# Patient Record
Sex: FEMALE | Race: WHITE | Employment: OTHER | URBAN - METROPOLITAN AREA
[De-identification: names, ages, dates, MRNs, and addresses within clinical notes are randomized per-mention and may not be internally consistent; named-entity substitution may affect disease eponyms.]

---

## 2017-01-18 RX ORDER — LEVOTHYROXINE SODIUM 175 UG/1
TABLET ORAL
Qty: 90 TABLET | Refills: 0 | Status: SHIPPED | OUTPATIENT
Start: 2017-01-18 | End: 2017-03-17 | Stop reason: SDUPTHER

## 2017-01-26 ENCOUNTER — OFFICE VISIT (OUTPATIENT)
Dept: INTERNAL MEDICINE CLINIC | Age: 72
End: 2017-01-26

## 2017-01-26 VITALS
BODY MASS INDEX: 46.15 KG/M2 | WEIGHT: 277 LBS | HEART RATE: 68 BPM | DIASTOLIC BLOOD PRESSURE: 90 MMHG | HEIGHT: 65 IN | SYSTOLIC BLOOD PRESSURE: 140 MMHG

## 2017-01-26 DIAGNOSIS — Z11.59 NEED FOR HEPATITIS C SCREENING TEST: ICD-10-CM

## 2017-01-26 DIAGNOSIS — E11.9 TYPE 2 DIABETES MELLITUS WITHOUT COMPLICATION, WITHOUT LONG-TERM CURRENT USE OF INSULIN (HCC): Primary | ICD-10-CM

## 2017-01-26 DIAGNOSIS — E03.9 ACQUIRED HYPOTHYROIDISM: ICD-10-CM

## 2017-01-26 DIAGNOSIS — E66.01 MORBID OBESITY DUE TO EXCESS CALORIES (HCC): ICD-10-CM

## 2017-01-26 DIAGNOSIS — I10 ESSENTIAL HYPERTENSION: ICD-10-CM

## 2017-01-26 DIAGNOSIS — E55.9 VITAMIN D DEFICIENCY: ICD-10-CM

## 2017-01-26 DIAGNOSIS — Z23 NEED FOR TDAP VACCINATION: ICD-10-CM

## 2017-01-26 DIAGNOSIS — E78.2 MIXED HYPERLIPIDEMIA: ICD-10-CM

## 2017-01-26 DIAGNOSIS — Z76.89 ENCOUNTER TO ESTABLISH CARE: ICD-10-CM

## 2017-01-26 PROCEDURE — 99215 OFFICE O/P EST HI 40 MIN: CPT | Performed by: FAMILY MEDICINE

## 2017-01-26 RX ORDER — LISINOPRIL AND HYDROCHLOROTHIAZIDE 20; 12.5 MG/1; MG/1
TABLET ORAL
Qty: 180 TABLET | Refills: 3 | Status: SHIPPED | OUTPATIENT
Start: 2017-01-26 | End: 2018-02-26 | Stop reason: SDUPTHER

## 2017-01-26 RX ORDER — SIMVASTATIN 20 MG
20 TABLET ORAL EVERY EVENING
Qty: 90 TABLET | Refills: 3 | Status: SHIPPED | OUTPATIENT
Start: 2017-01-26 | End: 2018-02-26 | Stop reason: SDUPTHER

## 2017-01-26 RX ORDER — GLIPIZIDE 5 MG/1
10 TABLET, FILM COATED, EXTENDED RELEASE ORAL 2 TIMES DAILY
Qty: 360 TABLET | Refills: 3 | Status: SHIPPED | OUTPATIENT
Start: 2017-01-26 | End: 2018-02-26 | Stop reason: SDUPTHER

## 2017-01-26 ASSESSMENT — PATIENT HEALTH QUESTIONNAIRE - PHQ9
1. LITTLE INTEREST OR PLEASURE IN DOING THINGS: 0
SUM OF ALL RESPONSES TO PHQ QUESTIONS 1-9: 0
2. FEELING DOWN, DEPRESSED OR HOPELESS: 0
SUM OF ALL RESPONSES TO PHQ9 QUESTIONS 1 & 2: 0

## 2017-01-29 ASSESSMENT — ENCOUNTER SYMPTOMS
VOMITING: 0
SORE THROAT: 0
TROUBLE SWALLOWING: 0
ABDOMINAL PAIN: 0
NAUSEA: 0
DIARRHEA: 0
BACK PAIN: 0
WHEEZING: 0
RHINORRHEA: 0
CHOKING: 0
COUGH: 0
SHORTNESS OF BREATH: 0
CONSTIPATION: 0

## 2017-02-07 ENCOUNTER — PATIENT MESSAGE (OUTPATIENT)
Dept: INTERNAL MEDICINE CLINIC | Age: 72
End: 2017-02-07

## 2017-02-13 ENCOUNTER — HOSPITAL ENCOUNTER (OUTPATIENT)
Dept: GENERAL RADIOLOGY | Age: 72
Discharge: HOME | End: 2017-02-13
Attending: FAMILY MEDICINE | Admitting: FAMILY MEDICINE

## 2017-02-13 ENCOUNTER — TELEPHONE (OUTPATIENT)
Dept: INTERNAL MEDICINE CLINIC | Age: 72
End: 2017-02-13

## 2017-02-13 DIAGNOSIS — E78.2 MIXED HYPERLIPIDEMIA: ICD-10-CM

## 2017-02-13 DIAGNOSIS — Z11.59 NEED FOR HEPATITIS C SCREENING TEST: ICD-10-CM

## 2017-02-13 DIAGNOSIS — E03.9 ACQUIRED HYPOTHYROIDISM: ICD-10-CM

## 2017-02-13 DIAGNOSIS — E11.9 TYPE 2 DIABETES MELLITUS WITHOUT COMPLICATION, WITHOUT LONG-TERM CURRENT USE OF INSULIN (HCC): ICD-10-CM

## 2017-02-13 DIAGNOSIS — F32.A DEPRESSION, UNSPECIFIED DEPRESSION TYPE: ICD-10-CM

## 2017-02-13 DIAGNOSIS — E55.9 VITAMIN D DEFICIENCY: ICD-10-CM

## 2017-02-13 LAB
A/G RATIO: 1.2 (ref 1.1–2.2)
ALBUMIN SERPL-MCNC: 3.8 G/DL (ref 3.4–5)
ALP BLD-CCNC: 96 U/L (ref 40–129)
ALT SERPL-CCNC: 18 U/L (ref 10–40)
ANION GAP SERPL CALCULATED.3IONS-SCNC: 15 MMOL/L (ref 3–16)
AST SERPL-CCNC: 19 U/L (ref 15–37)
BILIRUB SERPL-MCNC: 0.5 MG/DL (ref 0–1)
BUN BLDV-MCNC: 15 MG/DL (ref 7–20)
CALCIUM SERPL-MCNC: 9.4 MG/DL (ref 8.3–10.6)
CHLORIDE BLD-SCNC: 96 MMOL/L (ref 99–110)
CHOLESTEROL, TOTAL: 167 MG/DL (ref 0–199)
CO2: 28 MMOL/L (ref 21–32)
CREAT SERPL-MCNC: <0.5 MG/DL (ref 0.6–1.2)
CREATININE URINE: 123.8 MG/DL (ref 28–259)
GFR AFRICAN AMERICAN: >60
GFR NON-AFRICAN AMERICAN: >60
GLOBULIN: 3.2 G/DL
GLUCOSE BLD-MCNC: 252 MG/DL (ref 70–99)
HDLC SERPL-MCNC: 61 MG/DL (ref 40–60)
HEPATITIS C ANTIBODY INTERPRETATION: NORMAL
LDL CHOLESTEROL CALCULATED: 75 MG/DL
MICROALBUMIN UR-MCNC: 1.5 MG/DL
MICROALBUMIN/CREAT UR-RTO: 12.1 MG/G (ref 0–30)
POTASSIUM SERPL-SCNC: 4.6 MMOL/L (ref 3.5–5.1)
SODIUM BLD-SCNC: 139 MMOL/L (ref 136–145)
TOTAL PROTEIN: 7 G/DL (ref 6.4–8.2)
TRIGL SERPL-MCNC: 157 MG/DL (ref 0–150)
TSH SERPL DL<=0.05 MIU/L-ACNC: 2.65 UIU/ML (ref 0.27–4.2)
VITAMIN D 25-HYDROXY: 22.8 NG/ML
VLDLC SERPL CALC-MCNC: 31 MG/DL

## 2017-02-13 RX ORDER — FLUOXETINE HYDROCHLORIDE 40 MG/1
CAPSULE ORAL
Qty: 90 CAPSULE | Refills: 1 | Status: SHIPPED | OUTPATIENT
Start: 2017-02-13 | End: 2018-02-26 | Stop reason: SDUPTHER

## 2017-02-14 LAB
ESTIMATED AVERAGE GLUCOSE: 231.7 MG/DL
HBA1C MFR BLD: 9.7 %

## 2017-02-17 ENCOUNTER — TELEPHONE (OUTPATIENT)
Dept: INTERNAL MEDICINE CLINIC | Age: 72
End: 2017-02-17

## 2017-02-27 ENCOUNTER — OFFICE VISIT (OUTPATIENT)
Dept: INTERNAL MEDICINE CLINIC | Age: 72
End: 2017-02-27

## 2017-02-27 ENCOUNTER — OFFICE VISIT (OUTPATIENT)
Dept: ORTHOPEDIC SURGERY | Age: 72
End: 2017-02-27

## 2017-02-27 VITALS
DIASTOLIC BLOOD PRESSURE: 79 MMHG | HEIGHT: 65 IN | HEART RATE: 64 BPM | BODY MASS INDEX: 45.32 KG/M2 | SYSTOLIC BLOOD PRESSURE: 158 MMHG | WEIGHT: 272 LBS

## 2017-02-27 VITALS
TEMPERATURE: 97.9 F | WEIGHT: 272 LBS | DIASTOLIC BLOOD PRESSURE: 64 MMHG | SYSTOLIC BLOOD PRESSURE: 128 MMHG | BODY MASS INDEX: 45.26 KG/M2

## 2017-02-27 DIAGNOSIS — E66.01 MORBID (SEVERE) OBESITY DUE TO EXCESS CALORIES (HCC): ICD-10-CM

## 2017-02-27 DIAGNOSIS — M17.11 PRIMARY OSTEOARTHRITIS OF RIGHT KNEE: Primary | ICD-10-CM

## 2017-02-27 DIAGNOSIS — E11.9 TYPE 2 DIABETES MELLITUS WITHOUT COMPLICATION, WITHOUT LONG-TERM CURRENT USE OF INSULIN (HCC): Primary | ICD-10-CM

## 2017-02-27 DIAGNOSIS — M25.561 RIGHT KNEE PAIN, UNSPECIFIED CHRONICITY: ICD-10-CM

## 2017-02-27 PROCEDURE — 99203 OFFICE O/P NEW LOW 30 MIN: CPT | Performed by: ORTHOPAEDIC SURGERY

## 2017-02-27 PROCEDURE — 73564 X-RAY EXAM KNEE 4 OR MORE: CPT | Performed by: ORTHOPAEDIC SURGERY

## 2017-02-27 PROCEDURE — 99214 OFFICE O/P EST MOD 30 MIN: CPT | Performed by: NURSE PRACTITIONER

## 2017-02-27 ASSESSMENT — ENCOUNTER SYMPTOMS
DIARRHEA: 0
VOMITING: 0
SORE THROAT: 0
SINUS PRESSURE: 0
COUGH: 0
NAUSEA: 0
FACIAL SWELLING: 0

## 2017-02-28 ENCOUNTER — TELEPHONE (OUTPATIENT)
Dept: INTERNAL MEDICINE CLINIC | Age: 72
End: 2017-02-28

## 2017-03-02 ENCOUNTER — TELEPHONE (OUTPATIENT)
Dept: ORTHOPEDIC SURGERY | Age: 72
End: 2017-03-02

## 2017-03-09 ENCOUNTER — OFFICE VISIT (OUTPATIENT)
Dept: ORTHOPEDIC SURGERY | Age: 72
End: 2017-03-09

## 2017-03-09 DIAGNOSIS — M25.561 RIGHT KNEE PAIN, UNSPECIFIED CHRONICITY: ICD-10-CM

## 2017-03-09 DIAGNOSIS — M17.11 PRIMARY OSTEOARTHRITIS OF RIGHT KNEE: Primary | ICD-10-CM

## 2017-03-09 PROCEDURE — 20611 DRAIN/INJ JOINT/BURSA W/US: CPT | Performed by: ORTHOPAEDIC SURGERY

## 2017-03-16 ENCOUNTER — NURSE ONLY (OUTPATIENT)
Dept: ORTHOPEDIC SURGERY | Age: 72
End: 2017-03-16

## 2017-03-16 DIAGNOSIS — M17.11 PRIMARY OSTEOARTHRITIS OF RIGHT KNEE: Primary | ICD-10-CM

## 2017-03-16 PROCEDURE — 20611 DRAIN/INJ JOINT/BURSA W/US: CPT | Performed by: PHYSICIAN ASSISTANT

## 2017-03-17 DIAGNOSIS — I10 ESSENTIAL HYPERTENSION: ICD-10-CM

## 2017-03-20 RX ORDER — AMLODIPINE BESYLATE 10 MG/1
TABLET ORAL
Qty: 90 TABLET | Refills: 3 | Status: SHIPPED | OUTPATIENT
Start: 2017-03-20 | End: 2018-05-07 | Stop reason: SDUPTHER

## 2017-03-20 RX ORDER — LEVOTHYROXINE SODIUM 175 UG/1
TABLET ORAL
Qty: 90 TABLET | Refills: 3 | Status: SHIPPED | OUTPATIENT
Start: 2017-03-20 | End: 2018-05-07 | Stop reason: SDUPTHER

## 2017-03-24 ENCOUNTER — NURSE ONLY (OUTPATIENT)
Dept: ORTHOPEDIC SURGERY | Age: 72
End: 2017-03-24

## 2017-03-24 DIAGNOSIS — M17.11 PRIMARY OSTEOARTHRITIS OF RIGHT KNEE: Primary | ICD-10-CM

## 2017-03-24 PROCEDURE — 20611 DRAIN/INJ JOINT/BURSA W/US: CPT | Performed by: PHYSICIAN ASSISTANT

## 2017-04-24 ENCOUNTER — OFFICE VISIT (OUTPATIENT)
Dept: INTERNAL MEDICINE CLINIC | Age: 72
End: 2017-04-24

## 2017-04-24 ENCOUNTER — HOSPITAL ENCOUNTER (OUTPATIENT)
Dept: GENERAL RADIOLOGY | Age: 72
Discharge: HOME | End: 2017-04-24
Attending: NURSE PRACTITIONER | Admitting: NURSE PRACTITIONER

## 2017-04-24 VITALS
SYSTOLIC BLOOD PRESSURE: 136 MMHG | TEMPERATURE: 98.4 F | WEIGHT: 263 LBS | DIASTOLIC BLOOD PRESSURE: 64 MMHG | BODY MASS INDEX: 43.77 KG/M2

## 2017-04-24 DIAGNOSIS — Z79.4 TYPE 2 DIABETES MELLITUS WITHOUT COMPLICATION, WITH LONG-TERM CURRENT USE OF INSULIN (HCC): Primary | ICD-10-CM

## 2017-04-24 DIAGNOSIS — E11.9 TYPE 2 DIABETES MELLITUS WITHOUT COMPLICATION, WITH LONG-TERM CURRENT USE OF INSULIN (HCC): Primary | ICD-10-CM

## 2017-04-24 DIAGNOSIS — E11.9 TYPE 2 DIABETES MELLITUS WITHOUT COMPLICATION, WITH LONG-TERM CURRENT USE OF INSULIN (HCC): ICD-10-CM

## 2017-04-24 DIAGNOSIS — E66.01 MORBID (SEVERE) OBESITY DUE TO EXCESS CALORIES (HCC): ICD-10-CM

## 2017-04-24 DIAGNOSIS — Z79.4 TYPE 2 DIABETES MELLITUS WITHOUT COMPLICATION, WITH LONG-TERM CURRENT USE OF INSULIN (HCC): ICD-10-CM

## 2017-04-24 LAB
ESTIMATED AVERAGE GLUCOSE: 159.9 MG/DL
HBA1C MFR BLD: 7.2 %

## 2017-04-24 PROCEDURE — 99213 OFFICE O/P EST LOW 20 MIN: CPT | Performed by: NURSE PRACTITIONER

## 2017-04-24 ASSESSMENT — ENCOUNTER SYMPTOMS
VOMITING: 0
DIARRHEA: 0
NAUSEA: 0
SORE THROAT: 0
COUGH: 0
FACIAL SWELLING: 0
SINUS PRESSURE: 0

## 2017-05-01 ENCOUNTER — HOSPITAL ENCOUNTER (OUTPATIENT)
Dept: MAMMOGRAPHY | Age: 72
Discharge: HOME | End: 2017-05-01
Attending: FAMILY MEDICINE | Admitting: FAMILY MEDICINE

## 2017-05-01 DIAGNOSIS — Z12.31 ENCOUNTER FOR SCREENING MAMMOGRAM FOR MALIGNANT NEOPLASM OF BREAST: ICD-10-CM

## 2017-05-03 DIAGNOSIS — E11.9 TYPE 2 DIABETES MELLITUS WITHOUT COMPLICATION (HCC): ICD-10-CM

## 2017-05-03 RX ORDER — BLOOD SUGAR DIAGNOSTIC
STRIP MISCELLANEOUS
Qty: 100 STRIP | Refills: 2 | Status: SHIPPED | OUTPATIENT
Start: 2017-05-03 | End: 2018-03-16 | Stop reason: SDUPTHER

## 2017-05-31 ENCOUNTER — PATIENT MESSAGE (OUTPATIENT)
Dept: INTERNAL MEDICINE CLINIC | Age: 72
End: 2017-05-31

## 2017-05-31 DIAGNOSIS — R50.9 FEVER, UNSPECIFIED FEVER CAUSE: ICD-10-CM

## 2017-05-31 RX ORDER — AMOXICILLIN AND CLAVULANATE POTASSIUM 875; 125 MG/1; MG/1
1 TABLET, FILM COATED ORAL 2 TIMES DAILY
Qty: 20 TABLET | Refills: 0 | Status: SHIPPED | OUTPATIENT
Start: 2017-05-31 | End: 2017-06-09

## 2017-06-09 ENCOUNTER — OFFICE VISIT (OUTPATIENT)
Dept: INTERNAL MEDICINE CLINIC | Age: 72
End: 2017-06-09

## 2017-06-09 VITALS
SYSTOLIC BLOOD PRESSURE: 136 MMHG | OXYGEN SATURATION: 98 % | HEIGHT: 66 IN | BODY MASS INDEX: 42.11 KG/M2 | WEIGHT: 262 LBS | HEART RATE: 72 BPM | DIASTOLIC BLOOD PRESSURE: 74 MMHG

## 2017-06-09 DIAGNOSIS — E03.9 ACQUIRED HYPOTHYROIDISM: ICD-10-CM

## 2017-06-09 DIAGNOSIS — E78.2 MIXED HYPERLIPIDEMIA: ICD-10-CM

## 2017-06-09 DIAGNOSIS — H26.9 CATARACT: Primary | ICD-10-CM

## 2017-06-09 DIAGNOSIS — E66.01 MORBID OBESITY DUE TO EXCESS CALORIES (HCC): ICD-10-CM

## 2017-06-09 DIAGNOSIS — E11.9 TYPE 2 DIABETES MELLITUS WITHOUT COMPLICATION, WITH LONG-TERM CURRENT USE OF INSULIN (HCC): ICD-10-CM

## 2017-06-09 DIAGNOSIS — E55.9 VITAMIN D DEFICIENCY: ICD-10-CM

## 2017-06-09 DIAGNOSIS — Z79.4 TYPE 2 DIABETES MELLITUS WITHOUT COMPLICATION, WITH LONG-TERM CURRENT USE OF INSULIN (HCC): ICD-10-CM

## 2017-06-09 DIAGNOSIS — Z01.818 PRE-OP EXAMINATION: ICD-10-CM

## 2017-06-09 DIAGNOSIS — I10 ESSENTIAL HYPERTENSION: ICD-10-CM

## 2017-06-09 PROCEDURE — 99214 OFFICE O/P EST MOD 30 MIN: CPT | Performed by: NURSE PRACTITIONER

## 2017-06-09 RX ORDER — MOXIFLOXACIN HCL 0.5 %
DROPS OPHTHALMIC (EYE)
COMMUNITY
Start: 2017-06-07 | End: 2017-11-22 | Stop reason: ALTCHOICE

## 2017-06-09 RX ORDER — PREDNISOLONE ACETATE 10 MG/ML
SUSPENSION/ DROPS OPHTHALMIC
COMMUNITY
Start: 2017-06-07 | End: 2017-11-22 | Stop reason: ALTCHOICE

## 2017-07-24 ENCOUNTER — HOSPITAL ENCOUNTER (OUTPATIENT)
Dept: GENERAL RADIOLOGY | Age: 72
Discharge: HOME | End: 2017-07-24
Attending: FAMILY MEDICINE | Admitting: FAMILY MEDICINE

## 2017-07-24 ENCOUNTER — OFFICE VISIT (OUTPATIENT)
Dept: INTERNAL MEDICINE CLINIC | Age: 72
End: 2017-07-24

## 2017-07-24 VITALS
BODY MASS INDEX: 42.94 KG/M2 | DIASTOLIC BLOOD PRESSURE: 62 MMHG | OXYGEN SATURATION: 96 % | HEART RATE: 66 BPM | WEIGHT: 262 LBS | SYSTOLIC BLOOD PRESSURE: 104 MMHG

## 2017-07-24 DIAGNOSIS — Z79.4 TYPE 2 DIABETES MELLITUS WITHOUT COMPLICATION, WITH LONG-TERM CURRENT USE OF INSULIN (HCC): ICD-10-CM

## 2017-07-24 DIAGNOSIS — N39.490 OVERFLOW INCONTINENCE: ICD-10-CM

## 2017-07-24 DIAGNOSIS — I10 ESSENTIAL HYPERTENSION: ICD-10-CM

## 2017-07-24 DIAGNOSIS — Z79.4 TYPE 2 DIABETES MELLITUS WITHOUT COMPLICATION, WITH LONG-TERM CURRENT USE OF INSULIN (HCC): Primary | ICD-10-CM

## 2017-07-24 DIAGNOSIS — E11.9 TYPE 2 DIABETES MELLITUS WITHOUT COMPLICATION, WITH LONG-TERM CURRENT USE OF INSULIN (HCC): ICD-10-CM

## 2017-07-24 DIAGNOSIS — E78.2 MIXED HYPERLIPIDEMIA: ICD-10-CM

## 2017-07-24 DIAGNOSIS — E03.9 ACQUIRED HYPOTHYROIDISM: ICD-10-CM

## 2017-07-24 DIAGNOSIS — E55.9 VITAMIN D DEFICIENCY: ICD-10-CM

## 2017-07-24 DIAGNOSIS — E11.9 TYPE 2 DIABETES MELLITUS WITHOUT COMPLICATION, WITH LONG-TERM CURRENT USE OF INSULIN (HCC): Primary | ICD-10-CM

## 2017-07-24 DIAGNOSIS — E66.01 MORBID OBESITY DUE TO EXCESS CALORIES (HCC): ICD-10-CM

## 2017-07-24 LAB
ANION GAP SERPL CALCULATED.3IONS-SCNC: 15 MMOL/L (ref 3–16)
BUN BLDV-MCNC: 12 MG/DL (ref 7–20)
CALCIUM SERPL-MCNC: 9.9 MG/DL (ref 8.3–10.6)
CHLORIDE BLD-SCNC: 96 MMOL/L (ref 99–110)
CO2: 28 MMOL/L (ref 21–32)
CREAT SERPL-MCNC: 0.5 MG/DL (ref 0.6–1.2)
GFR AFRICAN AMERICAN: >60
GFR NON-AFRICAN AMERICAN: >60
GLUCOSE BLD-MCNC: 111 MG/DL (ref 70–99)
POTASSIUM SERPL-SCNC: 4.7 MMOL/L (ref 3.5–5.1)
SODIUM BLD-SCNC: 139 MMOL/L (ref 136–145)
VITAMIN D 25-HYDROXY: 30.3 NG/ML

## 2017-07-24 PROCEDURE — 99214 OFFICE O/P EST MOD 30 MIN: CPT | Performed by: FAMILY MEDICINE

## 2017-07-24 ASSESSMENT — ENCOUNTER SYMPTOMS
BACK PAIN: 0
RHINORRHEA: 0
CHOKING: 0
VOMITING: 0
DIARRHEA: 0
SHORTNESS OF BREATH: 0
WHEEZING: 0
COUGH: 0
NAUSEA: 0
TROUBLE SWALLOWING: 0
CONSTIPATION: 0
ABDOMINAL PAIN: 0
SORE THROAT: 0

## 2017-07-25 LAB
ESTIMATED AVERAGE GLUCOSE: 154.2 MG/DL
HBA1C MFR BLD: 7 %

## 2017-10-26 LAB
CONTROL: NORMAL
HEMOCCULT STL QL: NEGATIVE

## 2017-11-02 RX ORDER — LIRAGLUTIDE 6 MG/ML
INJECTION SUBCUTANEOUS
Qty: 6 PEN | Refills: 2 | Status: SHIPPED | OUTPATIENT
Start: 2017-11-02 | End: 2017-11-22 | Stop reason: SDUPTHER

## 2017-11-02 NOTE — TELEPHONE ENCOUNTER
Refill request for  Victoza  medication.      Name of Mounika Shopnation     Last visit - 07/24/17     Pending visit - 11/22/17    Last refill -02/28/17

## 2017-11-10 ENCOUNTER — OFFICE VISIT (OUTPATIENT)
Dept: INTERNAL MEDICINE CLINIC | Age: 72
End: 2017-11-10

## 2017-11-10 VITALS
SYSTOLIC BLOOD PRESSURE: 136 MMHG | BODY MASS INDEX: 43.26 KG/M2 | DIASTOLIC BLOOD PRESSURE: 74 MMHG | TEMPERATURE: 98.4 F | WEIGHT: 264 LBS

## 2017-11-10 DIAGNOSIS — F33.0 MILD EPISODE OF RECURRENT MAJOR DEPRESSIVE DISORDER (HCC): ICD-10-CM

## 2017-11-10 DIAGNOSIS — Z12.11 SCREEN FOR COLON CANCER: ICD-10-CM

## 2017-11-10 DIAGNOSIS — H92.01 RIGHT EAR PAIN: Primary | ICD-10-CM

## 2017-11-10 DIAGNOSIS — F51.01 PRIMARY INSOMNIA: ICD-10-CM

## 2017-11-10 PROCEDURE — 99213 OFFICE O/P EST LOW 20 MIN: CPT | Performed by: NURSE PRACTITIONER

## 2017-11-10 PROCEDURE — 82274 ASSAY TEST FOR BLOOD FECAL: CPT | Performed by: NURSE PRACTITIONER

## 2017-11-10 RX ORDER — BUPROPION HYDROCHLORIDE 150 MG/1
150 TABLET ORAL EVERY MORNING
Qty: 30 TABLET | Refills: 1 | Status: SHIPPED | OUTPATIENT
Start: 2017-11-10 | End: 2018-11-29

## 2017-11-10 ASSESSMENT — ENCOUNTER SYMPTOMS
SINUS PRESSURE: 1
SORE THROAT: 0
FACIAL SWELLING: 0
VOMITING: 0
DIARRHEA: 0
NAUSEA: 0
COUGH: 0

## 2017-11-10 NOTE — PROGRESS NOTES
Subjective:      Patient ID: Sky Monroy is a 71 y.o. female. HPI   Chief Complaint   Patient presents with    Tinnitus     bilat , pressure R>L       Pt c/o ringing in ears chronically but 5 days ago she felt a large pressure \"roar\" in Rt ear. She then felt right headed pressure. C/o nausea, stomach upset. The \"roaring\" will occur intermittently. Sinus congestion. No throat pain, denies fever. Tearful. She feels she is in a 'rut' and is not getting out of it. She is not exercising but still meeting up with friends for cards 1-2 times a week. She is not exercising and eating choices are worsening. C/o fatigue, lack of motivation, sleeping but doesn't feel she is sleeping well. Prior to Visit Medications    Medication Sig Taking?  Authorizing Provider   buPROPion (WELLBUTRIN XL) 150 MG extended release tablet Take 1 tablet by mouth every morning Yes Cong Shepherd CNP   VICTOZA 18 MG/3ML SOPN SC injection DIAL AND INJECT SUBCUTANEOUSLY 0.6MG DAILY  Karissa Mcneill MD   VIGAMOX 0.5 % ophthalmic solution   Historical Provider, MD   prednisoLONE acetate (PRED FORTE) 1 % ophthalmic suspension   Historical Provider, MD Yolis Palacios MD   amLODIPine (NORVASC) 10 MG tablet TAKE ONE TABLET BY MOUTH DAILY  Karissa Mcneill MD   levothyroxine (SYNTHROID) 175 MCG tablet TAKE ONE TABLET BY MOUTH DAILY  Karissa Mcneill MD   Insulin Pen Needle 32G X 4 MM MISC 1 each by Does not apply route daily  Cong Shepherd CNP   FLUoxetine (PROZAC) 40 MG capsule TAKE ONE CAPSULE BY MOUTH DAILY  Karissa Mcneill MD   lisinopril-hydrochlorothiazide (PRINZIDE;ZESTORETIC) 20-12.5 MG per tablet TAKE TWO TABLETS BY MOUTH DAILY  Karissa Mcneill MD   glipiZIDE (GLIPIZIDE XL) 5 MG extended release tablet Take 2 tablets by mouth 2 times daily  Karissa Mcneill MD   metFORMIN (GLUCOPHAGE) 1000 MG tablet [place] TWICE A DAY WITH MEALS  Karissa Mcneill MD simvastatin (ZOCOR) 20 MG tablet Take 1 tablet by mouth every evening  Jefe Singh MD   Blood Glucose Monitoring Suppl (ACURA BLOOD GLUCOSE METER) W/DEVICE KIT Provide pt with glucometer under forumlary - ACCU-CHEK, Dx Type 2 DM, testing daily  Cong E Joao, CNP   Lancets MISC 1 po daily, please provide insurance formulary brand - ACCU-CHEK, Dx Type 2 DM, testing daily  Cong E Joao, CNP   Vitamin D (CHOLECALCIFEROL) 1000 UNITS CAPS capsule Take 1,000 Units by mouth daily. Historical Provider, MD   calcium-vitamin D (OSCAL-500) 500-200 MG-UNIT per tablet Take 2 tablets by mouth daily. Historical Provider, MD   aspirin 81 MG tablet Take 81 mg by mouth daily. Historical Provider, MD     Social History     Social History    Marital status:      Spouse name: N/A    Number of children: N/A    Years of education: N/A     Occupational History    Not on file. Social History Main Topics    Smoking status: Former Smoker     Packs/day: 1.00     Years: 15.00     Types: Cigarettes     Quit date: 1/1/1978    Smokeless tobacco: Never Used    Alcohol use No    Drug use: No    Sexual activity: Not Currently     Other Topics Concern    Not on file     Social History Narrative    No narrative on file     No family history on file. Review of Systems   Constitutional: Positive for fatigue. Negative for appetite change, chills, fever and unexpected weight change. HENT: Positive for congestion, ear pain (right) and sinus pressure. Negative for ear discharge, facial swelling, hearing loss, sneezing and sore throat. Respiratory: Negative for cough. Cardiovascular: Negative for chest pain. Gastrointestinal: Negative for diarrhea, nausea and vomiting. Genitourinary: Negative for difficulty urinating, dysuria, hematuria and urgency. Musculoskeletal: Negative for arthralgias and gait problem. Neurological: Positive for dizziness. Negative for weakness and headaches.    Hematological: Negative for adenopathy. Psychiatric/Behavioral: Positive for dysphoric mood. Negative for sleep disturbance and suicidal ideas. Objective:   Physical Exam   Constitutional: She is oriented to person, place, and time. She appears well-developed and well-nourished. No distress. Pleasant, morbidly obese   HENT:   Head: Normocephalic and atraumatic. Right Ear: External ear normal.   Left Ear: External ear normal.   Nose: Nose normal.   Mouth/Throat: Oropharynx is clear and moist.   Eyes: Conjunctivae and EOM are normal. Pupils are equal, round, and reactive to light. Neck: Normal range of motion. Neck supple. Cardiovascular: Normal rate, regular rhythm and normal heart sounds. Pulmonary/Chest: Effort normal and breath sounds normal. She has no wheezes. She has no rales. Lymphadenopathy:     She has no cervical adenopathy. Neurological: She is alert and oriented to person, place, and time. No cranial nerve deficit. Skin: She is not diaphoretic. Psychiatric:   Tearful       Assessment:      1. Right ear pain  Start Flonase nasal spray OTC  If persistent pain, call office    2. Primary insomnia  Enc good sleep hygiene. 3. Mild episode of recurrent major depressive disorder ([facility] [address]  Spent good time with patient reviewing mood and encouraged to continue good routine in AMs. Increase exercise 1-2 times a week to help with mood. Start Wellbutrin daily and f/u for DM and will review new medication    - buPROPion (WELLBUTRIN XL) 150 MG extended release tablet; Take 1 tablet by mouth every morning  Dispense: 30 tablet; Refill: 1          Plan:      See above plan    Return if symptoms worsen or fail to improve.

## 2017-11-22 ENCOUNTER — OFFICE VISIT (OUTPATIENT)
Dept: INTERNAL MEDICINE CLINIC | Age: 72
End: 2017-11-22

## 2017-11-22 VITALS
SYSTOLIC BLOOD PRESSURE: 138 MMHG | BODY MASS INDEX: 43.76 KG/M2 | WEIGHT: 267 LBS | HEART RATE: 92 BPM | DIASTOLIC BLOOD PRESSURE: 82 MMHG | OXYGEN SATURATION: 97 %

## 2017-11-22 DIAGNOSIS — Z79.4 TYPE 2 DIABETES MELLITUS WITHOUT COMPLICATION, WITH LONG-TERM CURRENT USE OF INSULIN (HCC): Primary | ICD-10-CM

## 2017-11-22 DIAGNOSIS — E11.9 TYPE 2 DIABETES MELLITUS WITHOUT COMPLICATION, WITH LONG-TERM CURRENT USE OF INSULIN (HCC): Primary | ICD-10-CM

## 2017-11-22 DIAGNOSIS — E66.01 MORBID OBESITY DUE TO EXCESS CALORIES (HCC): ICD-10-CM

## 2017-11-22 LAB — HBA1C MFR BLD: 7.2 %

## 2017-11-22 PROCEDURE — 83036 HEMOGLOBIN GLYCOSYLATED A1C: CPT | Performed by: NURSE PRACTITIONER

## 2017-11-22 PROCEDURE — 99213 OFFICE O/P EST LOW 20 MIN: CPT | Performed by: NURSE PRACTITIONER

## 2017-11-22 ASSESSMENT — ENCOUNTER SYMPTOMS
COUGH: 0
SINUS PRESSURE: 0
VOMITING: 0
SORE THROAT: 0
FACIAL SWELLING: 0
NAUSEA: 0
DIARRHEA: 0

## 2017-11-22 NOTE — PROGRESS NOTES
Subjective:      Patient ID: Natalya Bermudez is a 71 y.o. female. HPI   Chief Complaint   Patient presents with    Diabetes       Has really tried hard to get DM under better control but has been eating a few more Lean Cuisine instead of lower carb options. Tolerating addition of low dose Victoza well. Eating better in general as well. Has not been exercising like she was or keeping food journal like she was in the past year. Weight is down 15 lbs in the last 6 months but has gained a couple pounds since last OV with Dr. Duncan Ramirez. Checking BS fasting daily, 100-130s. A1c 7.2 (from 7.0). Prior to Visit Medications    Medication Sig Taking?  Authorizing Provider   Liraglutide (VICTOZA) 18 MG/3ML SOPN SC injection DIAL AND INJECT SUBCUTANEOUSLY 1.2 MG DAILY Yes Cong Shepherd CNP   buPROPion (WELLBUTRIN XL) 150 MG extended release tablet Take 1 tablet by mouth every morning Yes Sara Garcia CNP   ACCU-CHEK LIBBY PLUS strip USE DAILY WITH METER Yes Connie Escobar MD   amLODIPine (NORVASC) 10 MG tablet TAKE ONE TABLET BY MOUTH DAILY Yes Connie Escobar MD   levothyroxine (SYNTHROID) 175 MCG tablet TAKE ONE TABLET BY MOUTH DAILY Yes Connie Escobar MD   Insulin Pen Needle 32G X 4 MM MISC 1 each by Does not apply route daily Yes Cong Shepherd CNP   FLUoxetine (PROZAC) 40 MG capsule TAKE ONE CAPSULE BY MOUTH DAILY Yes Connie Escobar MD   lisinopril-hydrochlorothiazide (PRINZIDE;ZESTORETIC) 20-12.5 MG per tablet TAKE TWO TABLETS BY MOUTH DAILY Yes Connie Escobar MD   glipiZIDE (GLIPIZIDE XL) 5 MG extended release tablet Take 2 tablets by mouth 2 times daily Yes Connie Escobar MD   metFORMIN (GLUCOPHAGE) 1000 MG tablet TAKE ONE TABLET BY MOUTH TWICE A DAY WITH MEALS Yes Connie Escobar MD   simvastatin (ZOCOR) 20 MG tablet Take 1 tablet by mouth every evening Yes Connie Escobar MD   Blood Glucose Monitoring Suppl (ACURA BLOOD GLUCOSE METER) W/DEVICE KIT Provide pt with glucometer under forumla - Morbidly obese   HENT:   Head: Normocephalic and atraumatic. Right Ear: External ear normal.   Left Ear: External ear normal.   Mouth/Throat: Oropharynx is clear and moist.   Cardiovascular: Normal rate, regular rhythm, normal heart sounds and intact distal pulses. Pulmonary/Chest: Breath sounds normal. She has no wheezes. She has no rales. Musculoskeletal: She exhibits no edema. Neurological: She is alert and oriented to person, place, and time. No cranial nerve deficit. Skin: She is not diaphoretic. Psychiatric: She has a normal mood and affect. Her behavior is normal. Thought content normal.       Assessment:      1. Type 2 diabetes mellitus without complication, with long-term current use of insulin ([facility])  a1c stable 7.2  Will Increase Victoza from 0.6mg daily to 1.2mg daily. She will monitor BS closely and consider decrease Glipizide by 1 pill daily if BS < 100. Call if any difficulty tolerating Victoza  Restart food journal and lower carb dinner options. - POCT glycosylated hemoglobin (Hb A1C)    2. Morbid obesity due to excess calories [facility])  See above note          Plan:      See above plan    Return in about 3 months (around 2/22/2018) for Pilar 3 months, Cong 6 months 30 min appt, DM.

## 2017-11-30 DIAGNOSIS — F32.A DEPRESSION, UNSPECIFIED DEPRESSION TYPE: ICD-10-CM

## 2017-11-30 RX ORDER — FLUOXETINE HYDROCHLORIDE 40 MG/1
CAPSULE ORAL
Qty: 90 CAPSULE | Refills: 1 | Status: SHIPPED | OUTPATIENT
Start: 2017-11-30 | End: 2018-02-13 | Stop reason: SDUPTHER

## 2017-11-30 NOTE — TELEPHONE ENCOUNTER
Refill request for prozac medication.      Name of Pharmacy- [facility]    Last visit - 7-24-17     Pending visit - 1-29-18    Last refill -2-13-17    Medication Contract signed -   Herve kathleen-     Additional Comments

## 2018-02-13 ENCOUNTER — OFFICE VISIT (OUTPATIENT)
Dept: INTERNAL MEDICINE CLINIC | Age: 73
End: 2018-02-13

## 2018-02-13 VITALS
TEMPERATURE: 96.8 F | HEIGHT: 66 IN | WEIGHT: 270 LBS | OXYGEN SATURATION: 96 % | DIASTOLIC BLOOD PRESSURE: 68 MMHG | BODY MASS INDEX: 43.39 KG/M2 | SYSTOLIC BLOOD PRESSURE: 126 MMHG | HEART RATE: 68 BPM

## 2018-02-13 DIAGNOSIS — J06.9 VIRAL URI WITH COUGH: Primary | ICD-10-CM

## 2018-02-13 PROCEDURE — 99213 OFFICE O/P EST LOW 20 MIN: CPT | Performed by: FAMILY MEDICINE

## 2018-02-13 RX ORDER — BENZONATATE 100 MG/1
100-200 CAPSULE ORAL 3 TIMES DAILY PRN
Qty: 30 CAPSULE | Refills: 0 | Status: SHIPPED | OUTPATIENT
Start: 2018-02-13 | End: 2019-07-10

## 2018-02-13 ASSESSMENT — ENCOUNTER SYMPTOMS
NAUSEA: 0
RHINORRHEA: 1
COUGH: 1
CHEST TIGHTNESS: 0
SHORTNESS OF BREATH: 0
SORE THROAT: 0
VOMITING: 0
SINUS PRESSURE: 0
DIARRHEA: 0
EYE DISCHARGE: 0

## 2018-02-13 ASSESSMENT — PATIENT HEALTH QUESTIONNAIRE - PHQ9
2. FEELING DOWN, DEPRESSED OR HOPELESS: 0
SUM OF ALL RESPONSES TO PHQ QUESTIONS 1-9: 0
SUM OF ALL RESPONSES TO PHQ9 QUESTIONS 1 & 2: 0
1. LITTLE INTEREST OR PLEASURE IN DOING THINGS: 0

## 2018-02-13 NOTE — PROGRESS NOTES
Deanne Cavazos          Chief Complaint   Patient presents with    Shortness of Breath    Cough       HPI:     Got sick with URI 2 weeks ago, then improved. Took mucinex and OTC cough suppressants during that time. Started coughing 5 days ago again, now feels congestion in her right bronchi. COugh is minimally productive. Started back on Mucinex for a few days, and taking Tylenol Cold and flu  Cough is tiring her out, chest is sore, but no fevers  No N/V/D    BS have been ok, in spite of recent illnesses. FBS range . Quit drinking wine 12 days ago which has been helping.       Current Outpatient Prescriptions on File Prior to Visit   Medication Sig Dispense Refill    Liraglutide (VICTOZA) 18 MG/3ML SOPN SC injection DIAL AND INJECT SUBCUTANEOUSLY 1.2 MG DAILY 6 Pen 2    buPROPion (WELLBUTRIN XL) 150 MG extended release tablet Take 1 tablet by mouth every morning 30 tablet 1    ACCU-CHEK LIBBY PLUS strip USE DAILY WITH METER 100 strip 2    amLODIPine (NORVASC) 10 MG tablet TAKE ONE TABLET BY MOUTH DAILY 90 tablet 3    levothyroxine (SYNTHROID) 175 MCG tablet TAKE ONE TABLET BY MOUTH DAILY 90 tablet 3    Insulin Pen Needle 32G X 4 MM MISC 1 each by Does not apply route daily 100 each 3    FLUoxetine (PROZAC) 40 MG capsule TAKE ONE CAPSULE BY MOUTH DAILY 90 capsule 1    lisinopril-hydrochlorothiazide (PRINZIDE;ZESTORETIC) 20-12.5 MG per tablet TAKE TWO TABLETS BY MOUTH DAILY 180 tablet 3    glipiZIDE (GLIPIZIDE XL) 5 MG extended release tablet Take 2 tablets by mouth 2 times daily 360 tablet 3    metFORMIN (GLUCOPHAGE) 1000 MG tablet TAKE ONE TABLET BY MOUTH TWICE A DAY WITH MEALS 180 tablet 3    simvastatin (ZOCOR) 20 MG tablet Take 1 tablet by mouth every evening 90 tablet 3    Blood Glucose Monitoring Suppl (ACURA BLOOD GLUCOSE METER) W/DEVICE KIT Provide pt with glucometer under forumlary - ACCU-CHEK, Dx Type 2 DM, testing daily 1 kit 0    Lancets MISC 1 po daily, please provide

## 2018-02-26 ENCOUNTER — HOSPITAL ENCOUNTER (OUTPATIENT)
Dept: GENERAL RADIOLOGY | Age: 73
Discharge: HOME | End: 2018-02-26
Attending: FAMILY MEDICINE | Admitting: FAMILY MEDICINE

## 2018-02-26 ENCOUNTER — OFFICE VISIT (OUTPATIENT)
Dept: INTERNAL MEDICINE CLINIC | Age: 73
End: 2018-02-26

## 2018-02-26 VITALS
HEART RATE: 77 BPM | OXYGEN SATURATION: 97 % | SYSTOLIC BLOOD PRESSURE: 124 MMHG | BODY MASS INDEX: 43.43 KG/M2 | DIASTOLIC BLOOD PRESSURE: 60 MMHG | WEIGHT: 265 LBS

## 2018-02-26 DIAGNOSIS — Z23 NEED FOR SHINGLES VACCINE: ICD-10-CM

## 2018-02-26 DIAGNOSIS — E03.9 ACQUIRED HYPOTHYROIDISM: ICD-10-CM

## 2018-02-26 DIAGNOSIS — I10 ESSENTIAL HYPERTENSION: ICD-10-CM

## 2018-02-26 DIAGNOSIS — E78.2 MIXED HYPERLIPIDEMIA: ICD-10-CM

## 2018-02-26 DIAGNOSIS — F32.A DEPRESSION, UNSPECIFIED DEPRESSION TYPE: ICD-10-CM

## 2018-02-26 DIAGNOSIS — E66.01 MORBID OBESITY DUE TO EXCESS CALORIES (HCC): ICD-10-CM

## 2018-02-26 DIAGNOSIS — E55.9 VITAMIN D DEFICIENCY: ICD-10-CM

## 2018-02-26 DIAGNOSIS — Z79.4 TYPE 2 DIABETES MELLITUS WITHOUT COMPLICATION, WITH LONG-TERM CURRENT USE OF INSULIN (HCC): ICD-10-CM

## 2018-02-26 DIAGNOSIS — E11.9 TYPE 2 DIABETES MELLITUS WITHOUT COMPLICATION, WITHOUT LONG-TERM CURRENT USE OF INSULIN (HCC): Primary | ICD-10-CM

## 2018-02-26 DIAGNOSIS — E11.9 TYPE 2 DIABETES MELLITUS WITHOUT COMPLICATION, WITH LONG-TERM CURRENT USE OF INSULIN (HCC): ICD-10-CM

## 2018-02-26 LAB
A/G RATIO: 1.2 (ref 1.1–2.2)
ALBUMIN SERPL-MCNC: 3.8 G/DL (ref 3.4–5)
ALP BLD-CCNC: 82 U/L (ref 40–129)
ALT SERPL-CCNC: 14 U/L (ref 10–40)
ANION GAP SERPL CALCULATED.3IONS-SCNC: 14 MMOL/L (ref 3–16)
AST SERPL-CCNC: 19 U/L (ref 15–37)
BILIRUB SERPL-MCNC: 0.5 MG/DL (ref 0–1)
BUN BLDV-MCNC: 12 MG/DL (ref 7–20)
CALCIUM SERPL-MCNC: 9 MG/DL (ref 8.3–10.6)
CHLORIDE BLD-SCNC: 100 MMOL/L (ref 99–110)
CHOLESTEROL, TOTAL: 195 MG/DL (ref 0–199)
CO2: 27 MMOL/L (ref 21–32)
CREAT SERPL-MCNC: <0.5 MG/DL (ref 0.6–1.2)
CREATININE URINE: 246.1 MG/DL (ref 28–259)
GFR AFRICAN AMERICAN: >60
GFR NON-AFRICAN AMERICAN: >60
GLOBULIN: 3.3 G/DL
GLUCOSE BLD-MCNC: 156 MG/DL (ref 70–99)
HBA1C MFR BLD: 7.2 %
HDLC SERPL-MCNC: 72 MG/DL (ref 40–60)
LDL CHOLESTEROL CALCULATED: 90 MG/DL
MICROALBUMIN UR-MCNC: 4 MG/DL
MICROALBUMIN/CREAT UR-RTO: 16.3 MG/G (ref 0–30)
POTASSIUM SERPL-SCNC: 4 MMOL/L (ref 3.5–5.1)
SODIUM BLD-SCNC: 141 MMOL/L (ref 136–145)
TOTAL PROTEIN: 7.1 G/DL (ref 6.4–8.2)
TRIGL SERPL-MCNC: 167 MG/DL (ref 0–150)
TSH SERPL DL<=0.05 MIU/L-ACNC: 0.17 UIU/ML (ref 0.27–4.2)
VITAMIN D 25-HYDROXY: 30.7 NG/ML
VLDLC SERPL CALC-MCNC: 33 MG/DL

## 2018-02-26 PROCEDURE — 83036 HEMOGLOBIN GLYCOSYLATED A1C: CPT | Performed by: FAMILY MEDICINE

## 2018-02-26 PROCEDURE — 99214 OFFICE O/P EST MOD 30 MIN: CPT | Performed by: FAMILY MEDICINE

## 2018-02-26 RX ORDER — LISINOPRIL AND HYDROCHLOROTHIAZIDE 20; 12.5 MG/1; MG/1
TABLET ORAL
Qty: 180 TABLET | Refills: 3 | Status: SHIPPED | OUTPATIENT
Start: 2018-02-26 | End: 2019-05-10 | Stop reason: SDUPTHER

## 2018-02-26 RX ORDER — FLUOXETINE HYDROCHLORIDE 40 MG/1
CAPSULE ORAL
Qty: 90 CAPSULE | Refills: 3 | Status: SHIPPED | OUTPATIENT
Start: 2018-02-26 | End: 2019-03-22 | Stop reason: SDUPTHER

## 2018-02-26 RX ORDER — SIMVASTATIN 20 MG
20 TABLET ORAL EVERY EVENING
Qty: 90 TABLET | Refills: 3 | Status: SHIPPED | OUTPATIENT
Start: 2018-02-26 | End: 2019-04-26 | Stop reason: SDUPTHER

## 2018-02-26 RX ORDER — GLIPIZIDE 5 MG/1
10 TABLET, FILM COATED, EXTENDED RELEASE ORAL 2 TIMES DAILY
Qty: 360 TABLET | Refills: 3 | Status: SHIPPED | OUTPATIENT
Start: 2018-02-26 | End: 2019-03-01 | Stop reason: SDUPTHER

## 2018-02-26 ASSESSMENT — ENCOUNTER SYMPTOMS
WHEEZING: 0
CHOKING: 0
DIARRHEA: 0
SORE THROAT: 0
VOMITING: 0
CONSTIPATION: 0
COUGH: 0
BACK PAIN: 0
SHORTNESS OF BREATH: 0
RHINORRHEA: 0
TROUBLE SWALLOWING: 0
ABDOMINAL PAIN: 0
NAUSEA: 0

## 2018-02-26 NOTE — PROGRESS NOTES
Antoinette Mina          Chief Complaint   Patient presents with    Diabetes    Obesity    Hypertension    Hyperlipidemia       HPI:     Doing well. Has really tried hard to get DM under better control. Tolerating addition of low dose Victoza well. Eating better in general as well. Weight is down 7 lbs   FBS runs in the low 100's  Taking metformin and glipizide, as well as Victoza 0.6 mg now. Increasing the dose to 1.2 felt like too much, so she backed down. (FBS were in the 70's when she tried the higher dose). No complaints    Current Outpatient Prescriptions on File Prior to Visit   Medication Sig Dispense Refill    Liraglutide (VICTOZA) 18 MG/3ML SOPN SC injection DIAL AND INJECT SUBCUTANEOUSLY 1.2 MG DAILY 6 Pen 2    buPROPion (WELLBUTRIN XL) 150 MG extended release tablet Take 1 tablet by mouth every morning 30 tablet 1    ACCU-CHEK LIBBY PLUS strip USE DAILY WITH METER 100 strip 2    amLODIPine (NORVASC) 10 MG tablet TAKE ONE TABLET BY MOUTH DAILY 90 tablet 3    levothyroxine (SYNTHROID) 175 MCG tablet TAKE ONE TABLET BY MOUTH DAILY 90 tablet 3    Insulin Pen Needle 32G X 4 MM MISC 1 each by Does not apply route daily 100 each 3    Blood Glucose Monitoring Suppl (ACURA BLOOD GLUCOSE METER) W/DEVICE KIT Provide pt with glucometer under forumlary - ACCU-CHEK, Dx Type 2 DM, testing daily 1 kit 0    Lancets MISC 1 po daily, please provide insurance formulary brand - ACCU-CHEK, Dx Type 2 DM, testing daily 100 each 3    Vitamin D (CHOLECALCIFEROL) 1000 UNITS CAPS capsule Take 1,000 Units by mouth daily.  calcium-vitamin D (OSCAL-500) 500-200 MG-UNIT per tablet Take 2 tablets by mouth daily.  aspirin 81 MG tablet Take 81 mg by mouth daily.  benzonatate (TESSALON PERLES) 100 MG capsule Take 1-2 capsules by mouth 3 times daily as needed for Cough 30 capsule 0     No current facility-administered medications on file prior to visit.           Review of Systems   Constitutional:

## 2018-03-07 NOTE — PROGRESS NOTES
Please call Pt with recent bloodwork results:    CMP with glucose 156, otherwise normal  Lipids are well-controlled  TSH slightly suppressed at 0.17  Vit D level is fine at 30  No significant protein in urine    Suppressed TSH suggests that levothyroxine dose of 175 µg might be too high, but I know that she has been on this for several years without problems.   I suggest we recheck this at next appointment and consider adjusting the dose at that time if TSH is still suppressed  Rec continue on vitamin D supplement 2000 units daily

## 2018-05-10 ENCOUNTER — HOSPITAL ENCOUNTER (OUTPATIENT)
Dept: MAMMOGRAPHY | Age: 73
Discharge: HOME | End: 2018-05-10
Attending: FAMILY MEDICINE | Admitting: FAMILY MEDICINE

## 2018-05-10 DIAGNOSIS — Z12.31 ENCOUNTER FOR SCREENING MAMMOGRAM FOR BREAST CANCER: ICD-10-CM

## 2018-08-01 ENCOUNTER — OFFICE VISIT (OUTPATIENT)
Dept: INTERNAL MEDICINE CLINIC | Age: 73
End: 2018-08-01

## 2018-08-01 ENCOUNTER — TELEPHONE (OUTPATIENT)
Dept: INTERNAL MEDICINE CLINIC | Age: 73
End: 2018-08-01

## 2018-08-01 VITALS
DIASTOLIC BLOOD PRESSURE: 68 MMHG | BODY MASS INDEX: 42.94 KG/M2 | HEART RATE: 71 BPM | SYSTOLIC BLOOD PRESSURE: 142 MMHG | WEIGHT: 262 LBS | OXYGEN SATURATION: 96 %

## 2018-08-01 DIAGNOSIS — M65.311 TRIGGER FINGER OF RIGHT THUMB: ICD-10-CM

## 2018-08-01 DIAGNOSIS — E66.01 MORBID OBESITY DUE TO EXCESS CALORIES (HCC): ICD-10-CM

## 2018-08-01 DIAGNOSIS — E11.9 TYPE 2 DIABETES MELLITUS WITHOUT COMPLICATION, WITHOUT LONG-TERM CURRENT USE OF INSULIN (HCC): Primary | ICD-10-CM

## 2018-08-01 LAB — HBA1C MFR BLD: 6.7 %

## 2018-08-01 PROCEDURE — 99213 OFFICE O/P EST LOW 20 MIN: CPT | Performed by: NURSE PRACTITIONER

## 2018-08-01 PROCEDURE — 83036 HEMOGLOBIN GLYCOSYLATED A1C: CPT | Performed by: NURSE PRACTITIONER

## 2018-08-01 ASSESSMENT — ENCOUNTER SYMPTOMS
COUGH: 0
DIARRHEA: 0
VOMITING: 0
SINUS PRESSURE: 0
NAUSEA: 0
FACIAL SWELLING: 0
SORE THROAT: 0

## 2018-08-01 NOTE — PROGRESS NOTES
Negative for adenopathy. Psychiatric/Behavioral: Negative for sleep disturbance and suicidal ideas. Objective:   Physical Exam   Constitutional: She is oriented to person, place, and time. She appears well-developed and well-nourished. No distress. HENT:   Head: Normocephalic and atraumatic. Cardiovascular: Normal rate, regular rhythm and normal heart sounds. Pulmonary/Chest: Effort normal and breath sounds normal. She has no wheezes. She has no rales. Musculoskeletal:   Rt thumb trigger, no swelling, no bruising, no warmth. Neurological: She is alert and oriented to person, place, and time. Normal monofilament exam of B feet, no open sores or lesions  Flaky skin over Rt heel. No drainage. Skin: She is not diaphoretic. Psychiatric: She has a normal mood and affect. Her behavior is normal. Thought content normal.       Assessment:      1. Type 2 diabetes mellitus without complication, without long-term current use of insulin ([facility])  A1c improving! Victoza increase from 0.6 to 1.2 and decrease Glipizide to 1 pill twice a day. Educated pt on monitoring FBS at home and if any increase, advised to call office. Will consider increasing Victoza to 1.8mg in future    -  DIABETES FOOT EXAM  - POCT glycosylated hemoglobin (Hb A1C)  - Liraglutide (VICTOZA) 18 MG/3ML SOPN SC injection; DIAL AND INJECT SUBCUTANEOUSLY 1.2MG DAILY  Dispense: 6 pen; Refill: 1    2. Trigger finger of right thumb  Monitor, consider referral to ortho if symptoms worsen    3. Morbid obesity due to excess calories [facility])  Improving          Plan:      See above plan    Return in about 3 months (around 11/1/2018) for [place].

## 2018-08-06 RX ORDER — LEVOTHYROXINE SODIUM 175 UG/1
TABLET ORAL
Qty: 90 TABLET | Refills: 0 | Status: SHIPPED | OUTPATIENT
Start: 2018-08-06 | End: 2018-11-29 | Stop reason: SDUPTHER

## 2018-08-06 NOTE — TELEPHONE ENCOUNTER
Refill request for LEVOTHYROXINE medication.      Name of Moira C3 Jian      Last visit - 2/26/18     Pending visit - 11/17/18    Last refill -5/8/18      Medication Contract signed -   Last Oarrs ran-         Additional Comments

## 2018-08-07 ENCOUNTER — TELEPHONE (OUTPATIENT)
Dept: INTERNAL MEDICINE CLINIC | Age: 73
End: 2018-08-07

## 2018-10-29 DIAGNOSIS — Z12.11 SCREENING FOR COLON CANCER: Primary | ICD-10-CM

## 2018-10-29 LAB
CONTROL: NORMAL
HEMOCCULT STL QL: NEGATIVE

## 2018-10-29 PROCEDURE — 82274 ASSAY TEST FOR BLOOD FECAL: CPT | Performed by: FAMILY MEDICINE

## 2018-11-29 ENCOUNTER — OFFICE VISIT (OUTPATIENT)
Dept: INTERNAL MEDICINE CLINIC | Age: 73
End: 2018-11-29
Payer: MEDICARE

## 2018-11-29 VITALS
OXYGEN SATURATION: 97 % | BODY MASS INDEX: 43.59 KG/M2 | SYSTOLIC BLOOD PRESSURE: 124 MMHG | DIASTOLIC BLOOD PRESSURE: 66 MMHG | WEIGHT: 266 LBS | HEART RATE: 80 BPM

## 2018-11-29 DIAGNOSIS — E66.01 MORBID OBESITY DUE TO EXCESS CALORIES (HCC): ICD-10-CM

## 2018-11-29 DIAGNOSIS — E03.9 ACQUIRED HYPOTHYROIDISM: ICD-10-CM

## 2018-11-29 DIAGNOSIS — F32.5 MAJOR DEPRESSION IN REMISSION (HCC): ICD-10-CM

## 2018-11-29 DIAGNOSIS — I10 ESSENTIAL HYPERTENSION: ICD-10-CM

## 2018-11-29 DIAGNOSIS — E55.9 VITAMIN D DEFICIENCY: ICD-10-CM

## 2018-11-29 DIAGNOSIS — E11.9 TYPE 2 DIABETES MELLITUS WITHOUT COMPLICATION, WITHOUT LONG-TERM CURRENT USE OF INSULIN (HCC): Primary | ICD-10-CM

## 2018-11-29 DIAGNOSIS — E78.2 MIXED HYPERLIPIDEMIA: ICD-10-CM

## 2018-11-29 LAB — HBA1C MFR BLD: 6.6 %

## 2018-11-29 PROCEDURE — 99214 OFFICE O/P EST MOD 30 MIN: CPT | Performed by: FAMILY MEDICINE

## 2018-11-29 PROCEDURE — 83036 HEMOGLOBIN GLYCOSYLATED A1C: CPT | Performed by: FAMILY MEDICINE

## 2018-11-29 RX ORDER — LEVOTHYROXINE SODIUM 175 UG/1
TABLET ORAL
Qty: 90 TABLET | Refills: 0 | Status: SHIPPED | OUTPATIENT
Start: 2018-11-29 | End: 2019-03-01 | Stop reason: SDUPTHER

## 2018-11-29 RX ORDER — AMLODIPINE BESYLATE 10 MG/1
TABLET ORAL
Qty: 90 TABLET | Refills: 3 | Status: SHIPPED | OUTPATIENT
Start: 2018-11-29 | End: 2020-02-26 | Stop reason: SDUPTHER

## 2018-11-29 ASSESSMENT — ENCOUNTER SYMPTOMS
BACK PAIN: 0
NAUSEA: 0
TROUBLE SWALLOWING: 0
CHOKING: 0
RHINORRHEA: 0
WHEEZING: 0
SORE THROAT: 0
COUGH: 0
SHORTNESS OF BREATH: 0
VOMITING: 0
ABDOMINAL PAIN: 0
CONSTIPATION: 0
DIARRHEA: 0

## 2018-11-29 NOTE — PROGRESS NOTES
Miriamoneil Bocanegra          Chief Complaint   Patient presents with    Diabetes    Hypertension    Hyperlipidemia    Depression       HPI:     Doing well. No complaints    Has really tried hard to get DM under better control. Tolerating addition of Victoza well. Taking metformin and glipizide (5 mg bid, down from 10 mg bid), as well as Victoza 0.9 mg daily. (Tried taking Victoza 1.2 mg daily, but BS were going too low again, into the 70's. So she started a mid-range dose at 0.9 mg)  Eating better in general as well. FBS runs in the low 100's    Hasn't been exercising regularly, but wants to get back to it. I personally reviewed and updated patient's past medical history, past surgical history, family history, allergies, and social history as captured in the relevant section of patient's chart. Current Outpatient Prescriptions   Medication Sig Dispense Refill    amLODIPine (NORVASC) 10 MG tablet TAKE ONE TABLET BY MOUTH DAILY 90 tablet 3    levothyroxine (SYNTHROID) 175 MCG tablet TAKE ONE TABLET BY MOUTH DAILY 90 tablet 0    Liraglutide (VICTOZA) 18 MG/3ML SOPN SC injection Inject 1.2 mg into the skin daily Victoza - 6 pens; lot:  [id number], Exp date:  10/1/19;  No NDC 6 pen 0    blood glucose test strips (ACCU-CHEK LIBBY PLUS) strip USE ONE STRIP TO TEST DAILY 100 strip 3    glipiZIDE (GLUCOTROL XL) 5 MG extended release tablet TAKE TWO TABLETS BY MOUTH TWICE A  tablet 2    Insulin Pen Needle (PEN NEEDLES) 32G X 4 MM MISC USE ONCE DAILY FOR VICTOZA 100 each 2    FLUoxetine (PROZAC) 40 MG capsule TAKE ONE CAPSULE BY MOUTH DAILY 90 capsule 3    lisinopril-hydrochlorothiazide (PRINZIDE;ZESTORETIC) 20-12.5 MG per tablet TAKE TWO TABLETS BY MOUTH DAILY 180 tablet 3    glipiZIDE (GLIPIZIDE XL) 5 MG extended release tablet Take 2 tablets by mouth 2 times daily 360 tablet 3    metFORMIN (GLUCOPHAGE) 1000 MG tablet TAKE ONE TABLET BY MOUTH TWICE A DAY WITH MEALS 180 tablet 3    simvastatin (ZOCOR) 20 MG tablet Take 1 tablet by mouth every evening 90 tablet 3    benzonatate (TESSALON PERLES) 100 MG capsule Take 1-2 capsules by mouth 3 times daily as needed for Cough 30 capsule 0    Blood Glucose Monitoring Suppl (ACURA BLOOD GLUCOSE METER) W/DEVICE KIT Provide pt with glucometer under forumlary - ACCU-CHEK, Dx Type 2 DM, testing daily 1 kit 0    Lancets MISC 1 po daily, please provide insurance formulary brand - ACCU-CHEK, Dx Type 2 DM, testing daily 100 each 3    Vitamin D (CHOLECALCIFEROL) 1000 UNITS CAPS capsule Take 1,000 Units by mouth daily.  calcium-vitamin D (OSCAL-500) 500-200 MG-UNIT per tablet Take 2 tablets by mouth daily.  aspirin 81 MG tablet Take 81 mg by mouth daily. No current facility-administered medications for this visit. Review of Systems   Constitutional: Negative for activity change, appetite change, chills, fatigue, fever and unexpected weight change. HENT: Negative for congestion, nosebleeds, postnasal drip, rhinorrhea, sneezing, sore throat and trouble swallowing. Eyes: Negative for visual disturbance. Respiratory: Negative for cough, choking, shortness of breath and wheezing. Cardiovascular: Negative for chest pain and leg swelling. Gastrointestinal: Negative for abdominal pain, constipation, diarrhea, nausea and vomiting. Genitourinary: Negative for difficulty urinating, dysuria, vaginal discharge and vaginal pain. Musculoskeletal: Positive for arthralgias (intermittent knee pains). Negative for back pain, neck pain and neck stiffness. Skin: Negative for rash. Neurological: Negative for dizziness, weakness, numbness and headaches. Psychiatric/Behavioral: Negative for dysphoric mood and sleep disturbance. The patient is not nervous/anxious. Physical Exam   Constitutional: She is oriented to person, place, and time. She appears well-developed and well-nourished. No distress.    Pleasant, obese   HENT:   Head: Normocephalic and atraumatic. Nose: Nose normal.   Mouth/Throat: Oropharynx is clear and moist.   Eyes: Pupils are equal, round, and reactive to light. Conjunctivae and EOM are normal.   Neck: Normal range of motion. Neck supple. No thyromegaly present. Cardiovascular: Normal rate, regular rhythm and normal heart sounds. No carotid bruits   Pulmonary/Chest: Effort normal and breath sounds normal.   Abdominal: Soft. Bowel sounds are normal. There is no tenderness. Large pannus    Musculoskeletal: Normal range of motion. She exhibits no edema. Lymphadenopathy:     She has no cervical adenopathy. Neurological: She is alert and oriented to person, place, and time. Skin: Skin is warm and dry. She is not diaphoretic. Psychiatric: She has a normal mood and affect. Her behavior is normal. Judgment and thought content normal.         Vitals:    11/29/18 1320   BP: 124/66   Site: Right Upper Arm   Position: Sitting   Cuff Size: Medium Adult   Pulse: 80   SpO2: 97%   Weight: 266 lb (120.7 kg)     Body mass index is 43.59 kg/m². Assessment/Plan:    1. Type 2 diabetes mellitus without complication, without long-term current use of insulin ([facility])  POCT A1c is 6.6 (<--6.7 in 8/18 <-- 7.2)  Working closely with Thomas Escobar for diabetic support  I applauded her efforts to improve her diet and incorporate a third effective medication  Continue on metformin, glipizide (down to once daily), and Victoza 0.9 mg  Eye and foot exam is up-to-date    - POCT glycosylated hemoglobin (Hb A1C)    2. Morbid obesity due to excess calories ([address]  - weight is stable in the past few months  -s/p gastric bypass in 2002, lost 160 lbs afterwards and has resumed trying to slowly lose more  -continue with healthy diet with portion control  -encouraged her to resume regular exercise, with walking    3.  Essential hypertension  Well-controlled  Continue on lisinopril/HCTZ 20/12.5 mg, and amlodipine 10 mg  Recommend low salt diet  Recommend

## 2019-01-09 ENCOUNTER — TELEPHONE (OUTPATIENT)
Dept: INTERNAL MEDICINE CLINIC | Age: 74
End: 2019-01-09

## 2019-02-28 ENCOUNTER — HOSPITAL ENCOUNTER (OUTPATIENT)
Age: 74
Discharge: HOME | End: 2019-02-28
Payer: MEDICARE

## 2019-02-28 DIAGNOSIS — E78.2 MIXED HYPERLIPIDEMIA: ICD-10-CM

## 2019-02-28 DIAGNOSIS — E03.9 ACQUIRED HYPOTHYROIDISM: ICD-10-CM

## 2019-02-28 DIAGNOSIS — I10 ESSENTIAL HYPERTENSION: ICD-10-CM

## 2019-02-28 DIAGNOSIS — E55.9 VITAMIN D DEFICIENCY: ICD-10-CM

## 2019-02-28 LAB
A/G RATIO: 1.3 (ref 1.1–2.2)
ALBUMIN SERPL-MCNC: 3.9 G/DL (ref 3.4–5)
ALP BLD-CCNC: 84 U/L (ref 40–129)
ALT SERPL-CCNC: 14 U/L (ref 10–40)
ANION GAP SERPL CALCULATED.3IONS-SCNC: 11 MMOL/L (ref 3–16)
AST SERPL-CCNC: 16 U/L (ref 15–37)
BILIRUB SERPL-MCNC: 0.6 MG/DL (ref 0–1)
BUN BLDV-MCNC: 14 MG/DL (ref 7–20)
CALCIUM SERPL-MCNC: 9.7 MG/DL (ref 8.3–10.6)
CHLORIDE BLD-SCNC: 100 MMOL/L (ref 99–110)
CHOLESTEROL, TOTAL: 158 MG/DL (ref 0–199)
CO2: 29 MMOL/L (ref 21–32)
CREAT SERPL-MCNC: <0.5 MG/DL (ref 0.6–1.2)
GFR AFRICAN AMERICAN: >60
GFR NON-AFRICAN AMERICAN: >60
GLOBULIN: 3.1 G/DL
GLUCOSE BLD-MCNC: 131 MG/DL (ref 70–99)
HDLC SERPL-MCNC: 70 MG/DL (ref 40–60)
LDL CHOLESTEROL CALCULATED: 68 MG/DL
POTASSIUM SERPL-SCNC: 4.6 MMOL/L (ref 3.5–5.1)
SODIUM BLD-SCNC: 140 MMOL/L (ref 136–145)
TOTAL PROTEIN: 7 G/DL (ref 6.4–8.2)
TRIGL SERPL-MCNC: 99 MG/DL (ref 0–150)
TSH SERPL DL<=0.05 MIU/L-ACNC: 0.11 UIU/ML (ref 0.27–4.2)
VITAMIN D 25-HYDROXY: 28.6 NG/ML
VLDLC SERPL CALC-MCNC: 20 MG/DL

## 2019-02-28 PROCEDURE — 84443 ASSAY THYROID STIM HORMONE: CPT

## 2019-02-28 PROCEDURE — 36415 COLL VENOUS BLD VENIPUNCTURE: CPT

## 2019-02-28 PROCEDURE — 80053 COMPREHEN METABOLIC PANEL: CPT

## 2019-02-28 PROCEDURE — 80061 LIPID PANEL: CPT

## 2019-02-28 PROCEDURE — 82306 VITAMIN D 25 HYDROXY: CPT

## 2019-03-01 ENCOUNTER — OFFICE VISIT (OUTPATIENT)
Dept: INTERNAL MEDICINE CLINIC | Age: 74
End: 2019-03-01
Payer: MEDICARE

## 2019-03-01 VITALS
SYSTOLIC BLOOD PRESSURE: 132 MMHG | OXYGEN SATURATION: 98 % | DIASTOLIC BLOOD PRESSURE: 74 MMHG | BODY MASS INDEX: 43.59 KG/M2 | HEART RATE: 67 BPM | WEIGHT: 266 LBS

## 2019-03-01 DIAGNOSIS — B35.3 TINEA PEDIS OF BOTH FEET: ICD-10-CM

## 2019-03-01 DIAGNOSIS — E03.9 ACQUIRED HYPOTHYROIDISM: ICD-10-CM

## 2019-03-01 DIAGNOSIS — E55.9 VITAMIN D DEFICIENCY: ICD-10-CM

## 2019-03-01 DIAGNOSIS — E11.9 TYPE 2 DIABETES MELLITUS WITHOUT COMPLICATION, WITHOUT LONG-TERM CURRENT USE OF INSULIN (HCC): Primary | ICD-10-CM

## 2019-03-01 LAB
CREATININE URINE POCT: NORMAL
HBA1C MFR BLD: 6.7 %
MICROALBUMIN/CREAT 24H UR: NORMAL MG/G{CREAT}
MICROALBUMIN/CREAT UR-RTO: NORMAL

## 2019-03-01 PROCEDURE — 82044 UR ALBUMIN SEMIQUANTITATIVE: CPT | Performed by: NURSE PRACTITIONER

## 2019-03-01 PROCEDURE — 99213 OFFICE O/P EST LOW 20 MIN: CPT | Performed by: NURSE PRACTITIONER

## 2019-03-01 PROCEDURE — 83036 HEMOGLOBIN GLYCOSYLATED A1C: CPT | Performed by: NURSE PRACTITIONER

## 2019-03-01 RX ORDER — GLIPIZIDE 5 MG/1
10 TABLET, FILM COATED, EXTENDED RELEASE ORAL 2 TIMES DAILY
Qty: 360 TABLET | Refills: 3 | Status: SHIPPED | OUTPATIENT
Start: 2019-03-01 | End: 2020-05-03

## 2019-03-01 RX ORDER — LEVOTHYROXINE SODIUM 175 UG/1
TABLET ORAL
Qty: 90 TABLET | Refills: 1 | Status: SHIPPED | OUTPATIENT
Start: 2019-03-01 | End: 2019-06-06

## 2019-03-01 ASSESSMENT — PATIENT HEALTH QUESTIONNAIRE - PHQ9
1. LITTLE INTEREST OR PLEASURE IN DOING THINGS: 0
SUM OF ALL RESPONSES TO PHQ QUESTIONS 1-9: 0
SUM OF ALL RESPONSES TO PHQ9 QUESTIONS 1 & 2: 0
SUM OF ALL RESPONSES TO PHQ QUESTIONS 1-9: 0
2. FEELING DOWN, DEPRESSED OR HOPELESS: 0

## 2019-03-01 ASSESSMENT — ENCOUNTER SYMPTOMS
SINUS PRESSURE: 0
NAUSEA: 0
FACIAL SWELLING: 0
DIARRHEA: 0
COUGH: 0
VOMITING: 0
SORE THROAT: 0

## 2019-04-22 ENCOUNTER — TELEPHONE (OUTPATIENT)
Dept: FAMILY MEDICINE CLINIC | Age: 74
End: 2019-04-22

## 2019-04-22 NOTE — TELEPHONE ENCOUNTER
Louann Ormond is a patient of Dr Rissa Michel the patient is asking if you would take her on as a patient after Dr Chinyere Escobar.

## 2019-04-26 DIAGNOSIS — E78.2 MIXED HYPERLIPIDEMIA: ICD-10-CM

## 2019-04-26 RX ORDER — SIMVASTATIN 20 MG
TABLET ORAL
Qty: 90 TABLET | Refills: 2 | Status: SHIPPED | OUTPATIENT
Start: 2019-04-26 | End: 2020-04-04

## 2019-04-26 NOTE — TELEPHONE ENCOUNTER
Refill request for simvastatin  medication.      Name of Pharmacy- [facility]    Last visit - 3-1-19     Pending visit - 5-30-19    Last refill -2-26-18    Medication Contract signed -   Last Dobbs Breath ran-     Additional Comments

## 2019-05-05 DIAGNOSIS — E11.9 TYPE 2 DIABETES MELLITUS WITHOUT COMPLICATION, WITHOUT LONG-TERM CURRENT USE OF INSULIN (HCC): ICD-10-CM

## 2019-05-06 NOTE — TELEPHONE ENCOUNTER
Refill request for metformin medication.      Name of Mounika EasilyDo    Last visit - 3/1/19     Pending visit - 5/30/19    Last refill -2/26/18  3 refills

## 2019-05-30 ENCOUNTER — HOSPITAL ENCOUNTER (OUTPATIENT)
Age: 74
Discharge: HOME | End: 2019-05-30
Payer: MEDICARE

## 2019-05-30 ENCOUNTER — OFFICE VISIT (OUTPATIENT)
Dept: INTERNAL MEDICINE CLINIC | Age: 74
End: 2019-05-30
Payer: MEDICARE

## 2019-05-30 VITALS
WEIGHT: 265 LBS | HEART RATE: 69 BPM | HEIGHT: 65 IN | DIASTOLIC BLOOD PRESSURE: 72 MMHG | SYSTOLIC BLOOD PRESSURE: 122 MMHG | BODY MASS INDEX: 44.15 KG/M2 | OXYGEN SATURATION: 97 %

## 2019-05-30 DIAGNOSIS — E66.01 MORBID OBESITY DUE TO EXCESS CALORIES (HCC): ICD-10-CM

## 2019-05-30 DIAGNOSIS — E03.9 ACQUIRED HYPOTHYROIDISM: ICD-10-CM

## 2019-05-30 DIAGNOSIS — I10 ESSENTIAL HYPERTENSION: ICD-10-CM

## 2019-05-30 DIAGNOSIS — E55.9 VITAMIN D DEFICIENCY: ICD-10-CM

## 2019-05-30 DIAGNOSIS — L98.9 SKIN LESIONS: ICD-10-CM

## 2019-05-30 DIAGNOSIS — E11.9 TYPE 2 DIABETES MELLITUS WITHOUT COMPLICATION, WITHOUT LONG-TERM CURRENT USE OF INSULIN (HCC): Primary | ICD-10-CM

## 2019-05-30 DIAGNOSIS — E78.2 MIXED HYPERLIPIDEMIA: ICD-10-CM

## 2019-05-30 DIAGNOSIS — E11.9 TYPE 2 DIABETES MELLITUS WITHOUT COMPLICATION, WITHOUT LONG-TERM CURRENT USE OF INSULIN (HCC): ICD-10-CM

## 2019-05-30 DIAGNOSIS — F32.5 MAJOR DEPRESSION IN REMISSION (HCC): ICD-10-CM

## 2019-05-30 LAB
ANION GAP SERPL CALCULATED.3IONS-SCNC: 12 MMOL/L (ref 3–16)
BUN BLDV-MCNC: 12 MG/DL (ref 7–20)
CALCIUM SERPL-MCNC: 9.6 MG/DL (ref 8.3–10.6)
CHLORIDE BLD-SCNC: 98 MMOL/L (ref 99–110)
CO2: 27 MMOL/L (ref 21–32)
CREAT SERPL-MCNC: 0.5 MG/DL (ref 0.6–1.2)
GFR AFRICAN AMERICAN: >60
GFR NON-AFRICAN AMERICAN: >60
GLUCOSE BLD-MCNC: 127 MG/DL (ref 70–99)
HBA1C MFR BLD: 6.5 %
POTASSIUM SERPL-SCNC: 4.3 MMOL/L (ref 3.5–5.1)
SODIUM BLD-SCNC: 137 MMOL/L (ref 136–145)
TSH SERPL DL<=0.05 MIU/L-ACNC: 0.06 UIU/ML (ref 0.27–4.2)
VITAMIN D 25-HYDROXY: 34.7 NG/ML

## 2019-05-30 PROCEDURE — 83036 HEMOGLOBIN GLYCOSYLATED A1C: CPT | Performed by: FAMILY MEDICINE

## 2019-05-30 PROCEDURE — 80048 BASIC METABOLIC PNL TOTAL CA: CPT

## 2019-05-30 PROCEDURE — 99214 OFFICE O/P EST MOD 30 MIN: CPT | Performed by: FAMILY MEDICINE

## 2019-05-30 PROCEDURE — 82306 VITAMIN D 25 HYDROXY: CPT

## 2019-05-30 PROCEDURE — 84443 ASSAY THYROID STIM HORMONE: CPT

## 2019-05-30 PROCEDURE — 36415 COLL VENOUS BLD VENIPUNCTURE: CPT

## 2019-05-30 ASSESSMENT — ENCOUNTER SYMPTOMS
RHINORRHEA: 0
WHEEZING: 0
SORE THROAT: 0
NAUSEA: 0
DIARRHEA: 0
CONSTIPATION: 0
BACK PAIN: 0
CHOKING: 0
TROUBLE SWALLOWING: 0
ABDOMINAL PAIN: 0
SHORTNESS OF BREATH: 0
VOMITING: 0
COUGH: 0

## 2019-05-30 NOTE — PROGRESS NOTES
Juan Castro          Chief Complaint   Patient presents with    Diabetes    Hypertension    Hyperlipidemia    Obesity       HPI:     Doing well. No complaints  Weight is stable    Some skin lesions on her arms from years of sun exposure. Has really tried hard to get DM under better control. Tolerating addition of Victoza well. Taking metformin and glipizide (5 mg bid, down from 10 mg bid), as well as Victoza 0.9 mg daily. (Tried taking Victoza 1.2 mg daily, but BS were going too low again, into the 70's. So she started a mid-range dose at 0.9 mg)  Eating better in general as well. FBS runs in the low 100's    Hasn't been exercising regularly, but wants to get back to it. I personally reviewed and updated patient's past medical history, past surgical history, family history, allergies, and social history as captured in the relevant section of patient's chart. Current Outpatient Prescriptions   Medication Sig Dispense Refill    amLODIPine (NORVASC) 10 MG tablet TAKE ONE TABLET BY MOUTH DAILY 90 tablet 3    levothyroxine (SYNTHROID) 175 MCG tablet TAKE ONE TABLET BY MOUTH DAILY 90 tablet 0    Liraglutide (VICTOZA) 18 MG/3ML SOPN SC injection Inject 1.2 mg into the skin daily Victoza - 6 pens; lot:  [id number], Exp date:  10/1/19;  No NDC 6 pen 0    blood glucose test strips (ACCU-CHEK LIBBY PLUS) strip USE ONE STRIP TO TEST DAILY 100 strip 3    glipiZIDE (GLUCOTROL XL) 5 MG extended release tablet TAKE TWO TABLETS BY MOUTH TWICE A  tablet 2    Insulin Pen Needle (PEN NEEDLES) 32G X 4 MM MISC USE ONCE DAILY FOR VICTOZA 100 each 2    FLUoxetine (PROZAC) 40 MG capsule TAKE ONE CAPSULE BY MOUTH DAILY 90 capsule 3    lisinopril-hydrochlorothiazide (PRINZIDE;ZESTORETIC) 20-12.5 MG per tablet TAKE TWO TABLETS BY MOUTH DAILY 180 tablet 3    glipiZIDE (GLIPIZIDE XL) 5 MG extended release tablet Take 2 tablets by mouth 2 times daily 360 tablet 3    metFORMIN (GLUCOPHAGE) 1000 MG tablet TAKE ONE TABLET BY MOUTH TWICE A DAY WITH MEALS 180 tablet 3    simvastatin (ZOCOR) 20 MG tablet Take 1 tablet by mouth every evening 90 tablet 3    benzonatate (TESSALON PERLES) 100 MG capsule Take 1-2 capsules by mouth 3 times daily as needed for Cough 30 capsule 0    Blood Glucose Monitoring Suppl (ACURA BLOOD GLUCOSE METER) W/DEVICE KIT Provide pt with glucometer under forumlary - ACCU-CHEK, Dx Type 2 DM, testing daily 1 kit 0    Lancets MISC 1 po daily, please provide insurance formulary brand - ACCU-CHEK, Dx Type 2 DM, testing daily 100 each 3    Vitamin D (CHOLECALCIFEROL) 1000 UNITS CAPS capsule Take 1,000 Units by mouth daily.  calcium-vitamin D (OSCAL-500) 500-200 MG-UNIT per tablet Take 2 tablets by mouth daily.  aspirin 81 MG tablet Take 81 mg by mouth daily. No current facility-administered medications for this visit. Review of Systems   Constitutional: Negative for activity change, appetite change, chills, fatigue, fever and unexpected weight change. HENT: Negative for congestion, nosebleeds, postnasal drip, rhinorrhea, sneezing, sore throat and trouble swallowing. Eyes: Negative for visual disturbance. Respiratory: Negative for cough, choking, shortness of breath and wheezing. Cardiovascular: Negative for chest pain and leg swelling. Gastrointestinal: Negative for abdominal pain, constipation, diarrhea, nausea and vomiting. Genitourinary: Negative for difficulty urinating, dysuria, vaginal discharge and vaginal pain. Musculoskeletal: Positive for arthralgias (intermittent knee pains). Negative for back pain, neck pain and neck stiffness. Skin: Negative for rash. Spots of forearms   Neurological: Negative for dizziness, weakness, numbness and headaches. Psychiatric/Behavioral: Negative for dysphoric mood and sleep disturbance. The patient is not nervous/anxious. Physical Exam   Constitutional: She is oriented to person, place, and time.  She appears well-developed and well-nourished. No distress. Pleasant, obese   HENT:   Head: Normocephalic and atraumatic. Nose: Nose normal.   Mouth/Throat: Oropharynx is clear and moist.   Eyes: Pupils are equal, round, and reactive to light. Conjunctivae and EOM are normal.   Neck: Normal range of motion. Neck supple. No thyromegaly present. Cardiovascular: Normal rate, regular rhythm and normal heart sounds. No carotid bruits   Pulmonary/Chest: Effort normal and breath sounds normal.   Abdominal: Soft. Bowel sounds are normal. There is no tenderness. Large pannus    Musculoskeletal: Normal range of motion. She exhibits no edema. Lymphadenopathy:     She has no cervical adenopathy. Neurological: She is alert and oriented to person, place, and time. Skin: Skin is warm and dry. She is not diaphoretic. Scattered small, erythematous, scaly patches on her forearms   Psychiatric: She has a normal mood and affect. Her behavior is normal. Judgment and thought content normal.         Vitals:    05/30/19 0754   BP: 122/72   Site: Right Upper Arm   Position: Sitting   Cuff Size: Large Adult   Pulse: 69   SpO2: 97%   Weight: 265 lb (120.2 kg)   Height: 5' 5\" (1.651 m)     Body mass index is 44.1 kg/m². Labs from 2/19:  CMP ok  TSH slightly suppressed, we should recheck this with next set of bloodwork and consider lowering levothyroxine dose if remains suppressed  Lipids are well-controlled  Vit D just a little low at 29, continue on supplement    Assessment/Plan:    1.  Type 2 diabetes mellitus without complication, without long-term current use of insulin ([facility])  POCT A1c is 6.5 (<-- 6.6 <--6.7 in 8/18 <-- 7.2)  She has been working closely with Manav Webb for diabetic support  I applauded her efforts to improve her diet and incorporate a third effective medication  Continue on metformin, glipizide (cut down to 5 mg once daily), and Victoza 0.9 mg  Foot exam is up-to-date  Encouraged eye exam    - POCT glycosylated hemoglobin (Hb A1C)  - BASIC METABOLIC PANEL; Future    2. Morbid obesity due to excess calories ([address]  - weight is stable   -s/p gastric bypass in 2002, lost 160 lbs afterwards and has resumed trying to slowly lose more  -continue with healthy diet with portion control  -encouraged her to resume regular exercise, with walking    3. Essential hypertension  Well-controlled  Continue on lisinopril/HCTZ 20/12.5 mg, and amlodipine 10 mg  Recommend low salt diet  Recommend regular exercise and weight loss    4. Mixed hyperlipidemia  Stable, on zocor 20 mg  Tolerating statin well without side effect  Recommend healthy Mediterranean diet    5. Acquired hypothyroidism  Continues on levothyroxine 175 µg  TSH was slightly suppressed in 2/19   Recheck TSH and lower dose if persists    - TSH without Reflex; Future    6. Major Depression in remission  Stable on fluoxetine 40 mg for some time  Pt chooses to continue on this medication     7. Vitamin D deficiency  Continues on Vit D3 2000 Units daily    - Vitamin D 25 Hydroxy; Future    8. Skin lesions  Placed referral to derm for eval and tx of suspected actinic keratoses on forearms    - Jud Diaz DO, Dermatology, [facility]    I notified Pt  of my upcoming departure, and helped guide her on plans for f/u      Return in about 3 months (around 8/30/2019) for DM, weight, hypothyroid with Dr Paulo Monroy.

## 2019-06-06 DIAGNOSIS — E03.9 ACQUIRED HYPOTHYROIDISM: Primary | ICD-10-CM

## 2019-06-06 RX ORDER — LEVOTHYROXINE SODIUM 0.15 MG/1
150 TABLET ORAL DAILY
Qty: 90 TABLET | Refills: 0 | Status: SHIPPED | OUTPATIENT
Start: 2019-06-06 | End: 2019-09-20 | Stop reason: SDUPTHER

## 2019-06-06 NOTE — RESULT ENCOUNTER NOTE
Please call Pt with recent bloodwork results:    BMP ok  TSH is suppressed further; recommend backing down on levothyroxine dose a bit  Vit D level is better at 35    I sent a new Rx for levothyroxine 150 mcg to [facility] (a 90 day supply but I would also like her to recheck her TSH in another 6 weeks to recheck)

## 2019-06-20 ENCOUNTER — HOSPITAL ENCOUNTER (OUTPATIENT)
Dept: WOMENS IMAGING | Age: 74
Discharge: HOME | End: 2019-06-20
Payer: MEDICARE

## 2019-06-20 DIAGNOSIS — Z12.31 ENCOUNTER FOR SCREENING MAMMOGRAM FOR BREAST CANCER: ICD-10-CM

## 2019-06-20 PROCEDURE — 77063 BREAST TOMOSYNTHESIS BI: CPT

## 2019-07-10 ENCOUNTER — OFFICE VISIT (OUTPATIENT)
Dept: INTERNAL MEDICINE CLINIC | Age: 74
End: 2019-07-10
Payer: MEDICARE

## 2019-07-10 VITALS
HEIGHT: 65 IN | HEART RATE: 67 BPM | DIASTOLIC BLOOD PRESSURE: 62 MMHG | SYSTOLIC BLOOD PRESSURE: 138 MMHG | OXYGEN SATURATION: 96 % | WEIGHT: 258 LBS | RESPIRATION RATE: 14 BRPM | BODY MASS INDEX: 42.99 KG/M2

## 2019-07-10 DIAGNOSIS — E78.2 MIXED HYPERLIPIDEMIA: ICD-10-CM

## 2019-07-10 DIAGNOSIS — E03.9 ACQUIRED HYPOTHYROIDISM: Primary | ICD-10-CM

## 2019-07-10 DIAGNOSIS — E11.9 TYPE 2 DIABETES MELLITUS WITHOUT COMPLICATION, WITHOUT LONG-TERM CURRENT USE OF INSULIN (HCC): ICD-10-CM

## 2019-07-10 DIAGNOSIS — I10 ESSENTIAL HYPERTENSION: ICD-10-CM

## 2019-07-10 PROCEDURE — 99214 OFFICE O/P EST MOD 30 MIN: CPT | Performed by: INTERNAL MEDICINE

## 2019-07-10 ASSESSMENT — PATIENT HEALTH QUESTIONNAIRE - PHQ9
SUM OF ALL RESPONSES TO PHQ QUESTIONS 1-9: 0
SUM OF ALL RESPONSES TO PHQ QUESTIONS 1-9: 0
SUM OF ALL RESPONSES TO PHQ9 QUESTIONS 1 & 2: 0
2. FEELING DOWN, DEPRESSED OR HOPELESS: 0
1. LITTLE INTEREST OR PLEASURE IN DOING THINGS: 0

## 2019-07-10 ASSESSMENT — ENCOUNTER SYMPTOMS
ABDOMINAL DISTENTION: 0
CHEST TIGHTNESS: 0
SHORTNESS OF BREATH: 0
VOMITING: 0
CONSTIPATION: 0
DIARRHEA: 0
WHEEZING: 0
COUGH: 0
BACK PAIN: 0
NAUSEA: 0

## 2019-07-22 ENCOUNTER — OFFICE VISIT (OUTPATIENT)
Dept: INTERNAL MEDICINE CLINIC | Age: 74
End: 2019-07-22
Payer: MEDICARE

## 2019-07-22 VITALS
OXYGEN SATURATION: 97 % | HEIGHT: 65 IN | WEIGHT: 255 LBS | RESPIRATION RATE: 16 BRPM | BODY MASS INDEX: 42.49 KG/M2 | HEART RATE: 96 BPM | SYSTOLIC BLOOD PRESSURE: 132 MMHG | DIASTOLIC BLOOD PRESSURE: 62 MMHG

## 2019-07-22 DIAGNOSIS — L24.3 IRRITANT CONTACT DERMATITIS DUE TO COSMETICS: Primary | ICD-10-CM

## 2019-07-22 PROCEDURE — 99213 OFFICE O/P EST LOW 20 MIN: CPT | Performed by: NURSE PRACTITIONER

## 2019-07-22 RX ORDER — METHYLPREDNISOLONE 4 MG/1
TABLET ORAL
Qty: 1 KIT | Refills: 0 | Status: SHIPPED | OUTPATIENT
Start: 2019-07-22 | End: 2019-07-28

## 2019-07-22 RX ORDER — FLUTICASONE PROPIONATE 0.05 MG/G
OINTMENT TOPICAL
Qty: 1 TUBE | Refills: 0 | Status: SHIPPED | OUTPATIENT
Start: 2019-07-22 | End: 2020-07-01

## 2019-07-22 ASSESSMENT — ENCOUNTER SYMPTOMS
COUGH: 0
SHORTNESS OF BREATH: 0
ALLERGIC/IMMUNOLOGIC NEGATIVE: 1
DIARRHEA: 0
ABDOMINAL PAIN: 0
NAUSEA: 0
CONSTIPATION: 0
CHEST TIGHTNESS: 0
VOMITING: 0

## 2019-07-22 NOTE — PROGRESS NOTES
SUBJECTIVE:  Brandon Rutledge a 68 y. o.female    Chief Complaint   Patient presents with    Facial Swelling     Both ears swelling and itchy       Itching of both ears started this past weekend and has tried benadryl which has helped a little. Out side of ears itching and behind ears. No cuts or lesion. No bug bites that she knows of. No new lotions, soaps. Did use bug spray but not near face. Denies fever/chills. No shortness of breath. No chest pain. No dizziness. No syncope. No numbness/tingling. No issues with bowel or bladder. No hematuria. No melena. Past Medical History:   Diagnosis Date    DJD (degenerative joint disease)     Hyperlipidemia     Hypertension     Hypothyroidism     Morbid obesity due to excess calories ([facility] 2016    Type II or unspecified type diabetes mellitus without mention of complication, not stated as uncontrolled        Past Surgical History:   Procedure Laterality Date    CHOLECYSTECTOMY      COLONOSCOPY      DILATION AND CURETTAGE OF UTERUS      GASTRIC BYPASS SURGERY      HERNIA REPAIR      HYSTERECTOMY, TOTAL ABDOMINAL      KNEE SURGERY      TUBAL LIGATION         History reviewed. No pertinent family history. Social History     Socioeconomic History    Marital status:      Spouse name: Not on file    Number of children: Not on file    Years of education: Not on file    Highest education level: Not on file   Occupational History    Not on file   Social Needs    Financial resource strain: Not on file    Food insecurity:     Worry: Not on file     Inability: Not on file    Transportation needs:     Medical: Not on file     Non-medical: Not on file   Tobacco Use    Smoking status: Former Smoker     Packs/day: 1.00     Years: 15.00     Pack years: 15.00     Types: Cigarettes     Last attempt to quit: 1978     Years since quittin.5    Smokeless tobacco: Never Used   Substance and Sexual Activity    Alcohol use: No    Drug use:  No scheduled with Dr. Chele Scott.

## 2019-07-29 ENCOUNTER — OFFICE VISIT (OUTPATIENT)
Dept: FAMILY MEDICINE CLINIC | Age: 74
End: 2019-07-29
Payer: MEDICARE

## 2019-07-29 VITALS
HEIGHT: 65 IN | BODY MASS INDEX: 43.25 KG/M2 | OXYGEN SATURATION: 98 % | HEART RATE: 111 BPM | WEIGHT: 259.6 LBS | SYSTOLIC BLOOD PRESSURE: 132 MMHG | DIASTOLIC BLOOD PRESSURE: 72 MMHG

## 2019-07-29 DIAGNOSIS — L50.9 URTICARIA: Primary | ICD-10-CM

## 2019-07-29 LAB
HCT VFR BLD CALC: 50 % (ref 36–48)
HEMOGLOBIN: 16.4 G/DL (ref 12–16)
MCH RBC QN AUTO: 30.7 PG (ref 26–34)
MCHC RBC AUTO-ENTMCNC: 32.9 G/DL (ref 31–36)
MCV RBC AUTO: 93.2 FL (ref 80–100)
PDW BLD-RTO: 14 % (ref 12.4–15.4)
PLATELET # BLD: 248 K/UL (ref 135–450)
PLATELET SLIDE REVIEW: ADEQUATE
PMV BLD AUTO: 12.2 FL (ref 5–10.5)
RBC # BLD: 5.36 M/UL (ref 4–5.2)
WBC # BLD: 11.7 K/UL (ref 4–11)

## 2019-07-29 PROCEDURE — 99213 OFFICE O/P EST LOW 20 MIN: CPT | Performed by: PHYSICIAN ASSISTANT

## 2019-07-29 PROCEDURE — 36415 COLL VENOUS BLD VENIPUNCTURE: CPT | Performed by: PHYSICIAN ASSISTANT

## 2019-07-29 RX ORDER — CLINDAMYCIN HYDROCHLORIDE 150 MG/1
CAPSULE ORAL
COMMUNITY
Start: 2019-07-26 | End: 2020-04-20

## 2019-07-29 RX ORDER — HYDROXYZINE PAMOATE 25 MG/1
25 CAPSULE ORAL 3 TIMES DAILY PRN
Qty: 30 CAPSULE | Refills: 0 | Status: SHIPPED | OUTPATIENT
Start: 2019-07-29 | End: 2019-09-07 | Stop reason: SDUPTHER

## 2019-07-29 RX ORDER — CLOTRIMAZOLE AND BETAMETHASONE DIPROPIONATE 10; .64 MG/G; MG/G
CREAM TOPICAL
COMMUNITY
Start: 2019-07-26

## 2019-07-29 ASSESSMENT — ENCOUNTER SYMPTOMS
RESPIRATORY NEGATIVE: 1
ROS SKIN COMMENTS: URTICARIA

## 2019-07-30 ENCOUNTER — TELEPHONE (OUTPATIENT)
Dept: FAMILY MEDICINE CLINIC | Age: 74
End: 2019-07-30

## 2019-07-31 LAB
ALLERGEN CLAMS IGE: <0.1 KU/L
ALLERGEN CODFISH IGE: <0.1 KU/L
ALLERGEN CORN IGE: <0.1 KU/L
ALLERGEN COW MILK IGE: <0.1 KU/L
ALLERGEN EGG WHITE IGE: 0.27 KU/L
ALLERGEN PEANUT (F13) IGE: <0.1 KU/L
ALLERGEN SCALLOP IGE: <0.1 KU/L
ALLERGEN SEE NOTE: ABNORMAL
ALLERGEN SHRIMP IGE: 0.16 KU/L
ALLERGEN SOYBEAN IGE: <0.1 KU/L
ALLERGEN WALNUT IGE: <0.1 KU/L
ALLERGEN WHEAT IGE: <0.1 KU/L
IGE: 274 KU/L

## 2019-08-01 ENCOUNTER — HOSPITAL ENCOUNTER (EMERGENCY)
Age: 74
Discharge: HOME | End: 2019-08-01
Payer: MEDICARE

## 2019-08-01 ENCOUNTER — TELEPHONE (OUTPATIENT)
Dept: INTERNAL MEDICINE CLINIC | Age: 74
End: 2019-08-01

## 2019-08-01 VITALS
BODY MASS INDEX: 42.49 KG/M2 | OXYGEN SATURATION: 98 % | RESPIRATION RATE: 12 BRPM | TEMPERATURE: 97.7 F | DIASTOLIC BLOOD PRESSURE: 73 MMHG | HEIGHT: 65 IN | HEART RATE: 92 BPM | SYSTOLIC BLOOD PRESSURE: 111 MMHG | WEIGHT: 255 LBS

## 2019-08-01 DIAGNOSIS — L50.9 URTICARIA: Primary | ICD-10-CM

## 2019-08-01 DIAGNOSIS — R21 RASH AND OTHER NONSPECIFIC SKIN ERUPTION: ICD-10-CM

## 2019-08-01 DIAGNOSIS — L29.9 ITCHING: ICD-10-CM

## 2019-08-01 PROCEDURE — 6360000002 HC RX W HCPCS: Performed by: NURSE PRACTITIONER

## 2019-08-01 PROCEDURE — 6370000000 HC RX 637 (ALT 250 FOR IP): Performed by: NURSE PRACTITIONER

## 2019-08-01 PROCEDURE — 99282 EMERGENCY DEPT VISIT SF MDM: CPT

## 2019-08-01 PROCEDURE — 96372 THER/PROPH/DIAG INJ SC/IM: CPT

## 2019-08-01 RX ORDER — FAMOTIDINE 20 MG/1
40 TABLET, FILM COATED ORAL ONCE
Status: DISCONTINUED | OUTPATIENT
Start: 2019-08-01 | End: 2019-08-01

## 2019-08-01 RX ORDER — FAMOTIDINE 20 MG/1
40 TABLET, FILM COATED ORAL ONCE
Status: COMPLETED | OUTPATIENT
Start: 2019-08-01 | End: 2019-08-01

## 2019-08-01 RX ORDER — PREDNISONE 20 MG/1
60 TABLET ORAL ONCE
Status: DISCONTINUED | OUTPATIENT
Start: 2019-08-01 | End: 2019-08-01

## 2019-08-01 RX ORDER — DEXAMETHASONE SODIUM PHOSPHATE 4 MG/ML
10 INJECTION, SOLUTION INTRA-ARTICULAR; INTRALESIONAL; INTRAMUSCULAR; INTRAVENOUS; SOFT TISSUE ONCE
Status: COMPLETED | OUTPATIENT
Start: 2019-08-01 | End: 2019-08-01

## 2019-08-01 RX ORDER — PREDNISONE 10 MG/1
40 TABLET ORAL DAILY
Qty: 16 TABLET | Refills: 0 | Status: SHIPPED | OUTPATIENT
Start: 2019-08-01 | End: 2019-08-05

## 2019-08-01 RX ORDER — DEXAMETHASONE SODIUM PHOSPHATE 4 MG/ML
10 INJECTION, SOLUTION INTRA-ARTICULAR; INTRALESIONAL; INTRAMUSCULAR; INTRAVENOUS; SOFT TISSUE ONCE
Status: DISCONTINUED | OUTPATIENT
Start: 2019-08-01 | End: 2019-08-01

## 2019-08-01 RX ADMIN — FAMOTIDINE 40 MG: 20 TABLET ORAL at 12:50

## 2019-08-01 RX ADMIN — DEXAMETHASONE SODIUM PHOSPHATE 10 MG: 4 INJECTION, SOLUTION INTRAMUSCULAR; INTRAVENOUS at 12:57

## 2019-08-01 ASSESSMENT — PAIN DESCRIPTION - PROGRESSION: CLINICAL_PROGRESSION: NOT CHANGED

## 2019-08-01 ASSESSMENT — PAIN DESCRIPTION - LOCATION: LOCATION: GENERALIZED

## 2019-08-01 ASSESSMENT — PAIN DESCRIPTION - PAIN TYPE: TYPE: ACUTE PAIN

## 2019-08-01 ASSESSMENT — PAIN SCALES - GENERAL: PAINLEVEL_OUTOF10: 7

## 2019-08-01 ASSESSMENT — PAIN DESCRIPTION - ONSET: ONSET: ON-GOING

## 2019-08-01 ASSESSMENT — PAIN DESCRIPTION - DESCRIPTORS: DESCRIPTORS: ACHING

## 2019-08-01 ASSESSMENT — PAIN DESCRIPTION - FREQUENCY: FREQUENCY: CONTINUOUS

## 2019-08-01 NOTE — TELEPHONE ENCOUNTER
Patient has a rash. She seen Ruma Kishor on 7/22 for ear pain, and was prescribed prednisone. That following Friday she started with the rash. Patient went to urgent care and was given antibiotic. Patient has gotten no relief. Feels big welps in her scalp. On 7/29 she seen Dr. Anika Valladares from [facility] and was given hydroxine for itching and was told to get smith cream. She completed labs but has not gotten results. She is miserable. Please advise. Rash is all over her torso and lower extremeties.

## 2019-08-01 NOTE — TELEPHONE ENCOUNTER
Dr. Liu [place]. ER and  I think allergist. ? Your thoughts. She has Derm appt 8/12. She is itching and hives are spreading. Thanks.  Dinah Peter

## 2019-08-03 NOTE — ED PROVIDER NOTES
The patient arrived to the ED via private car. Nursing notes reviewed. Past Medical History:   Diagnosis Date    DJD (degenerative joint disease)     Hyperlipidemia     Hypertension     Hypothyroidism     Morbid obesity due to excess calories ([facility] 2016    Type II or unspecified type diabetes mellitus without mention of complication, not stated as uncontrolled      Past Surgical History:   Procedure Laterality Date    CHOLECYSTECTOMY      COLONOSCOPY      DILATION AND CURETTAGE OF UTERUS      GASTRIC BYPASS SURGERY      HERNIA REPAIR      HYSTERECTOMY, TOTAL ABDOMINAL      KNEE SURGERY      TUBAL LIGATION       History reviewed. No pertinent family history. Social History     Socioeconomic History    Marital status:       Spouse name: Not on file    Number of children: Not on file    Years of education: Not on file    Highest education level: Not on file   Occupational History    Not on file   Social Needs    Financial resource strain: Not on file    Food insecurity:     Worry: Not on file     Inability: Not on file    Transportation needs:     Medical: Not on file     Non-medical: Not on file   Tobacco Use    Smoking status: Former Smoker     Packs/day: 1.00     Years: 15.00     Pack years: 15.00     Types: Cigarettes     Last attempt to quit: 1978     Years since quittin.6    Smokeless tobacco: Never Used   Substance and Sexual Activity    Alcohol use: No    Drug use: No    Sexual activity: Not Currently   Lifestyle    Physical activity:     Days per week: Not on file     Minutes per session: Not on file    Stress: Not on file   Relationships    Social connections:     Talks on phone: Not on file     Gets together: Not on file     Attends Yarsanism service: Not on file     Active member of club or organization: Not on file     Attends meetings of clubs or organizations: Not on file     Relationship status: Not on file    Intimate partner violence: START taking these medications    Details   predniSONE (DELTASONE) 10 MG tablet Take 4 tablets by mouth daily for 4 days, Disp-16 tablet, R-0Print             FOLLOW UP  Debi Arnold MD  [address] [address]  [facility] [address]  [phone]    Schedule an appointment as soon as possible for a visit in 3 days  For further evaluation    [facility]  ED  [address] [facility] [address]  Go to   If symptoms worsen      DISPOSITION  Patient was discharged to home in good condition. Comment: Please note this report has been produced using speech recognition software and may contain errors related to that system including errors in grammar, punctuation, and spelling, as well as words and phrases that may be inappropriate. If there are any questions or concerns please feel free to contact the dictating provider for clarification.         BASSAM Davis - CNP  08/03/19 2799

## 2019-08-10 ENCOUNTER — HOSPITAL ENCOUNTER (EMERGENCY)
Age: 74
Discharge: HOME | End: 2019-08-10
Payer: MEDICARE

## 2019-08-10 VITALS
OXYGEN SATURATION: 94 % | RESPIRATION RATE: 20 BRPM | WEIGHT: 255 LBS | TEMPERATURE: 97.5 F | SYSTOLIC BLOOD PRESSURE: 132 MMHG | BODY MASS INDEX: 40.98 KG/M2 | DIASTOLIC BLOOD PRESSURE: 79 MMHG | HEIGHT: 66 IN | HEART RATE: 78 BPM

## 2019-08-10 DIAGNOSIS — L50.9 URTICARIA: Primary | ICD-10-CM

## 2019-08-10 PROCEDURE — 99282 EMERGENCY DEPT VISIT SF MDM: CPT

## 2019-08-10 PROCEDURE — 96372 THER/PROPH/DIAG INJ SC/IM: CPT

## 2019-08-10 PROCEDURE — 6360000002 HC RX W HCPCS: Performed by: PHYSICIAN ASSISTANT

## 2019-08-10 PROCEDURE — 6370000000 HC RX 637 (ALT 250 FOR IP): Performed by: PHYSICIAN ASSISTANT

## 2019-08-10 RX ORDER — METHYLPREDNISOLONE ACETATE 80 MG/ML
80 INJECTION, SUSPENSION INTRA-ARTICULAR; INTRALESIONAL; INTRAMUSCULAR; SOFT TISSUE ONCE
Status: COMPLETED | OUTPATIENT
Start: 2019-08-10 | End: 2019-08-10

## 2019-08-10 RX ORDER — FAMOTIDINE 20 MG/1
20 TABLET, FILM COATED ORAL ONCE
Status: COMPLETED | OUTPATIENT
Start: 2019-08-10 | End: 2019-08-10

## 2019-08-10 RX ADMIN — FAMOTIDINE 20 MG: 20 TABLET ORAL at 22:08

## 2019-08-10 RX ADMIN — METHYLPREDNISOLONE ACETATE 80 MG: 80 INJECTION, SUSPENSION INTRA-ARTICULAR; INTRALESIONAL; INTRAMUSCULAR; SOFT TISSUE at 22:29

## 2019-08-11 NOTE — ED PROVIDER NOTES
(115.7 kg)   SpO2 94%   BMI 41.16 kg/m²   CONSTITUTIONAL: Awake and alert. Well-developed. Well-nourished. Non-toxic. Cooperative. No acute distress. HENT: Normocephalic. Atraumatic. External ears normal, without discharge. Nose normal. Mucous membranes moist.  EYES: Conjunctiva non-injected. No scleral icterus. PERRL. EOM's grossly intact. NECK: Supple. Normal ROM. CARDIOVASCULAR: Normal heart rate. Intact distal pulses. PULMONARY/CHEST WALL: Breathing is unlabored. Equal, symmetric chest rise. Speaking comfortably in full sentences. ABDOMEN: Nondistended  MUSKULOSKELETAL: Normal ROM. No acute deformities. SKIN: Warm and dry. Urticaria type rash to the distal forearms, hands, face. NEUROLOGICAL: Alert and oriented x 3. Strength is 5/5 in all extremities and sensation is intact. PSYCHIATRIC: Normal affect    Labs:    NONE    RADIOLOGY:    NONE            ED COURSE/MDM:  Patient was given the following medications:  Medications   methylPREDNISolone acetate (DEPO-MEDROL) injection 80 mg (has no administration in time range)   famotidine (PEPCID) tablet 20 mg (20 mg Oral Given 8/10/19 2208)       I have evaluated this patient here in the ED. I have evaluated the patient in the ED. She is here with recurrent urticaria. She is not experiencing any systemic allergy symptoms or signs of impending airway compromise or anaphylaxis. She will be given a shot of Depo-Medrol in the ED and oral Pepcid. She is taking Vistaril at home and again Tagamet in the mornings. The patient is encouraged to keep her dermatology appointment on Monday. All questions answered prior to discharge. I estimate there is LOW risk for AIRWAY COMPROMISE, ANAPHYLAXIS, EPIGLOTTITIS, CELLULITIS or NECROTIZING FASCIITIS, thus I consider the discharge disposition reasonable. Also, there is no evidence or peritonitis, sepsis, or toxicity.  Isai López and I have discussed the diagnosis and risks, and we agree with discharging home

## 2019-08-27 DIAGNOSIS — E11.9 TYPE 2 DIABETES MELLITUS WITHOUT COMPLICATION (HCC): ICD-10-CM

## 2019-09-04 ENCOUNTER — TELEPHONE (OUTPATIENT)
Dept: INTERNAL MEDICINE CLINIC | Age: 74
End: 2019-09-04

## 2019-09-04 ENCOUNTER — OFFICE VISIT (OUTPATIENT)
Dept: INTERNAL MEDICINE CLINIC | Age: 74
End: 2019-09-04
Payer: MEDICARE

## 2019-09-04 VITALS
HEIGHT: 65 IN | OXYGEN SATURATION: 96 % | WEIGHT: 271.4 LBS | DIASTOLIC BLOOD PRESSURE: 76 MMHG | HEART RATE: 75 BPM | RESPIRATION RATE: 16 BRPM | SYSTOLIC BLOOD PRESSURE: 136 MMHG | BODY MASS INDEX: 45.22 KG/M2

## 2019-09-04 DIAGNOSIS — F33.0 MILD EPISODE OF RECURRENT MAJOR DEPRESSIVE DISORDER (HCC): ICD-10-CM

## 2019-09-04 DIAGNOSIS — L50.8 CHRONIC URTICARIA: Primary | ICD-10-CM

## 2019-09-04 PROCEDURE — 99214 OFFICE O/P EST MOD 30 MIN: CPT | Performed by: NURSE PRACTITIONER

## 2019-09-04 RX ORDER — BUPROPION HYDROCHLORIDE 150 MG/1
150 TABLET ORAL EVERY MORNING
Qty: 30 TABLET | Refills: 3 | Status: SHIPPED | OUTPATIENT
Start: 2019-09-04 | End: 2020-07-01

## 2019-09-04 ASSESSMENT — ENCOUNTER SYMPTOMS
SINUS PRESSURE: 0
SORE THROAT: 0
VOMITING: 0
NAUSEA: 0
DIARRHEA: 0
FACIAL SWELLING: 0
COUGH: 0

## 2019-09-04 NOTE — TELEPHONE ENCOUNTER
Spoke to scheduling with dr Temo Rivera office they are going to send him a message and see if he can squeeze her into his schedule. They will call and let us know.

## 2019-09-05 NOTE — PROGRESS NOTES
Subjective:      Patient ID: Cipriano Callejas is a 69 y.o. female. HPI  Chief Complaint   Patient presents with    Urticaria     lft hand, in her head and neck; has an appt with allergist     Pt is tearful today in office as she has been dealing with chronic urticaria for 2 months. She has seen multiple internal medicine providers, allergists and ED providers. She has been on Prednisone, Hydroxyzine and topical agents. She is less severe but continues to have new urticaria arise and tingling/swelling of lips/tongue intermittently. Less itching. She is overwhelmed by finances, stress with family and chronic urticaria. Allergist performed Rheumatoid factor +. Recommend f/u Rheum (appt Dr Shaniqua Mercado 11/22/19). Prior to Visit Medications    Medication Sig Taking? Authorizing Provider   Cetirizine HCl (ZYRTEC PO) Take by mouth Yes Historical Provider, MD   buPROPion (WELLBUTRIN XL) 150 MG extended release tablet Take 1 tablet by mouth every morning Yes Cong Shepherd APRN - CNP   blood glucose test strips (ACCU-CHEK LIBBY PLUS) strip USE ONE STRIP TO TEST DAILY Yes Dessie Curling, MD   clotrimazole-betamethasone (LOTRISONE) 1-0.05 % cream  Yes Historical Provider, MD   fluticasone (CUTIVATE) 0.005 % ointment Apply topically 2 times daily.  Yes BASSAM Kauffman - CNP   levothyroxine (SYNTHROID) 150 MCG tablet Take 1 tablet by mouth daily Yes Dinh Nino MD   lisinopril-hydrochlorothiazide (PRINZIDE;ZESTORETIC) 20-12.5 MG per tablet TAKE TWO TABLETS BY MOUTH DAILY Yes Dessie Curling, MD   metFORMIN (GLUCOPHAGE) 1000 MG tablet TAKE ONE TABLET BY MOUTH TWICE A DAY WITH MEALS Yes Dessie Curling, MD   simvastatin (ZOCOR) 20 MG tablet TAKE ONE TABLET BY MOUTH EVERY EVENING Yes BASSAM Moran - CNP   FLUoxetine (PROZAC) 40 MG capsule TAKE ONE CAPSULE BY MOUTH DAILY Yes Dinh Nino MD   glipiZIDE (GLIPIZIDE XL) 5 MG extended release tablet Take 2 tablets by mouth 2 times daily Yes BASSAM Abrams - reviewing recent health concerns. Vitals reviewed. Assessment:      1. Chronic urticaria  Reviewed records from previous providers, ED and allergist.   Continue Zyrtec 10mg TID and Pepcid BID. If any symptoms worsen, RTO or call (consider prednisone). Will try to move Rheumatology visit earlier [facility])    2. Mild episode of recurrent major depressive disorder (HCC)  Add Wellbutrin. Continue Prozac. Consider Buspar in future. Consider therapy in future with Jayne    - buPROPion (WELLBUTRIN XL) 150 MG extended release tablet; Take 1 tablet by mouth every morning  Dispense: 30 tablet; Refill: 3          Plan:      See above plan    Total visit time was 30 minutes, of which more than 50% of the time was spent discussing and counseling patient on depression, urticaria. Return in about 4 weeks (around 10/2/2019) for F/u new medicine, 30 min appt.               BASSAM Katz - CNP

## 2019-09-09 RX ORDER — HYDROXYZINE PAMOATE 25 MG/1
CAPSULE ORAL
Qty: 30 CAPSULE | Refills: 0 | Status: SHIPPED | OUTPATIENT
Start: 2019-09-09 | End: 2021-11-15

## 2019-09-20 RX ORDER — LEVOTHYROXINE SODIUM 0.15 MG/1
150 TABLET ORAL DAILY
Qty: 90 TABLET | Refills: 0 | Status: SHIPPED | OUTPATIENT
Start: 2019-09-20 | End: 2020-01-05

## 2019-10-11 ENCOUNTER — OFFICE VISIT (OUTPATIENT)
Dept: INTERNAL MEDICINE CLINIC | Age: 74
End: 2019-10-11
Payer: MEDICARE

## 2019-10-11 VITALS
WEIGHT: 260 LBS | BODY MASS INDEX: 43.27 KG/M2 | HEART RATE: 76 BPM | SYSTOLIC BLOOD PRESSURE: 112 MMHG | OXYGEN SATURATION: 99 % | DIASTOLIC BLOOD PRESSURE: 62 MMHG

## 2019-10-11 DIAGNOSIS — E11.9 TYPE 2 DIABETES MELLITUS WITHOUT COMPLICATION, WITHOUT LONG-TERM CURRENT USE OF INSULIN (HCC): ICD-10-CM

## 2019-10-11 DIAGNOSIS — E03.9 ACQUIRED HYPOTHYROIDISM: ICD-10-CM

## 2019-10-11 DIAGNOSIS — L50.8 CHRONIC URTICARIA: Primary | ICD-10-CM

## 2019-10-11 DIAGNOSIS — E78.2 MIXED HYPERLIPIDEMIA: ICD-10-CM

## 2019-10-11 DIAGNOSIS — Z23 NEED FOR INFLUENZA VACCINATION: ICD-10-CM

## 2019-10-11 DIAGNOSIS — F33.0 MILD EPISODE OF RECURRENT MAJOR DEPRESSIVE DISORDER (HCC): ICD-10-CM

## 2019-10-11 PROCEDURE — 90653 IIV ADJUVANT VACCINE IM: CPT | Performed by: NURSE PRACTITIONER

## 2019-10-11 PROCEDURE — G0008 ADMIN INFLUENZA VIRUS VAC: HCPCS | Performed by: NURSE PRACTITIONER

## 2019-10-11 PROCEDURE — 99214 OFFICE O/P EST MOD 30 MIN: CPT | Performed by: NURSE PRACTITIONER

## 2019-10-11 ASSESSMENT — ENCOUNTER SYMPTOMS
DIARRHEA: 0
NAUSEA: 0
SINUS PRESSURE: 0
SORE THROAT: 0
COUGH: 0
VOMITING: 0
FACIAL SWELLING: 0

## 2019-12-20 ENCOUNTER — OFFICE VISIT (OUTPATIENT)
Dept: INTERNAL MEDICINE CLINIC | Age: 74
End: 2019-12-20
Payer: MEDICARE

## 2019-12-20 ENCOUNTER — TELEPHONE (OUTPATIENT)
Dept: INTERNAL MEDICINE CLINIC | Age: 74
End: 2019-12-20

## 2019-12-20 ENCOUNTER — HOSPITAL ENCOUNTER (OUTPATIENT)
Age: 74
Discharge: HOME | End: 2019-12-20
Payer: MEDICARE

## 2019-12-20 VITALS
DIASTOLIC BLOOD PRESSURE: 66 MMHG | WEIGHT: 264 LBS | OXYGEN SATURATION: 98 % | BODY MASS INDEX: 43.93 KG/M2 | SYSTOLIC BLOOD PRESSURE: 134 MMHG | HEART RATE: 78 BPM

## 2019-12-20 DIAGNOSIS — E78.2 MIXED HYPERLIPIDEMIA: ICD-10-CM

## 2019-12-20 DIAGNOSIS — L50.8 CHRONIC URTICARIA: ICD-10-CM

## 2019-12-20 DIAGNOSIS — R53.1 WEAKNESS: ICD-10-CM

## 2019-12-20 DIAGNOSIS — F32.5 MAJOR DEPRESSION IN REMISSION (HCC): ICD-10-CM

## 2019-12-20 DIAGNOSIS — R73.9 HYPERGLYCEMIA: ICD-10-CM

## 2019-12-20 DIAGNOSIS — R29.6 FREQUENT FALLS: Primary | ICD-10-CM

## 2019-12-20 DIAGNOSIS — E11.9 TYPE 2 DIABETES MELLITUS WITHOUT COMPLICATION, WITHOUT LONG-TERM CURRENT USE OF INSULIN (HCC): ICD-10-CM

## 2019-12-20 DIAGNOSIS — E03.9 ACQUIRED HYPOTHYROIDISM: ICD-10-CM

## 2019-12-20 LAB
A/G RATIO: 1.2 (ref 1.1–2.2)
ALBUMIN SERPL-MCNC: 3.7 G/DL (ref 3.4–5)
ALP BLD-CCNC: 67 U/L (ref 40–129)
ALT SERPL-CCNC: 13 U/L (ref 10–40)
ANION GAP SERPL CALCULATED.3IONS-SCNC: 12 MMOL/L (ref 3–16)
AST SERPL-CCNC: 16 U/L (ref 15–37)
BILIRUB SERPL-MCNC: 0.4 MG/DL (ref 0–1)
BUN BLDV-MCNC: 14 MG/DL (ref 7–20)
CALCIUM SERPL-MCNC: 9.2 MG/DL (ref 8.3–10.6)
CHLORIDE BLD-SCNC: 104 MMOL/L (ref 99–110)
CHOLESTEROL, TOTAL: 154 MG/DL (ref 0–199)
CO2: 27 MMOL/L (ref 21–32)
CREAT SERPL-MCNC: <0.5 MG/DL (ref 0.6–1.2)
GFR AFRICAN AMERICAN: >60
GFR NON-AFRICAN AMERICAN: >60
GLOBULIN: 3.1 G/DL
GLUCOSE BLD-MCNC: 134 MG/DL (ref 70–99)
HDLC SERPL-MCNC: 81 MG/DL (ref 40–60)
LDL CHOLESTEROL CALCULATED: 55 MG/DL
POTASSIUM SERPL-SCNC: 4.1 MMOL/L (ref 3.5–5.1)
SODIUM BLD-SCNC: 143 MMOL/L (ref 136–145)
TOTAL PROTEIN: 6.8 G/DL (ref 6.4–8.2)
TRIGL SERPL-MCNC: 88 MG/DL (ref 0–150)
TSH REFLEX: 1.07 UIU/ML (ref 0.27–4.2)
VLDLC SERPL CALC-MCNC: 18 MG/DL

## 2019-12-20 PROCEDURE — 80061 LIPID PANEL: CPT

## 2019-12-20 PROCEDURE — 99213 OFFICE O/P EST LOW 20 MIN: CPT | Performed by: NURSE PRACTITIONER

## 2019-12-20 PROCEDURE — 84443 ASSAY THYROID STIM HORMONE: CPT

## 2019-12-20 PROCEDURE — 36415 COLL VENOUS BLD VENIPUNCTURE: CPT

## 2019-12-20 PROCEDURE — 83036 HEMOGLOBIN GLYCOSYLATED A1C: CPT

## 2019-12-20 PROCEDURE — 80053 COMPREHEN METABOLIC PANEL: CPT

## 2019-12-20 ASSESSMENT — ENCOUNTER SYMPTOMS
VOMITING: 0
FACIAL SWELLING: 0
NAUSEA: 0
SORE THROAT: 0
COUGH: 0
SINUS PRESSURE: 0
DIARRHEA: 0

## 2019-12-21 LAB
ESTIMATED AVERAGE GLUCOSE: 159.9 MG/DL
HBA1C MFR BLD: 7.2 %

## 2020-01-05 RX ORDER — LISINOPRIL AND HYDROCHLOROTHIAZIDE 20; 12.5 MG/1; MG/1
TABLET ORAL
Qty: 180 TABLET | Refills: 0 | Status: SHIPPED | OUTPATIENT
Start: 2020-01-05 | End: 2020-04-04

## 2020-01-05 RX ORDER — LEVOTHYROXINE SODIUM 0.15 MG/1
TABLET ORAL
Qty: 90 TABLET | Refills: 0 | Status: SHIPPED | OUTPATIENT
Start: 2020-01-05 | End: 2020-04-04

## 2020-01-24 ENCOUNTER — TELEPHONE (OUTPATIENT)
Dept: INTERNAL MEDICINE CLINIC | Age: 75
End: 2020-01-24

## 2020-01-24 ENCOUNTER — PATIENT MESSAGE (OUTPATIENT)
Dept: INTERNAL MEDICINE CLINIC | Age: 75
End: 2020-01-24

## 2020-02-05 NOTE — TELEPHONE ENCOUNTER
Ozempic samples received. How do you want her taking this so I can put in the order.    1.5mL pen can deliver 0.25 or 0.5

## 2020-02-26 RX ORDER — AMLODIPINE BESYLATE 10 MG/1
TABLET ORAL
Qty: 90 TABLET | Refills: 3 | Status: SHIPPED | OUTPATIENT
Start: 2020-02-26 | End: 2021-02-01

## 2020-02-26 RX ORDER — FLUOXETINE HYDROCHLORIDE 40 MG/1
40 CAPSULE ORAL DAILY
Qty: 90 CAPSULE | Refills: 3 | Status: SHIPPED | OUTPATIENT
Start: 2020-02-26 | End: 2021-04-01

## 2020-02-26 NOTE — TELEPHONE ENCOUNTER
Refill request for amlodipine prozac medication.      Name of Pharmacy- [facility]    Last visit - 12-20-19     Pending visit - 4-20-20    Last refill -3-22-19    Medication Contract signed -   Herve kathleen-     Additional Comments

## 2020-04-04 RX ORDER — LISINOPRIL AND HYDROCHLOROTHIAZIDE 20; 12.5 MG/1; MG/1
TABLET ORAL
Qty: 180 TABLET | Refills: 0 | Status: SHIPPED | OUTPATIENT
Start: 2020-04-04 | End: 2020-07-22

## 2020-04-04 RX ORDER — LEVOTHYROXINE SODIUM 0.15 MG/1
TABLET ORAL
Qty: 90 TABLET | Refills: 0 | Status: SHIPPED | OUTPATIENT
Start: 2020-04-04 | End: 2020-07-22

## 2020-04-04 RX ORDER — SIMVASTATIN 20 MG
TABLET ORAL
Qty: 90 TABLET | Refills: 1 | Status: SHIPPED | OUTPATIENT
Start: 2020-04-04 | End: 2020-10-28

## 2020-04-20 ENCOUNTER — TELEMEDICINE (OUTPATIENT)
Dept: INTERNAL MEDICINE CLINIC | Age: 75
End: 2020-04-20
Payer: MEDICARE

## 2020-04-20 PROCEDURE — G0438 PPPS, INITIAL VISIT: HCPCS | Performed by: NURSE PRACTITIONER

## 2020-04-20 ASSESSMENT — LIFESTYLE VARIABLES
HOW OFTEN DO YOU HAVE SIX OR MORE DRINKS ON ONE OCCASION: 0
HOW OFTEN DURING THE LAST YEAR HAVE YOU FOUND THAT YOU WERE NOT ABLE TO STOP DRINKING ONCE YOU HAD STARTED: 0
AUDIT-C TOTAL SCORE: 4
HOW OFTEN DO YOU HAVE A DRINK CONTAINING ALCOHOL: 4
HOW MANY STANDARD DRINKS CONTAINING ALCOHOL DO YOU HAVE ON A TYPICAL DAY: 0
HOW OFTEN DURING THE LAST YEAR HAVE YOU HAD A FEELING OF GUILT OR REMORSE AFTER DRINKING: 0
HOW OFTEN DURING THE LAST YEAR HAVE YOU BEEN UNABLE TO REMEMBER WHAT HAPPENED THE NIGHT BEFORE BECAUSE YOU HAD BEEN DRINKING: 0
HOW OFTEN DURING THE LAST YEAR HAVE YOU FAILED TO DO WHAT WAS NORMALLY EXPECTED FROM YOU BECAUSE OF DRINKING: 0
HAVE YOU OR SOMEONE ELSE BEEN INJURED AS A RESULT OF YOUR DRINKING: 0
HAS A RELATIVE, FRIEND, DOCTOR, OR ANOTHER HEALTH PROFESSIONAL EXPRESSED CONCERN ABOUT YOUR DRINKING OR SUGGESTED YOU CUT DOWN: 0
HOW OFTEN DURING THE LAST YEAR HAVE YOU NEEDED AN ALCOHOLIC DRINK FIRST THING IN THE MORNING TO GET YOURSELF GOING AFTER A NIGHT OF HEAVY DRINKING: 0
AUDIT TOTAL SCORE: 4

## 2020-04-20 ASSESSMENT — PATIENT HEALTH QUESTIONNAIRE - PHQ9
SUM OF ALL RESPONSES TO PHQ QUESTIONS 1-9: 0
1. LITTLE INTEREST OR PLEASURE IN DOING THINGS: 0
SUM OF ALL RESPONSES TO PHQ QUESTIONS 1-9: 0
2. FEELING DOWN, DEPRESSED OR HOPELESS: 0
SUM OF ALL RESPONSES TO PHQ9 QUESTIONS 1 & 2: 0

## 2020-04-20 NOTE — PROGRESS NOTES
Medicare Annual Wellness Visit  Name: Narinder Joseph Date: 2020   MRN: <[id number]> Sex: Female   Age: 69 y.o. Ethnicity: Non-/Non    : 1945 Race: Ashley Landeros is here for Medicare AWV    Screenings for behavioral, psychosocial and functional/safety risks, and cognitive dysfunction are all negative except as indicated below. These results, as well as other patient data from the [address] form, are documented in Flowsheets linked to this Encounter. No Known Allergies    Prior to Visit Medications    Medication Sig Taking? Authorizing Provider   lisinopril-hydroCHLOROthiazide (PRINZIDE;ZESTORETIC) 20-12.5 MG per tablet TAKE TWO TABLETS BY MOUTH DAILY Yes Usama Weems MD   simvastatin (ZOCOR) 20 MG tablet TAKE ONE TABLET BY MOUTH EVERY EVENING Yes Usama Weems MD   levothyroxine (SYNTHROID) 150 MCG tablet TAKE ONE TABLET BY MOUTH DAILY Yes Usama Weems MD   amLODIPine (NORVASC) 10 MG tablet TAKE ONE TABLET BY MOUTH DAILY Yes Usama Weems MD   FLUoxetine (PROZAC) 40 MG capsule Take 1 capsule by mouth daily Yes Usama Weems MD   Semaglutide,0.25 or 0.5MG/DOS, (OZEMPIC, 0.25 OR 0.5 MG/DOSE,) 2 MG/1.5ML SOPN Sample   LOT: [id number]  Exp: 2022  NDC: 3533-7165-19 Yes BASSAM Tilley CNP   hydrOXYzine (VISTARIL) 25 MG capsule TAKE ONE CAPSULE BY MOUTH THREE TIMES A DAY AS NEEDED FOR ITCHING Yes BASSAM Barnhart CNP   Cetirizine HCl (ZYRTEC PO) Take by mouth Yes Historical Provider, MD   buPROPion (WELLBUTRIN XL) 150 MG extended release tablet Take 1 tablet by mouth every morning Yes BASSAM Orr CNP   blood glucose test strips (ACCU-CHEK LIBBY PLUS) strip USE ONE STRIP TO TEST DAILY Yes Usama Weems MD   clotrimazole-betamethasone (LOTRISONE) 1-0.05 % cream  Yes Historical Provider, MD   fluticasone (CUTIVATE) 0.005 % ointment Apply topically 2 times daily.  Yes [place] Serve, APRN - CNP   metFORMIN (GLUCOPHAGE) 1000 MG

## 2020-04-20 NOTE — PATIENT INSTRUCTIONS
Try Sit and Fit videos daily and walking outside every day. Once you complete Ozempic and Victoza stop these medications and continue on Glipizide. Contact the office with any concerns related to your blood glucose levels. Continue checking your blood glucose daily. Try to check your blood pressure 1-3x a week. Try to schedule a dentist appointment for this year and complete your Living Will. Personalized Preventive Plan for Erik Garrison - 4/20/2020  Medicare offers a range of preventive health benefits. Some of the tests and screenings are paid in full while other may be subject to a deductible, co-insurance, and/or copay. Some of these benefits include a comprehensive review of your medical history including lifestyle, illnesses that may run in your family, and various assessments and screenings as appropriate. After reviewing your medical record and screening and assessments performed today your provider may have ordered immunizations, labs, imaging, and/or referrals for you. A list of these orders (if applicable) as well as your Preventive Care list are included within your After Visit Summary for your review. Other Preventive Recommendations:    · A preventive eye exam performed by an eye specialist is recommended every 1-2 years to screen for glaucoma; cataracts, macular degeneration, and other eye disorders. · A preventive dental visit is recommended every 6 months. · Try to get at least 150 minutes of exercise per week or 10,000 steps per day on a pedometer . · Order or download the FREE \"Exercise & Physical Activity: Your Everyday Guide\" from [employer]. Call 2-615.722.5522 or search [employer] online. · You need 9337-4384 mg of calcium and 8488-0898 IU of vitamin D per day.  It is possible to meet your calcium requirement with diet alone, but a vitamin D supplement is usually necessary to meet this goal.  · When exposed to the sun, use a

## 2020-05-03 RX ORDER — GLIPIZIDE 5 MG/1
TABLET, FILM COATED, EXTENDED RELEASE ORAL
Qty: 360 TABLET | Refills: 2 | Status: SHIPPED | OUTPATIENT
Start: 2020-05-03 | End: 2021-08-31 | Stop reason: SDUPTHER

## 2020-06-25 ENCOUNTER — HOSPITAL ENCOUNTER (OUTPATIENT)
Dept: WOMENS IMAGING | Age: 75
Discharge: HOME | End: 2020-06-25
Payer: MEDICARE

## 2020-06-25 PROCEDURE — 77063 BREAST TOMOSYNTHESIS BI: CPT

## 2020-06-26 RX ORDER — LIRAGLUTIDE 6 MG/ML
INJECTION SUBCUTANEOUS
Qty: 8 PEN | Refills: 3 | Status: CANCELLED | OUTPATIENT
Start: 2020-06-26

## 2020-06-26 NOTE — TELEPHONE ENCOUNTER
Refill request for Victoza medication.      Name of Mounika Guzmán    Last visit - 12/20/2019       Pending visit -   Future Appointments   Date Time Provider Majo Christopher   7/13/2020  8:15 AM MD AMAN Gerard [facility]   4/21/2021  9:15 AM BASSAM Ybarra - CNP [facility]         Last refill -1/9/19    Medication Contract signed -   Last Oarrs ran-     Additional Comments

## 2020-06-27 RX ORDER — LIRAGLUTIDE 6 MG/ML
INJECTION SUBCUTANEOUS
Qty: 8 PEN | Refills: 3 | Status: SHIPPED | OUTPATIENT
Start: 2020-06-27

## 2020-07-01 ENCOUNTER — TELEMEDICINE (OUTPATIENT)
Dept: INTERNAL MEDICINE CLINIC | Age: 75
End: 2020-07-01
Payer: MEDICARE

## 2020-07-01 PROCEDURE — 99213 OFFICE O/P EST LOW 20 MIN: CPT | Performed by: INTERNAL MEDICINE

## 2020-07-01 RX ORDER — METHYLPREDNISOLONE 4 MG/1
TABLET ORAL
Qty: 1 KIT | Refills: 0 | Status: SHIPPED | OUTPATIENT
Start: 2020-07-01 | End: 2020-07-07

## 2020-07-01 ASSESSMENT — PATIENT HEALTH QUESTIONNAIRE - PHQ9
2. FEELING DOWN, DEPRESSED OR HOPELESS: 1
1. LITTLE INTEREST OR PLEASURE IN DOING THINGS: 0
SUM OF ALL RESPONSES TO PHQ QUESTIONS 1-9: 1
SUM OF ALL RESPONSES TO PHQ QUESTIONS 1-9: 1
SUM OF ALL RESPONSES TO PHQ9 QUESTIONS 1 & 2: 1

## 2020-07-01 ASSESSMENT — ENCOUNTER SYMPTOMS
DIARRHEA: 0
NAUSEA: 0
CHEST TIGHTNESS: 0
BACK PAIN: 0
CONSTIPATION: 0
ROS SKIN COMMENTS: +ITCHING
SHORTNESS OF BREATH: 0
COUGH: 0
WHEEZING: 0
VOMITING: 0
ABDOMINAL DISTENTION: 0

## 2020-07-01 NOTE — PROGRESS NOTES
2020    TELEHEALTH EVALUATION -- Audio/Visual (During WXHFS-77 public health emergency)    HPI:    Marcos Jacques (:  1945) has requested an audio/video evaluation for the following concern(s):    Pt with a h/o idiopathic urticaria. Most recent episode started 2-3 weeks ago. Urticaria is diffuse. Describes itching as severs. Taking zyrtec 4 tablets daily, pepcid, and hydroxyzine with minimal relief in sxs. Worried something is wrong with her kidneys which she researched on the internet. Review of Systems   Constitutional: Negative for unexpected weight change. Respiratory: Negative for cough, chest tightness, shortness of breath and wheezing. Cardiovascular: Negative for chest pain, palpitations and leg swelling. Gastrointestinal: Negative for abdominal distention, constipation, diarrhea, nausea and vomiting. Musculoskeletal: Negative for arthralgias, back pain and myalgias. Skin: Positive for rash. +itching   Neurological: Negative for tremors and numbness. All other systems reviewed and are negative. Prior to Visit Medications    Medication Sig Taking? Authorizing Provider   methylPREDNISolone (MEDROL DOSEPACK) 4 MG tablet Take by mouth.  Yes Sherice Bridges MD   Liraglutide (VICTOZA) 18 MG/3ML SOPN SC injection DIAL AND INJECT SUBCUTANEOUSLY 1.2 MG DAILY Yes Sherice Bridges MD   metFORMIN (GLUCOPHAGE) 1000 MG tablet TAKE ONE TABLET BY MOUTH TWICE A DAY WITH MEALS Yes BASSAM Shah - CNP   glipiZIDE (GLUCOTROL XL) 5 MG extended release tablet TAKE TWO TABLETS BY MOUTH TWICE A DAY Yes Sherice Bridges MD   lisinopril-hydroCHLOROthiazide (PRINZIDE;ZESTORETIC) 20-12.5 MG per tablet Jason Mcbride Yes Sherice Bridges MD   simvastatin (ZOCOR) 20 MG tablet TAKE ONE TABLET BY MOUTH EVERY EVENING Yes Sherice Bridges MD   levothyroxine (SYNTHROID) 150 MCG tablet TAKE ONE TABLET BY MOUTH DAILY Yes Sherice Bridges MD   amLODIPine (NORVASC) 10 MG tablet TAKE ONE TABLET BY MOUTH DAILY Yes Zi Butler MD   FLUoxetine (PROZAC) 40 MG capsule Take 1 capsule by mouth daily Yes Zi Butler MD   hydrOXYzine (VISTARIL) 25 MG capsule TAKE ONE CAPSULE BY MOUTH THREE TIMES A DAY AS NEEDED FOR ITCHING Yes BASSAM Ortiz CNP   Cetirizine HCl (ZYRTEC PO) Take by mouth Yes Historical Provider, MD   blood glucose test strips (ACCU-CHEK LIBBY PLUS) strip USE ONE STRIP TO TEST DAILY Yes Zi Butler MD   clotrimazole-betamethasone (Agapito Millers) 1-0.05 % cream  Yes Historical Provider, MD   Insulin Pen Needle (PEN NEEDLES) 32G X 4 MM MISC USE ONCE DAILY FOR Terrilee Dilling Yes Lynsey Garay MD   Blood Glucose Monitoring Suppl (ACURA BLOOD GLUCOSE METER) W/DEVICE KIT Provide pt with glucometer under forumlary - ACCU-CHEK, Dx Type 2 DM, testing daily Yes BASSAM Vidal CNP   Lancets MISC 1 po daily, please provide insurance formulary brand - ACCU-CHEK, Dx Type 2 DM, testing daily Yes BASSAM Vidal CNP   Vitamin D (CHOLECALCIFEROL) 1000 UNITS CAPS capsule Take 1,000 Units by mouth daily. Yes Historical Provider, MD   calcium-vitamin D (OSCAL-500) 500-200 MG-UNIT per tablet Take 2 tablets by mouth daily. Yes Historical Provider, MD   aspirin 81 MG tablet Take 81 mg by mouth daily.  Yes Historical Provider, MD       Social History     Tobacco Use    Smoking status: Former Smoker     Packs/day: 1.00     Years: 15.00     Pack years: 15.00     Types: Cigarettes     Last attempt to quit: 1978     Years since quittin.5    Smokeless tobacco: Never Used   Substance Use Topics    Alcohol use: No    Drug use: No      PHYSICAL EXAMINATION:  [ INSTRUCTIONS:  \"[x]\" Indicates a positive item  \"[]\" Indicates a negative item  -- DELETE ALL ITEMS NOT EXAMINED]  Vital Signs: (As obtained by patient/caregiver or practitioner observation)    Constitutional: [x] Appears well-developed and well-nourished [x] No apparent distress      [] Abnormal-   Mental status  [x] Alert and awake  [x] Oriented to person/place/time [x]Able to follow commands      Eyes:  EOM    [x]  Normal  [] Abnormal-  Sclera  [x]  Normal  [] Abnormal -         Discharge [x]  None visible  [] Abnormal -    HENT:   [x] Normocephalic, atraumatic. [] Abnormal   [x] Mouth/Throat: Mucous membranes are moist.     External Ears [x] Normal  [] Abnormal-     Neck: [x] No visualized mass     Pulmonary/Chest: [x] Respiratory effort normal.  [x] No visualized signs of difficulty breathing or respiratory distress        [] Abnormal-      Musculoskeletal:   [] Normal gait with no signs of ataxia         [x] Normal range of motion of neck        [] Abnormal-       Neurological:        [x] No Facial Asymmetry (Cranial nerve 7 motor function) (limited exam to video visit)          [x] No gaze palsy        [] Abnormal-         Skin:        [x] No significant exanthematous lesions or discoloration noted on facial skin         [] Abnormal-            Psychiatric:       [x] Normal Affect [x] No Hallucinations        [] Abnormal-     Other pertinent observable physical exam findings-     ASSESSMENT/PLAN:  1. Urticaria  Recurrent. Followed by allergist. Recommend medrol dose pack. Discussed use, benefit, and side effects of prescribed medications. Barriers to compliance discussed. All patient questions answered. Pt voiced understanding. Call if no improvement or worsening symptoms  - Basic Metabolic Panel; Future  - methylPREDNISolone (MEDROL DOSEPACK) 4 MG tablet; Take by mouth. Dispense: 1 kit; Refill: 0      No follow-ups on file. Fifi Estrella is a 69 y.o. female being evaluated by a Virtual Visit (video visit) encounter to address concerns as mentioned above. A caregiver was present when appropriate. Due to this being a TeleHealth encounter (During YFFUM-15 public health emergency), evaluation of the following organ systems was limited: Vitals/Constitutional/EENT/Resp/CV/GI//MS/Neuro/Skin/Heme-Lymph-Imm.   Pursuant to the emergency declaration under the [facility], [address] [place] [address] authority and the [employer] Act, this Virtual Visit was conducted with patient's (and/or legal guardian's) consent, to reduce the patient's risk of exposure to COVID-19 and provide necessary medical care. The patient (and/or legal guardian) has also been advised to contact this office for worsening conditions or problems, and seek emergency medical treatment and/or call 911 if deemed necessary. Patient identification was verified at the start of the visit: Yes    Total time spent on this encounter: 15 minutes    Services were provided through a video synchronous discussion virtually to substitute for in-person clinic visit. Patient and provider were located at their individual homes. --Denise Aleman MD on 7/1/2020 at 1:40 PM    An electronic signature was used to authenticate this note.

## 2020-07-02 ENCOUNTER — HOSPITAL ENCOUNTER (OUTPATIENT)
Age: 75
Discharge: HOME | End: 2020-07-02
Payer: MEDICARE

## 2020-07-02 LAB
ANION GAP SERPL CALCULATED.3IONS-SCNC: 10 MMOL/L (ref 3–16)
BUN BLDV-MCNC: 12 MG/DL (ref 7–20)
CALCIUM SERPL-MCNC: 9.2 MG/DL (ref 8.3–10.6)
CHLORIDE BLD-SCNC: 99 MMOL/L (ref 99–110)
CO2: 25 MMOL/L (ref 21–32)
CREAT SERPL-MCNC: 0.6 MG/DL (ref 0.6–1.2)
GFR AFRICAN AMERICAN: >60
GFR NON-AFRICAN AMERICAN: >60
GLUCOSE BLD-MCNC: 194 MG/DL (ref 70–99)
POTASSIUM SERPL-SCNC: 3.6 MMOL/L (ref 3.5–5.1)
SODIUM BLD-SCNC: 134 MMOL/L (ref 136–145)

## 2020-07-02 PROCEDURE — 36415 COLL VENOUS BLD VENIPUNCTURE: CPT

## 2020-07-02 PROCEDURE — 80048 BASIC METABOLIC PNL TOTAL CA: CPT

## 2020-07-13 ENCOUNTER — OFFICE VISIT (OUTPATIENT)
Dept: INTERNAL MEDICINE CLINIC | Age: 75
End: 2020-07-13
Payer: MEDICARE

## 2020-07-13 VITALS
OXYGEN SATURATION: 96 % | BODY MASS INDEX: 44.76 KG/M2 | TEMPERATURE: 97.2 F | SYSTOLIC BLOOD PRESSURE: 120 MMHG | WEIGHT: 269 LBS | DIASTOLIC BLOOD PRESSURE: 86 MMHG | HEART RATE: 89 BPM

## 2020-07-13 LAB
CREATININE URINE POCT: ABNORMAL
MICROALBUMIN/CREAT 24H UR: 50 MG/G{CREAT}
MICROALBUMIN/CREAT UR-RTO: ABNORMAL

## 2020-07-13 PROCEDURE — 82044 UR ALBUMIN SEMIQUANTITATIVE: CPT | Performed by: INTERNAL MEDICINE

## 2020-07-13 PROCEDURE — 99214 OFFICE O/P EST MOD 30 MIN: CPT | Performed by: INTERNAL MEDICINE

## 2020-07-13 RX ORDER — BUSPIRONE HYDROCHLORIDE 5 MG/1
5 TABLET ORAL 2 TIMES DAILY PRN
Qty: 60 TABLET | Refills: 0 | Status: SHIPPED | OUTPATIENT
Start: 2020-07-13 | End: 2020-08-11

## 2020-07-13 ASSESSMENT — ENCOUNTER SYMPTOMS
CHEST TIGHTNESS: 0
SHORTNESS OF BREATH: 0
CONSTIPATION: 0
BACK PAIN: 0
DIARRHEA: 0
COUGH: 0
ABDOMINAL DISTENTION: 0
VOMITING: 0
WHEEZING: 0
NAUSEA: 0

## 2020-07-13 NOTE — PROGRESS NOTES
Patient presents with         7/13/20    Rohit Wells  1945      Chief Complaint   Patient presents with    Hypertension    Diabetes    Hyperlipidemia       HPI    Here for follow-up hypertension, diabetes, hyperlipidemia and hypothyroid. Complains of worsening anxiety since she started breaking out in hives. States she feels jumpy inside constantly. Denies chest pain, shortness of breath, PND orthopnea. Fasting blood sugars have been in the 130s. Hemoglobin A1c 7.2 (12/20/2019). Patient currently  On Victoza, metformin 1000 mg, glipizide 5 mg. Patient is also on an ACE inhibitor and simvastatin    Lipids have improved with simvastatin 20 mg. LDL 55, HDL 81, triglycerides 88 (12/20/2019). Takes levothyroxine appropriately in the morning. TSH 1.07    Review of Systems   Constitutional: Negative for unexpected weight change. Respiratory: Negative for cough, chest tightness, shortness of breath and wheezing. Cardiovascular: Negative for chest pain, palpitations and leg swelling. Gastrointestinal: Negative for abdominal distention, constipation, diarrhea, nausea and vomiting. Musculoskeletal: Negative for arthralgias, back pain and myalgias. Neurological: Negative for tremors and numbness. Psychiatric/Behavioral: The patient is nervous/anxious. The patient is not hyperactive. All other systems reviewed and are negative.       Current Outpatient Medications   Medication Sig Dispense Refill    busPIRone (BUSPAR) 5 MG tablet Take 1 tablet by mouth 2 times daily as needed (anxiety) 60 tablet 0    Liraglutide (VICTOZA) 18 MG/3ML SOPN SC injection DIAL AND INJECT SUBCUTANEOUSLY 1.2 MG DAILY 8 pen 3    metFORMIN (GLUCOPHAGE) 1000 MG tablet TAKE ONE TABLET BY MOUTH TWICE A DAY WITH MEALS 180 tablet 2    glipiZIDE (GLUCOTROL XL) 5 MG extended release tablet TAKE TWO TABLETS BY MOUTH TWICE A  tablet 2    lisinopril-hydroCHLOROthiazide (PRINZIDE;ZESTORETIC) 20-12.5 MG per tablet TAKE TWO TABLETS BY MOUTH DAILY 180 tablet 0    simvastatin (ZOCOR) 20 MG tablet TAKE ONE TABLET BY MOUTH EVERY EVENING 90 tablet 1    levothyroxine (SYNTHROID) 150 MCG tablet TAKE ONE TABLET BY MOUTH DAILY 90 tablet 0    amLODIPine (NORVASC) 10 MG tablet TAKE ONE TABLET BY MOUTH DAILY 90 tablet 3    FLUoxetine (PROZAC) 40 MG capsule Take 1 capsule by mouth daily 90 capsule 3    hydrOXYzine (VISTARIL) 25 MG capsule TAKE ONE CAPSULE BY MOUTH THREE TIMES A DAY AS NEEDED FOR ITCHING 30 capsule 0    Cetirizine HCl (ZYRTEC PO) Take by mouth      blood glucose test strips (ACCU-CHEK LIBBY PLUS) strip USE ONE STRIP TO TEST DAILY 100 strip 3    clotrimazole-betamethasone (LOTRISONE) 1-0.05 % cream       Insulin Pen Needle (PEN NEEDLES) 32G X 4 MM MISC USE ONCE DAILY FOR VICTOZA 100 each 2    Blood Glucose Monitoring Suppl (ACURA BLOOD GLUCOSE METER) W/DEVICE KIT Provide pt with glucometer under forumlary - ACCU-CHEK, Dx Type 2 DM, testing daily 1 kit 0    Lancets MISC 1 po daily, please provide insurance formulary brand - ACCU-CHEK, Dx Type 2 DM, testing daily 100 each 3    Vitamin D (CHOLECALCIFEROL) 1000 UNITS CAPS capsule Take 1,000 Units by mouth daily.  calcium-vitamin D (OSCAL-500) 500-200 MG-UNIT per tablet Take 2 tablets by mouth daily.  aspirin 81 MG tablet Take 81 mg by mouth daily. No current facility-administered medications for this visit. Physical Exam  Constitutional:       General: She is not in acute distress. Appearance: She is well-developed. She is not diaphoretic. HENT:      Right Ear: External ear normal.      Left Ear: External ear normal.      Mouth/Throat:      Pharynx: No oropharyngeal exudate. Neck:      Musculoskeletal: Neck supple. Thyroid: No thyromegaly. Cardiovascular:      Rate and Rhythm: Normal rate and regular rhythm. Heart sounds: Normal heart sounds. No murmur. No friction rub. No gallop.     Pulmonary:      Effort: Pulmonary effort is normal. No respiratory distress. Breath sounds: Normal breath sounds. No wheezing or rales. Abdominal:      General: There is no distension. Palpations: Abdomen is soft. Tenderness: There is no abdominal tenderness. There is no guarding or rebound. Skin:     Findings: Lesion: .sta. Neurological:      Mental Status: She is alert and oriented to person, place, and time. Psychiatric:         Mood and Affect: Mood normal.         Behavior: Behavior normal.         Thought Content: Thought content normal.         Judgment: Judgment normal.      microalbumin:     Vitals:    07/13/20 0817   BP: 120/86   Pulse: 89   Temp: 97.2 °F (36.2 °C)   SpO2: 96%         ASSESSMENT/PLAN:  1. Essential hypertension  Stable. Continue current regimen  - Hepatic Function Panel; Future  - TSH without Reflex; Future    2. Acquired hypothyroidism  Stable. Continue current regimen  - TSH without Reflex; Future    3. Type 2 diabetes mellitus without complication, without long-term current use of insulin (HCC)  Stable. Continue current regimen    - Hemoglobin A1C; Future  - POCT microalbumin    4. Mixed hyperlipidemia  At goal. Continue to monitor    - Lipid Panel; Future  - Hepatic Function Panel;  Future

## 2020-07-15 ENCOUNTER — HOSPITAL ENCOUNTER (OUTPATIENT)
Age: 75
Discharge: HOME | End: 2020-07-15
Payer: MEDICARE

## 2020-07-15 LAB
ALBUMIN SERPL-MCNC: 3.6 G/DL (ref 3.4–5)
ALP BLD-CCNC: 68 U/L (ref 40–129)
ALT SERPL-CCNC: 12 U/L (ref 10–40)
AST SERPL-CCNC: 15 U/L (ref 15–37)
BILIRUB SERPL-MCNC: 0.7 MG/DL (ref 0–1)
BILIRUBIN DIRECT: <0.2 MG/DL (ref 0–0.3)
BILIRUBIN, INDIRECT: NORMAL MG/DL (ref 0–1)
CHOLESTEROL, TOTAL: 145 MG/DL (ref 0–199)
HDLC SERPL-MCNC: 54 MG/DL (ref 40–60)
LDL CHOLESTEROL CALCULATED: 63 MG/DL
TOTAL PROTEIN: 6.7 G/DL (ref 6.4–8.2)
TRIGL SERPL-MCNC: 142 MG/DL (ref 0–150)
TSH SERPL DL<=0.05 MIU/L-ACNC: 0.9 UIU/ML (ref 0.27–4.2)
VLDLC SERPL CALC-MCNC: 28 MG/DL

## 2020-07-15 PROCEDURE — 80061 LIPID PANEL: CPT

## 2020-07-15 PROCEDURE — 36415 COLL VENOUS BLD VENIPUNCTURE: CPT

## 2020-07-15 PROCEDURE — 83036 HEMOGLOBIN GLYCOSYLATED A1C: CPT

## 2020-07-15 PROCEDURE — 80076 HEPATIC FUNCTION PANEL: CPT

## 2020-07-15 PROCEDURE — 84443 ASSAY THYROID STIM HORMONE: CPT

## 2020-07-16 LAB
ESTIMATED AVERAGE GLUCOSE: 177.2 MG/DL
HBA1C MFR BLD: 7.8 %

## 2020-08-13 ENCOUNTER — OFFICE VISIT (OUTPATIENT)
Dept: ORTHOPEDIC SURGERY | Age: 75
End: 2020-08-13
Payer: MEDICARE

## 2020-08-13 VITALS — BODY MASS INDEX: 44.98 KG/M2 | HEIGHT: 65 IN | WEIGHT: 270 LBS

## 2020-08-13 PROCEDURE — 99203 OFFICE O/P NEW LOW 30 MIN: CPT | Performed by: ORTHOPAEDIC SURGERY

## 2020-08-13 RX ORDER — FAMOTIDINE 10 MG
10 TABLET ORAL 2 TIMES DAILY
COMMUNITY

## 2020-08-13 NOTE — PROGRESS NOTES
Chief Complaint    Knee Pain (RIGHT knee pain increasing over past several months; history of visco in 2017 which helped some)      History of Present Illness:  Nadiya Reilly is a 69 y.o. female presents to the office today for new problem. She has been seen in our office in the past but not for many years. Patient was sent in orthopedic consultation for Dr. Alexandro Landaverde. Patient is here with a chief complaint of bilateral knee pain. Patient has had pain for many years. She did receive bilateral knee Visco supplementation injections approximately 3-4 years ago. She did have good relief at that time. Her symptoms have returned without any new injury or trauma. Her pain is mostly concentrated over the lateral and patellofemoral joints of bilateral knees. Increased pain with activities and improvement with rest.  Patient is a diabetic. Her last hemoglobin A1c was 7.6. She also does suffer from obesity with a BMI of 44.93.     Pain Assessment  Location of Pain: Knee  Location Modifiers: Right  Severity of Pain: 8  Quality of Pain: Throbbing, Sharp, Dull, Aching  Duration of Pain: Persistent  Frequency of Pain: Constant  Aggravating Factors: Walking, Standing, Squatting, Stairs, Bending, Straightening  Limiting Behavior: Some  Relieving Factors: Rest]       Medical History:  Past Medical History:   Diagnosis Date    DJD (degenerative joint disease)     Hyperlipidemia     Hypertension     Hypothyroidism     Morbid obesity due to excess calories ([address] 2/11/2016    Type II or unspecified type diabetes mellitus without mention of complication, not stated as uncontrolled      Patient Active Problem List    Diagnosis Date Noted    Mixed hyperlipidemia 07/24/2017    Vitamin D deficiency 07/24/2017    Primary osteoarthritis of right knee 02/27/2017    Type 2 diabetes mellitus without complication, without long-term current use of insulin ([address] 02/17/2016    Morbid obesity due to excess calories ([address] tablet TAKE ONE TABLET BY MOUTH TWICE A DAY AS NEEDED FOR ANXIETY 60 tablet 5    lisinopril-hydroCHLOROthiazide (PRINZIDE;ZESTORETIC) 20-12.5 MG per tablet TAKE TWO TABLETS BY MOUTH DAILY 180 tablet 3    levothyroxine (SYNTHROID) 150 MCG tablet TAKE ONE TABLET BY MOUTH DAILY 90 tablet 3    Liraglutide (VICTOZA) 18 MG/3ML SOPN SC injection DIAL AND INJECT SUBCUTANEOUSLY 1.2 MG DAILY 8 pen 3    metFORMIN (GLUCOPHAGE) 1000 MG tablet TAKE ONE TABLET BY MOUTH TWICE A DAY WITH MEALS 180 tablet 2    glipiZIDE (GLUCOTROL XL) 5 MG extended release tablet TAKE TWO TABLETS BY MOUTH TWICE A  tablet 2    simvastatin (ZOCOR) 20 MG tablet TAKE ONE TABLET BY MOUTH EVERY EVENING 90 tablet 1    amLODIPine (NORVASC) 10 MG tablet TAKE ONE TABLET BY MOUTH DAILY 90 tablet 3    FLUoxetine (PROZAC) 40 MG capsule Take 1 capsule by mouth daily 90 capsule 3    hydrOXYzine (VISTARIL) 25 MG capsule TAKE ONE CAPSULE BY MOUTH THREE TIMES A DAY AS NEEDED FOR ITCHING 30 capsule 0    Cetirizine HCl (ZYRTEC PO) Take by mouth 2 times daily 2 BID      blood glucose test strips (ACCU-CHEK LIBBY PLUS) strip USE ONE STRIP TO TEST DAILY 100 strip 3    clotrimazole-betamethasone (LOTRISONE) 1-0.05 % cream       Insulin Pen Needle (PEN NEEDLES) 32G X 4 MM MISC USE ONCE DAILY FOR VICTOZA 100 each 2    Blood Glucose Monitoring Suppl (ACURA BLOOD GLUCOSE METER) W/DEVICE KIT Provide pt with glucometer under forumlary - ACCU-CHEK, Dx Type 2 DM, testing daily 1 kit 0    Lancets MISC 1 po daily, please provide insurance formulary brand - ACCU-CHEK, Dx Type 2 DM, testing daily 100 each 3    Vitamin D (CHOLECALCIFEROL) 1000 UNITS CAPS capsule Take 1,000 Units by mouth daily.  calcium-vitamin D (OSCAL-500) 500-200 MG-UNIT per tablet Take 2 tablets by mouth daily.  aspirin 81 MG tablet Take 81 mg by mouth daily. No current facility-administered medications for this visit.         Review of Systems:  Relevant review of systems and osteophyte formation present. On patient standing AP and PA flexed we are able to visualize her left knee. It does also demonstrate advanced lateral joint space narrowing. No fractures are identified. Impression:  Encounter Diagnoses   Name Primary?  Primary osteoarthritis of right knee Yes    Primary osteoarthritis of left knee     Morbid obesity with BMI of 40.0-44.9, adult ([facility]        Office Procedures:  Orders Placed This Encounter   Procedures    XR KNEE RIGHT (MIN 4 VIEWS)     Standing Status:   Future     Number of Occurrences:   1     Standing Expiration Date:   8/13/2021    [facility] [facility] [employer]     Referral Priority:   Routine     Referral Type:   Consult for Advice and Opinion     Referral Reason:   Specialty Services Required     Requested Specialty:   Nutrition     Number of Visits Requested:   1    PT aquatic therapy     Standing Status:   Future     Standing Expiration Date:   8/13/2021     Scheduling Instructions:      EVAL AND TREAT            1-2  X WEEK FOR 4 WEEKS    EUFLEXXA INJECTION (For Auth/Precert)     Standing Status:   Future     Standing Expiration Date:   8/13/2021       Treatment Plan:  I discussed with the patient the nature of osteoarthritis of the knee. We talked about treatment of arthritis and the various options that are involved with this. The patient understands that the treatments can vary from essentially doing nothing to a total joint replacement arthroplasty for arthritis. I then went on to describe the utilization of glucosamine and chondroitin sulfate as a joint nutrition product. We talked about the fact that this is essentially a joint vitamin with typically minimal side effects. We also talked about utilization of prescription over-the-counter anti-inflammatory medications as the next option. We also discussed the possibility of brace wear or orthotic wear if the patient has significant varus alignment.  We then went on to discuss

## 2020-08-17 ENCOUNTER — TELEPHONE (OUTPATIENT)
Dept: ORTHOPEDIC SURGERY | Age: 75
End: 2020-08-17

## 2020-09-03 ENCOUNTER — TELEPHONE (OUTPATIENT)
Dept: INTERNAL MEDICINE CLINIC | Age: 75
End: 2020-09-03

## 2020-09-03 RX ORDER — PREDNISONE 20 MG/1
40 TABLET ORAL DAILY
Qty: 8 TABLET | Refills: 0 | Status: SHIPPED | OUTPATIENT
Start: 2020-09-03 | End: 2020-09-07

## 2020-09-03 NOTE — TELEPHONE ENCOUNTER
Pt has hives, on and off since July. She usually has to take prednisone to clear all the way up. She takes pepcid and zyrtec and it had been helping. Chica Reaves is who she uses.

## 2020-09-03 NOTE — TELEPHONE ENCOUNTER
----- Message from Candida Layton sent at 9/2/2020  4:36 PM EDT -----  Subject: Message to Provider    QUESTIONS  Information for Provider? Having bad hives and wants to know if they can   get a prescription for prednisone  ---------------------------------------------------------------------------  --------------  CALL BACK INFO  What is the best way for the office to contact you? OK to respond with   secure message via [employer] portal (only for patients who have registered   [employer] account)  Preferred Call Back Phone Number? [phone]  ---------------------------------------------------------------------------  --------------  SCRIPT ANSWERS  Relationship to Patient?  Self

## 2020-09-09 ENCOUNTER — NURSE ONLY (OUTPATIENT)
Dept: ORTHOPEDIC SURGERY | Age: 75
End: 2020-09-09
Payer: MEDICARE

## 2020-09-09 PROCEDURE — 20611 DRAIN/INJ JOINT/BURSA W/US: CPT | Performed by: PHYSICIAN ASSISTANT

## 2020-09-09 RX ORDER — HYALURONATE SODIUM 10 MG/ML
40 SYRINGE (ML) INTRAARTICULAR ONCE
Status: COMPLETED | OUTPATIENT
Start: 2020-09-09 | End: 2020-09-09

## 2020-09-09 RX ADMIN — Medication 40 MG: at 09:17

## 2020-09-09 NOTE — PROGRESS NOTES
Diagnosis: Left and right knee osteoarthritis    Procedure: Left and right Visco supplementation injection #1    The patient is symptomatic from osteoarthritis of the left and right knee joint with documented radiological signs of arthritis. The patient has also failed 3 months of conservative treatment including home exercise, education, Tylenol and/or NSAIDs use. The patient was offered a Visco supplementation today. Risks, benefits, and alternatives to the injections were discussed in detail with the patient. The risks discussed included but are not limited to infection, skin reactions, hot swollen joints, and anaphylaxis. The patient gave verbal informed consent for the injection. The patient's skin was prepped with  3 sterile gauze  pads soaked with alcohol solution and the knee joint was injected with 2 mL of Euflexxa intra-articularly under sterile conditions. Technique: Under sterile conditions a [employer] ultrasound unit with a variable frequency (6.0-15.0 MHz) linear transducer was used to localize the placement of a 22-gauge needle into the knee joint. Findings: Successful needle placement for intra-articular Visco supplementation injection. Final images were taken and saved for permanent record. The patient tolerated the injection reasonably well. The patient was given instructions to ice the kne and avoid strenuous activities for 24-48 hours. The patient was instructed to call the office immediately if there is increased pain, redness, warmth, fever, or chills. We will see the patient back in one week for their second injection.
Left arm;

## 2020-09-16 ENCOUNTER — NURSE ONLY (OUTPATIENT)
Dept: ORTHOPEDIC SURGERY | Age: 75
End: 2020-09-16
Payer: MEDICARE

## 2020-09-16 RX ORDER — HYALURONATE SODIUM 10 MG/ML
40 SYRINGE (ML) INTRAARTICULAR ONCE
Status: COMPLETED | OUTPATIENT
Start: 2020-09-16 | End: 2020-09-16

## 2020-09-16 RX ADMIN — Medication 40 MG: at 09:09

## 2020-09-16 NOTE — PROGRESS NOTES
Diagnosis: Left and right knee osteoarthritis     Procedure: Left and right knee Visco supplementation injection #2     The patient returns today for their second Euflexxa injection in the left and right knee. The risks, benefits, and complications of the injections were again discussed in detail with the patient. The risks discussed included but are not limited to infection, skin reactions, hot swollen joints, and anaphylaxis. The patient gave verbal informed consent for the injection. The patient skin was prepped with 3 sterile gauze pads soaked with alcohol solution and the knee joint was injected with 2 mL of Euflexxa intra-articularly under sterile conditions . Technique: Under sterile conditions a [email or web address] ultrasound unit with a variable frequency (6.0-15.0 MHz) linear transducer was used to localize the placement of a 22-gauge needle into the shraddha joint. Findings: Successful needle placement for intra-articular Visco supplementation injection. Final images were taken and saved for permanent record. The patient tolerated the injection reasonably well. The patient was given instructions to ice the the knee and avoid strenuous activities for 24-48 hours. The patient was instructed to call the office immediately if there is increased pain, redness, warmth, fever, or chills. We will see the patient back in one week for the third injection.

## 2020-09-23 ENCOUNTER — NURSE ONLY (OUTPATIENT)
Dept: ORTHOPEDIC SURGERY | Age: 75
End: 2020-09-23
Payer: MEDICARE

## 2020-09-23 RX ORDER — HYALURONATE SODIUM 10 MG/ML
40 SYRINGE (ML) INTRAARTICULAR ONCE
Status: COMPLETED | OUTPATIENT
Start: 2020-09-23 | End: 2020-09-23

## 2020-09-23 RX ADMIN — Medication 40 MG: at 09:09

## 2020-09-24 RX ORDER — BLOOD SUGAR DIAGNOSTIC
STRIP MISCELLANEOUS
Qty: 100 STRIP | Refills: 2 | Status: SHIPPED | OUTPATIENT
Start: 2020-09-24 | End: 2021-07-30

## 2020-09-24 NOTE — TELEPHONE ENCOUNTER
Refill request for ACCU CHEK LIBBY PLUS STRIPS medication.      Name of Moira Summers [place]      Last visit - 7/13/20     Pending visit - 1/13/21    Last refill -5/7/20      Medication Contract signed -   Last Oarrs ran-         Additional Comments

## 2020-11-17 ENCOUNTER — OFFICE VISIT (OUTPATIENT)
Dept: PRIMARY CARE CLINIC | Age: 75
End: 2020-11-17
Payer: MEDICARE

## 2020-11-17 PROCEDURE — 99211 OFF/OP EST MAY X REQ PHY/QHP: CPT | Performed by: NURSE PRACTITIONER

## 2020-11-17 NOTE — PROGRESS NOTES
Martha Wolff received a viral test for COVID-19. They were educated on isolation and quarantine as appropriate. For any symptoms, they were directed to seek care from their PCP, given contact information to establish with a doctor, directed to an urgent care or the emergency room.

## 2020-11-17 NOTE — PATIENT INSTRUCTIONS

## 2020-11-20 LAB — SARS-COV-2, NAA: DETECTED

## 2021-01-13 ENCOUNTER — HOSPITAL ENCOUNTER (OUTPATIENT)
Age: 76
Discharge: HOME | End: 2021-01-13
Payer: MEDICARE

## 2021-01-13 ENCOUNTER — OFFICE VISIT (OUTPATIENT)
Dept: INTERNAL MEDICINE CLINIC | Age: 76
End: 2021-01-13
Payer: MEDICARE

## 2021-01-13 VITALS
WEIGHT: 265 LBS | DIASTOLIC BLOOD PRESSURE: 65 MMHG | OXYGEN SATURATION: 96 % | SYSTOLIC BLOOD PRESSURE: 120 MMHG | BODY MASS INDEX: 44.1 KG/M2 | HEART RATE: 93 BPM | TEMPERATURE: 98.1 F

## 2021-01-13 DIAGNOSIS — E11.9 TYPE 2 DIABETES MELLITUS WITHOUT COMPLICATION, WITHOUT LONG-TERM CURRENT USE OF INSULIN (HCC): ICD-10-CM

## 2021-01-13 DIAGNOSIS — E78.2 MIXED HYPERLIPIDEMIA: Primary | ICD-10-CM

## 2021-01-13 DIAGNOSIS — E78.2 MIXED HYPERLIPIDEMIA: ICD-10-CM

## 2021-01-13 DIAGNOSIS — E03.9 ACQUIRED HYPOTHYROIDISM: ICD-10-CM

## 2021-01-13 DIAGNOSIS — I10 ESSENTIAL HYPERTENSION: ICD-10-CM

## 2021-01-13 LAB
A/G RATIO: 1.3 (ref 1.1–2.2)
ALBUMIN SERPL-MCNC: 3.9 G/DL (ref 3.4–5)
ALP BLD-CCNC: 72 U/L (ref 40–129)
ALT SERPL-CCNC: 11 U/L (ref 10–40)
ANION GAP SERPL CALCULATED.3IONS-SCNC: 11 MMOL/L (ref 3–16)
AST SERPL-CCNC: 17 U/L (ref 15–37)
BILIRUB SERPL-MCNC: 0.7 MG/DL (ref 0–1)
BUN BLDV-MCNC: 19 MG/DL (ref 7–20)
CALCIUM SERPL-MCNC: 9.6 MG/DL (ref 8.3–10.6)
CHLORIDE BLD-SCNC: 100 MMOL/L (ref 99–110)
CHOLESTEROL, TOTAL: 142 MG/DL (ref 0–199)
CO2: 27 MMOL/L (ref 21–32)
CREAT SERPL-MCNC: 0.7 MG/DL (ref 0.6–1.2)
GFR AFRICAN AMERICAN: >60
GFR NON-AFRICAN AMERICAN: >60
GLOBULIN: 2.9 G/DL
GLUCOSE BLD-MCNC: 152 MG/DL (ref 70–99)
HDLC SERPL-MCNC: 64 MG/DL (ref 40–60)
LDL CHOLESTEROL CALCULATED: 57 MG/DL
POTASSIUM SERPL-SCNC: 3.9 MMOL/L (ref 3.5–5.1)
SODIUM BLD-SCNC: 138 MMOL/L (ref 136–145)
TOTAL PROTEIN: 6.8 G/DL (ref 6.4–8.2)
TRIGL SERPL-MCNC: 103 MG/DL (ref 0–150)
TSH REFLEX: 1.68 UIU/ML (ref 0.27–4.2)
VLDLC SERPL CALC-MCNC: 21 MG/DL

## 2021-01-13 PROCEDURE — 83036 HEMOGLOBIN GLYCOSYLATED A1C: CPT

## 2021-01-13 PROCEDURE — G8510 SCR DEP NEG, NO PLAN REQD: HCPCS | Performed by: INTERNAL MEDICINE

## 2021-01-13 PROCEDURE — 80053 COMPREHEN METABOLIC PANEL: CPT

## 2021-01-13 PROCEDURE — 36415 COLL VENOUS BLD VENIPUNCTURE: CPT

## 2021-01-13 PROCEDURE — 84443 ASSAY THYROID STIM HORMONE: CPT

## 2021-01-13 PROCEDURE — 99214 OFFICE O/P EST MOD 30 MIN: CPT | Performed by: INTERNAL MEDICINE

## 2021-01-13 PROCEDURE — 80061 LIPID PANEL: CPT

## 2021-01-13 SDOH — ECONOMIC STABILITY: TRANSPORTATION INSECURITY
IN THE PAST 12 MONTHS, HAS LACK OF TRANSPORTATION KEPT YOU FROM MEETINGS, WORK, OR FROM GETTING THINGS NEEDED FOR DAILY LIVING?: NOT ASKED

## 2021-01-13 SDOH — ECONOMIC STABILITY: FOOD INSECURITY: WITHIN THE PAST 12 MONTHS, THE FOOD YOU BOUGHT JUST DIDN'T LAST AND YOU DIDN'T HAVE MONEY TO GET MORE.: NEVER TRUE

## 2021-01-13 SDOH — ECONOMIC STABILITY: TRANSPORTATION INSECURITY
IN THE PAST 12 MONTHS, HAS THE LACK OF TRANSPORTATION KEPT YOU FROM MEDICAL APPOINTMENTS OR FROM GETTING MEDICATIONS?: NOT ASKED

## 2021-01-13 ASSESSMENT — PATIENT HEALTH QUESTIONNAIRE - PHQ9
SUM OF ALL RESPONSES TO PHQ9 QUESTIONS 1 & 2: 0
SUM OF ALL RESPONSES TO PHQ QUESTIONS 1-9: 0

## 2021-01-13 NOTE — PROGRESS NOTES
Patient presents with             Formerly [facility] [place]  1945      Chief Complaint   Patient presents with    Hypertension    Diabetes    Hyperlipidemia       HPI    Here for follow-up hypertension, diabetes, hyperlipidemia and hypothyroid. Fasting blood sugars have been in the 130s. Had a couple of hypoglycemic episodes where her blood sugar was in the 69s. Patient currently  On Victoza, metformin 1000 mg, glipizide 5 mg. She did decrease her dose of victoza and glipizide . Patient is also on an ACE inhibitor and simvastatin    Lipids have improved with simvastatin 20 mg. Takes levothyroxine appropriately in the morning. Labs reviewed 7/15/2020  TSH 0.90  Hemoglobin A1c 7.8  Liver tests within normal limits  Total cholesterol 145, triglycerides 142, HDL 54, LDL 63    Review of Systems   Constitutional: Negative for unexpected weight change. Respiratory: Negative for cough, chest tightness, shortness of breath and wheezing. Cardiovascular: Negative for chest pain, palpitations and leg swelling. Gastrointestinal: Negative for abdominal distention, constipation, diarrhea, nausea and vomiting. Musculoskeletal: Negative for arthralgias, back pain and myalgias. Neurological: Negative for tremors and numbness. Psychiatric/Behavioral: The patient is not hyperactive. All other systems reviewed and are negative.       Current Outpatient Medications   Medication Sig Dispense Refill    busPIRone (BUSPAR) 5 MG tablet Take 1 tablet by mouth 2 times daily as needed (anxiety) 60 tablet 0    Liraglutide (VICTOZA) 18 MG/3ML SOPN SC injection DIAL AND INJECT SUBCUTANEOUSLY 1.2 MG DAILY 8 pen 3    metFORMIN (GLUCOPHAGE) 1000 MG tablet TAKE ONE TABLET BY MOUTH TWICE A DAY WITH MEALS 180 tablet 2    glipiZIDE (GLUCOTROL XL) 5 MG extended release tablet TAKE TWO TABLETS BY MOUTH TWICE A  tablet 2    lisinopril-hydroCHLOROthiazide (PRINZIDE;ZESTORETIC) 20-12.5 MG per tablet TAKE TWO TABLETS BY MOUTH DAILY 180 tablet 0    simvastatin (ZOCOR) 20 MG tablet TAKE ONE TABLET BY MOUTH EVERY EVENING 90 tablet 1    levothyroxine (SYNTHROID) 150 MCG tablet TAKE ONE TABLET BY MOUTH DAILY 90 tablet 0    amLODIPine (NORVASC) 10 MG tablet TAKE ONE TABLET BY MOUTH DAILY 90 tablet 3    FLUoxetine (PROZAC) 40 MG capsule Take 1 capsule by mouth daily 90 capsule 3    hydrOXYzine (VISTARIL) 25 MG capsule TAKE ONE CAPSULE BY MOUTH THREE TIMES A DAY AS NEEDED FOR ITCHING 30 capsule 0    Cetirizine HCl (ZYRTEC PO) Take by mouth      blood glucose test strips (ACCU-CHEK LIBBY PLUS) strip USE ONE STRIP TO TEST DAILY 100 strip 3    clotrimazole-betamethasone (LOTRISONE) 1-0.05 % cream       Insulin Pen Needle (PEN NEEDLES) 32G X 4 MM MISC USE ONCE DAILY FOR VICTOZA 100 each 2    Blood Glucose Monitoring Suppl (ACURA BLOOD GLUCOSE METER) W/DEVICE KIT Provide pt with glucometer under forumlary - ACCU-CHEK, Dx Type 2 DM, testing daily 1 kit 0    Lancets MISC 1 po daily, please provide insurance formulary brand - ACCU-CHEK, Dx Type 2 DM, testing daily 100 each 3    Vitamin D (CHOLECALCIFEROL) 1000 UNITS CAPS capsule Take 1,000 Units by mouth daily.  calcium-vitamin D (OSCAL-500) 500-200 MG-UNIT per tablet Take 2 tablets by mouth daily.  aspirin 81 MG tablet Take 81 mg by mouth daily. No current facility-administered medications for this visit. Physical Exam  Constitutional:       General: She is not in acute distress. Appearance: She is well-developed. She is not diaphoretic. HENT:      Right Ear: External ear normal.      Left Ear: External ear normal.      Mouth/Throat:      Pharynx: No oropharyngeal exudate. Neck:      Musculoskeletal: Neck supple. Thyroid: No thyromegaly. Cardiovascular:      Rate and Rhythm: Normal rate and regular rhythm. Heart sounds: Normal heart sounds. No murmur. No friction rub. No gallop.     Pulmonary:      Effort: Pulmonary effort is normal. No respiratory distress. Breath sounds: Normal breath sounds. No wheezing or rales. Abdominal:      General: There is no distension. Palpations: Abdomen is soft. Tenderness: There is no abdominal tenderness. There is no guarding or rebound. Skin:     Findings: Lesion:  Neurological:      Mental Status: She is alert and oriented to person, place, and time. Microfilament wnl B  Psychiatric:         Mood and Affect: Mood normal.         Behavior: Behavior normal.         Thought Content: Thought content normal.         Judgment: Judgment normal.         ASSESSMENT/PLAN:  1. Essential hypertension  Stable. Continue current regimen  - TSH without Reflex; Future    2. Acquired hypothyroidism  Stable. Continue current regimen  - TSH without Reflex; Future    3. Type 2 diabetes mellitus without complication, without long-term current use of insulin (HCC)  Stable. Continue current regimen  - Hemoglobin A1C; Future    4. Mixed hyperlipidemia  At goal. Continue to monitor  - Lipid Panel;  Future

## 2021-01-14 LAB
ESTIMATED AVERAGE GLUCOSE: 148.5 MG/DL
HBA1C MFR BLD: 6.8 %

## 2021-02-01 DIAGNOSIS — I10 ESSENTIAL HYPERTENSION: ICD-10-CM

## 2021-02-01 RX ORDER — AMLODIPINE BESYLATE 10 MG/1
TABLET ORAL
Qty: 90 TABLET | Refills: 2 | Status: SHIPPED | OUTPATIENT
Start: 2021-02-01 | End: 2022-01-04 | Stop reason: SDUPTHER

## 2021-02-01 NOTE — TELEPHONE ENCOUNTER
Refill request for amlodipine  medication.      Name of Pharmacy- [facility]       Last visit - 1-     Pending visit - 4-    Last refill -2-      Medication Contract signed -   Herve kathleen-         Additional Comments

## 2021-02-24 ENCOUNTER — OFFICE VISIT (OUTPATIENT)
Dept: INTERNAL MEDICINE CLINIC | Age: 76
End: 2021-02-24
Payer: MEDICARE

## 2021-02-24 VITALS
HEIGHT: 65 IN | SYSTOLIC BLOOD PRESSURE: 102 MMHG | WEIGHT: 267 LBS | DIASTOLIC BLOOD PRESSURE: 62 MMHG | HEART RATE: 90 BPM | TEMPERATURE: 97.3 F | BODY MASS INDEX: 44.48 KG/M2 | OXYGEN SATURATION: 98 %
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
## 2021-02-24 DIAGNOSIS — S61.259A DOG BITE OF FINGER, INITIAL ENCOUNTER: Primary | ICD-10-CM

## 2021-02-24 PROCEDURE — 99213 OFFICE O/P EST LOW 20 MIN: CPT | Performed by: NURSE PRACTITIONER

## 2021-02-24 RX ORDER — AMOXICILLIN AND CLAVULANATE POTASSIUM 875; 125 MG/1; MG/1
1 TABLET, FILM COATED ORAL 2 TIMES DAILY
Qty: 14 TABLET | Refills: 0 | Status: SHIPPED | OUTPATIENT
Start: 2021-02-24 | End: 2021-03-03

## 2021-02-24 ASSESSMENT — ENCOUNTER SYMPTOMS
ABDOMINAL DISTENTION: 0
SHORTNESS OF BREATH: 0
CONSTIPATION: 0
DIARRHEA: 0
CHEST TIGHTNESS: 0
NAUSEA: 0
VOMITING: 0

## 2021-02-24 NOTE — PROGRESS NOTES
[facility]  [address]  Tel:[phone]    Priti Watson is a 77 y.o. female who presents today for her medical conditions/complaints as noted below. Priti Watson is c/o of Animal Bite (right hand middle finger got bit yesterday.  )      Chief Complaint   Patient presents with    Animal Bite     right hand middle finger got bit yesterday. HPI:     Ms. Yeyo Andersen presents after dog bite to the right middle finger while trying to remove its collar. The injury occurred yesterday. She states she has noticed swelling and drainage with mild pain. Denies fever, spreading pain, inability to move the finger. States the dog is a small breed (chihuahua mix). She has been cleaning with peroxide and bandaging with dry gauze. Tdap is UTD. Past Medical History:   Diagnosis Date    DJD (degenerative joint disease)     Hyperlipidemia     Hypertension     Hypothyroidism     Morbid obesity due to excess calories ([facility] [address] 2016    Type II or unspecified type diabetes mellitus without mention of complication, not stated as uncontrolled         Past Surgical History:   Procedure Laterality Date    CHOLECYSTECTOMY      COLONOSCOPY      DILATION AND CURETTAGE OF UTERUS      GASTRIC BYPASS SURGERY      HERNIA REPAIR      HYSTERECTOMY, TOTAL ABDOMINAL      KNEE SURGERY      TUBAL LIGATION         No family history on file.     Social History     Tobacco Use    Smoking status: Former Smoker     Packs/day: 1.00     Years: 15.00     Pack years: 15.00     Types: Cigarettes     Quit date: 1978     Years since quittin.1    Smokeless tobacco: Never Used   Substance Use Topics    Alcohol use: No        Current Outpatient Medications   Medication Sig Dispense Refill    amoxicillin-clavulanate (AUGMENTIN) 875-125 MG per tablet Take 1 tablet by mouth 2 times daily for 7 days 14 tablet 0    amLODIPine (NORVASC) 10 MG tablet TAKE ONE TABLET BY MOUTH DAILY 90 tablet 2    simvastatin (ZOCOR) 20 MG tablet TAKE ONE TABLET BY MOUTH EVERY EVENING 90 tablet 3    blood glucose test strips (ACCU-CHEK LIBBY PLUS) strip USE TO TEST ONCE DAILY 100 strip 2    famotidine (PEPCID) 10 MG tablet Take 10 mg by mouth 2 times daily      busPIRone (BUSPAR) 5 MG tablet TAKE ONE TABLET BY MOUTH TWICE A DAY AS NEEDED FOR ANXIETY 60 tablet 5    lisinopril-hydroCHLOROthiazide (PRINZIDE;ZESTORETIC) 20-12.5 MG per tablet TAKE TWO TABLETS BY MOUTH DAILY 180 tablet 3    levothyroxine (SYNTHROID) 150 MCG tablet TAKE ONE TABLET BY MOUTH DAILY 90 tablet 3    Liraglutide (VICTOZA) 18 MG/3ML SOPN SC injection DIAL AND INJECT SUBCUTANEOUSLY 1.2 MG DAILY 8 pen 3    metFORMIN (GLUCOPHAGE) 1000 MG tablet TAKE ONE TABLET BY MOUTH TWICE A DAY WITH MEALS 180 tablet 2    glipiZIDE (GLUCOTROL XL) 5 MG extended release tablet TAKE TWO TABLETS BY MOUTH TWICE A  tablet 2    FLUoxetine (PROZAC) 40 MG capsule Take 1 capsule by mouth daily 90 capsule 3    hydrOXYzine (VISTARIL) 25 MG capsule TAKE ONE CAPSULE BY MOUTH THREE TIMES A DAY AS NEEDED FOR ITCHING 30 capsule 0    Cetirizine HCl (ZYRTEC PO) Take by mouth 2 times daily 2 BID      clotrimazole-betamethasone (LOTRISONE) 1-0.05 % cream       Insulin Pen Needle (PEN NEEDLES) 32G X 4 MM MISC USE ONCE DAILY FOR VICTOZA 100 each 2    Blood Glucose Monitoring Suppl (ACURA BLOOD GLUCOSE METER) W/DEVICE KIT Provide pt with glucometer under forumlary - ACCU-CHEK, Dx Type 2 DM, testing daily 1 kit 0    Lancets MISC 1 po daily, please provide insurance formulary brand - ACCU-CHEK, Dx Type 2 DM, testing daily 100 each 3    Vitamin D (CHOLECALCIFEROL) 1000 UNITS CAPS capsule Take 1,000 Units by mouth daily.  calcium-vitamin D (OSCAL-500) 500-200 MG-UNIT per tablet Take 2 tablets by mouth daily.  aspirin 81 MG tablet Take 81 mg by mouth daily. No current facility-administered medications for this visit. No Known Allergies    Subjective:      Review of Systems   Constitutional: Negative for activity change, fatigue and unexpected weight change. Respiratory: Negative for chest tightness and shortness of breath. Cardiovascular: Negative for chest pain, palpitations and leg swelling. Gastrointestinal: Negative for abdominal distention, constipation, diarrhea, nausea and vomiting. Genitourinary: Negative for difficulty urinating and urgency. Skin: Positive for wound (Bite wound to right middle finger. States she noticed 5-6 wound marks with swelling/discomfort). Negative for rash. Neurological: Negative for dizziness, weakness and light-headedness. Objective:     Vitals:    02/24/21 1338   BP: 102/62   Site: Left Lower Arm   Position: Sitting   Cuff Size: Medium Adult   Pulse: 90   Temp: 97.3 °F (36.3 °C)   TempSrc: Infrared   SpO2: 98%   Weight: 267 lb (121.1 kg)   Height: 5' 5\" (1.651 m)       Physical Exam  Vitals signs and nursing note reviewed. Constitutional:       Appearance: Normal appearance. She is well-developed. HENT:      Head: Normocephalic and atraumatic. Right Ear: Hearing and external ear normal.      Left Ear: Hearing and external ear normal.      Nose: Nose normal.   Eyes:      General: Lids are normal.      Pupils: Pupils are equal, round, and reactive to light. Neck:      Musculoskeletal: Normal range of motion. Cardiovascular:      Rate and Rhythm: Normal rate and regular rhythm. No extrasystoles are present. Chest Wall: PMI is not displaced. Pulses: Normal pulses. Heart sounds: Normal heart sounds, S1 normal and S2 normal. Heart sounds not distant. No murmur. No systolic murmur. No diastolic murmur. No friction rub. No gallop. No S3 or S4 sounds. Pulmonary:      Effort: Pulmonary effort is normal. No accessory muscle usage or respiratory distress. Breath sounds: Normal breath sounds.  No decreased breath sounds, wheezing, rhonchi or rales.   Abdominal:      General: Bowel sounds are normal.      Palpations: Abdomen is soft. Musculoskeletal:        Hands:    Skin:     General: Skin is warm and dry. Neurological:      Mental Status: She is alert. Psychiatric:         Speech: Speech normal.         Assessment & Plan: The following diagnoses and conditions are stable with no further orders unless indicated:    1. Dog bite of finger, initial encounter        Ondina Martinez was seen today for animal bite. Diagnoses and all orders for this visit:    Dog bite of finger, initial encounter  -     amoxicillin-clavulanate (AUGMENTIN) 875-125 MG per tablet; Take 1 tablet by mouth 2 times daily for 7 days    Recommend augmentin prophylactically to prevent infection. Encouraged cleaning by running under faucet. Can clean gently with antibacterial soap. Cover if going out, however can leave ELENI otherwise. 20    Return if symptoms worsen or fail to improve. Total time spent reviewing patient chart, vitals, assessment, documentation: 20   min    Patientshould call the office immediately with new or ongoing signs or symptoms or worsening, or proceed to the emergency room. If you are on medications which could impair your senses, you are at risk of weakness, falls,dizziness, or drowsiness. You should be careful during activities which could place you at risk of harm, such as climbing, using stairs, operating machinery, or driving vehicles. If you feel you cannot safely do theseactivities, you should request others to help you, or avoid the activities altogether. If you are drowsy for any other reason, you should use the same precautions as listed above. Call if pattern of symptoms change or persists for an extended time.       Pedrito Barreto

## 2021-04-01 DIAGNOSIS — F32.A DEPRESSION, UNSPECIFIED DEPRESSION TYPE: ICD-10-CM

## 2021-04-01 DIAGNOSIS — E11.9 TYPE 2 DIABETES MELLITUS WITHOUT COMPLICATION, WITHOUT LONG-TERM CURRENT USE OF INSULIN (HCC): ICD-10-CM

## 2021-04-01 RX ORDER — FLUOXETINE HYDROCHLORIDE 40 MG/1
CAPSULE ORAL
Qty: 90 CAPSULE | Refills: 2 | Status: SHIPPED | OUTPATIENT
Start: 2021-04-01 | End: 2022-01-04 | Stop reason: SDUPTHER

## 2021-04-01 NOTE — TELEPHONE ENCOUNTER
Refill request for metformin medication.      Name of Pharmacy- [facility]      Last visit - 2-24-21     Pending visit - 4-21-21    Last refill -12-20-20      Medication Contract signed -   Herve kathleen-         Additional Comments

## 2021-04-21 ENCOUNTER — OFFICE VISIT (OUTPATIENT)
Dept: INTERNAL MEDICINE CLINIC | Age: 76
End: 2021-04-21
Payer: MEDICARE

## 2021-04-21 VITALS
SYSTOLIC BLOOD PRESSURE: 132 MMHG | OXYGEN SATURATION: 99 % | DIASTOLIC BLOOD PRESSURE: 76 MMHG | TEMPERATURE: 97.3 F | WEIGHT: 275 LBS | BODY MASS INDEX: 45.76 KG/M2 | HEART RATE: 71 BPM

## 2021-04-21 DIAGNOSIS — E11.9 TYPE 2 DIABETES MELLITUS WITHOUT COMPLICATION, WITHOUT LONG-TERM CURRENT USE OF INSULIN (HCC): ICD-10-CM

## 2021-04-21 DIAGNOSIS — Z00.00 ROUTINE GENERAL MEDICAL EXAMINATION AT A HEALTH CARE FACILITY: Primary | ICD-10-CM

## 2021-04-21 DIAGNOSIS — Z12.11 SCREEN FOR COLON CANCER: ICD-10-CM

## 2021-04-21 LAB — HBA1C MFR BLD: 7.1 %

## 2021-04-21 PROCEDURE — G0439 PPPS, SUBSEQ VISIT: HCPCS | Performed by: NURSE PRACTITIONER

## 2021-04-21 PROCEDURE — 3051F HG A1C>EQUAL 7.0%<8.0%: CPT | Performed by: NURSE PRACTITIONER

## 2021-04-21 PROCEDURE — 83036 HEMOGLOBIN GLYCOSYLATED A1C: CPT | Performed by: NURSE PRACTITIONER

## 2021-04-21 ASSESSMENT — LIFESTYLE VARIABLES
HOW OFTEN DURING THE LAST YEAR HAVE YOU FOUND THAT YOU WERE NOT ABLE TO STOP DRINKING ONCE YOU HAD STARTED: 0
HOW OFTEN DURING THE LAST YEAR HAVE YOU BEEN UNABLE TO REMEMBER WHAT HAPPENED THE NIGHT BEFORE BECAUSE YOU HAD BEEN DRINKING: 0
AUDIT TOTAL SCORE: 5
HOW OFTEN DURING THE LAST YEAR HAVE YOU NEEDED AN ALCOHOLIC DRINK FIRST THING IN THE MORNING TO GET YOURSELF GOING AFTER A NIGHT OF HEAVY DRINKING: 0
HOW MANY STANDARD DRINKS CONTAINING ALCOHOL DO YOU HAVE ON A TYPICAL DAY: 1
HAS A RELATIVE, FRIEND, DOCTOR, OR ANOTHER HEALTH PROFESSIONAL EXPRESSED CONCERN ABOUT YOUR DRINKING OR SUGGESTED YOU CUT DOWN: 0
HOW OFTEN DO YOU HAVE SIX OR MORE DRINKS ON ONE OCCASION: 0
HOW OFTEN DURING THE LAST YEAR HAVE YOU HAD A FEELING OF GUILT OR REMORSE AFTER DRINKING: 0

## 2021-04-21 ASSESSMENT — PATIENT HEALTH QUESTIONNAIRE - PHQ9
SUM OF ALL RESPONSES TO PHQ QUESTIONS 1-9: 0
1. LITTLE INTEREST OR PLEASURE IN DOING THINGS: 0
SUM OF ALL RESPONSES TO PHQ9 QUESTIONS 1 & 2: 0

## 2021-04-21 NOTE — PATIENT INSTRUCTIONS
Personalized Preventive Plan for Tee Wang - 4/21/2021  Medicare offers a range of preventive health benefits. Some of the tests and screenings are paid in full while other may be subject to a deductible, co-insurance, and/or copay. Some of these benefits include a comprehensive review of your medical history including lifestyle, illnesses that may run in your family, and various assessments and screenings as appropriate. After reviewing your medical record and screening and assessments performed today your provider may have ordered immunizations, labs, imaging, and/or referrals for you. A list of these orders (if applicable) as well as your Preventive Care list are included within your After Visit Summary for your review. Other Preventive Recommendations:    · A preventive eye exam performed by an eye specialist is recommended every 1-2 years to screen for glaucoma; cataracts, macular degeneration, and other eye disorders. · A preventive dental visit is recommended every 6 months. · Try to get at least 150 minutes of exercise per week or 10,000 steps per day on a pedometer . · Order or download the FREE \"Exercise & Physical Activity: Your Everyday Guide\" from [employer]. Call 9-213.339.9652 or search [employer] online. · You need 5934-7722 mg of calcium and 4362-0601 IU of vitamin D per day. It is possible to meet your calcium requirement with diet alone, but a vitamin D supplement is usually necessary to meet this goal.  · When exposed to the sun, use a sunscreen that protects against both UVA and UVB radiation with an SPF of 30 or greater. Reapply every 2 to 3 hours or after sweating, drying off with a towel, or swimming. · Always wear a seat belt when traveling in a car. Always wear a helmet when riding a bicycle or motorcycle. Set Sit and Fit for after yogurt each day!!!!!!!!    Grab lunch and go to [place] or after cards go to [place].      Fill out Living Will, JEANE    Call Colog[facility] if you do not get the mailing for Colon Cancer screening    Schedule Dentist - call insurance to find out where to go. Take Glipizide with breakfast and dinner (within 30 minutes of food).

## 2021-04-21 NOTE — PROGRESS NOTES
Medicare Annual Wellness Visit  Name: Barbra Ellison Date: 2021   MRN: <[id number]> Sex: Female   Age: 77 y.o. Ethnicity: Non-/Non    : 1945 Race: Nicky Valentine is here for Medicare AWV, Eye Exam (indy), and Diabetes (7.1)    Screenings for behavioral, psychosocial and functional/safety risks, and cognitive dysfunction are all negative except as indicated below. These results, as well as other patient data from the [address] form, are documented in Flowsheets linked to this Encounter. Type 2 DM. Pt states she skips Victoza starting last week d/t below 100 readings. She is nervous about glucose dropping too low. Glipizide 2pills twice a day until 1 year ago, she decreased 1 pill twice a day (does not always take with food). She states she has not been motivated to move or exercise. Does have some goals set. No Known Allergies      Prior to Visit Medications    Medication Sig Taking?  Authorizing Provider   metFORMIN (GLUCOPHAGE) 1000 MG tablet TAKE ONE TABLET BY MOUTH TWICE A DAY WITH MEALS  Mayi Bell MD   FLUoxetine (PROZAC) 40 MG capsule TAKE ONE CAPSULE BY MOUTH DAILY  Mayi Bell MD   amLODIPine (NORVASC) 10 MG tablet TAKE ONE TABLET BY MOUTH DAILY  Maiy Bell MD   simvastatin (ZOCOR) 20 MG tablet TAKE ONE TABLET BY MOUTH EVERY EVENING  Mayi Bell MD   blood glucose test strips (ACCU-CHEK LIBBY PLUS) strip USE TO TEST ONCE DAILY  Mayi Bell MD   famotidine (PEPCID) 10 MG tablet Take 10 mg by mouth 2 times daily  Historical Provider, MD   busPIRone (BUSPAR) 5 MG tablet TAKE ONE TABLET BY MOUTH TWICE A DAY AS NEEDED FOR ANXIETY  Mayi Bell MD   lisinopril-hydroCHLOROthiazide (PRINZIDE;ZESTORETIC) 20-12.5 MG per tablet TAKE TWO TABLETS BY MOUTH DAILY  Mayi Bell MD   levothyroxine (SYNTHROID) 150 MCG tablet TAKE ONE TABLET BY MOUTH DAILY  Mayi Bell MD   Liraglutide (VICTOZA) 18 MG/3ML SOPN SC injection DIAL AND INJECT SUBCUTANEOUSLY 1.2 MG DAILY  Bull Vasques MD   glipiZIDE (GLUCOTROL XL) 5 MG extended release tablet TAKE TWO TABLETS BY MOUTH TWICE A DAY  Bull Vasques MD   hydrOXYzine (VISTARIL) 25 MG capsule TAKE ONE CAPSULE BY MOUTH THREE TIMES A DAY AS NEEDED FOR ITCHING  BASSAM Pierre - CNP   Cetirizine HCl (ZYRTEC PO) Take by mouth 2 times daily 2 BID  Historical Provider, MD   clotrimazole-betamethasone (LOTRISONE) 1-0.05 % cream   Historical Provider, MD   Insulin Pen Needle (PEN NEEDLES) 32G X 4 MM MISC USE ONCE DAILY FOR Javan Matt MD   Blood Glucose Monitoring Suppl (ACURA BLOOD GLUCOSE METER) W/DEVICE KIT Provide pt with glucometer under forumlary - ACCU-CHEK, Dx Type 2 DM, testing daily  BASSAM Norwood CNP   Lancets MISC 1 po daily, please provide insurance formulary brand - ACCU-CHEK, Dx Type 2 DM, testing daily  BASSAM Orr CNP   Vitamin D (CHOLECALCIFEROL) 1000 UNITS CAPS capsule Take 1,000 Units by mouth daily. Historical Provider, MD   calcium-vitamin D (OSCAL-500) 500-200 MG-UNIT per tablet Take 2 tablets by mouth daily. Historical Provider, MD   aspirin 81 MG tablet Take 81 mg by mouth daily. Historical Provider, MD         Past Medical History:   Diagnosis Date    DJD (degenerative joint disease)     Hyperlipidemia     Hypertension     Hypothyroidism     Morbid obesity due to excess calories ([address] 2/11/2016    Type II or unspecified type diabetes mellitus without mention of complication, not stated as uncontrolled        Past Surgical History:   Procedure Laterality Date    CHOLECYSTECTOMY      COLONOSCOPY      DILATION AND CURETTAGE OF UTERUS      GASTRIC BYPASS SURGERY      HERNIA REPAIR      HYSTERECTOMY, TOTAL ABDOMINAL      KNEE SURGERY      TUBAL LIGATION         History reviewed. No pertinent family history.     CareTeam (Including outside providers/suppliers regularly involved in providing care):   Patient Care Team:  Ludwig Haynes MD as PCP - General (Internal Medicine)  Ludwig Haynes MD as PCP - [facility]    Wt Readings from Last 3 Encounters:   04/21/21 275 lb (124.7 kg)   02/24/21 267 lb (121.1 kg)   01/13/21 265 lb (120.2 kg)     Vitals:    04/21/21 0850   BP: 132/76   Site: Left Upper Arm   Position: Sitting   Cuff Size: Large Adult   Pulse: 71   Temp: 97.3 °F (36.3 °C)   TempSrc: Temporal   SpO2: 99%   Weight: 275 lb (124.7 kg)     Body mass index is 45.76 kg/m². Based upon direct observation of the patient, evaluation of cognition reveals recent and remote memory intact. General Appearance: alert and oriented to person, place and time, well developed and well- nourished, in no acute distress  Skin: warm and dry, no rash or erythema  Head: normocephalic and atraumatic  Eyes: pupils equal, round, and reactive to light, extraocular eye movements intact, conjunctivae normal  ENT: tympanic membrane, external ear and ear canal normal bilaterally, nose without deformity, nasal mucosa and turbinates normal without polyps  Neck: supple and non-tender without mass, no thyromegaly or thyroid nodules, no cervical lymphadenopathy  Pulmonary/Chest: clear to auscultation bilaterally- no wheezes, rales or rhonchi, normal air movement, no respiratory distress  Cardiovascular: normal rate, regular rhythm, normal S1 and S2, no murmurs, rubs, clicks, or gallops, distal pulses intact, no carotid bruits  Abdomen: soft, non-tender, non-distended, normal bowel sounds, no masses or organomegaly  Extremities: no cyanosis, clubbing or edema  Musculoskeletal: normal range of motion, no joint swelling, deformity or tenderness  Neurologic: reflexes normal and symmetric, no cranial nerve deficit, gait, coordination and speech normal  Morbidly obese. Patient's complete Health Risk Assessment and screening values have been reviewed and are found in Flowsheets.  The following problems were reviewed today and where indicated follow up appointments were made and/or referrals ordered. Positive Risk Factor Screenings with Interventions:     Fall Risk:  Timed Up and Go Test > 12 seconds? (Complete if either Fall Risk answers are Yes): no  2 or more falls in past year?: no  Fall with injury in past year?: (!) yes(fell up steps going into daughters house.)  Fall Risk Interventions:    · Home safety tips provided          General Health and ACP:  General  In general, how would you say your health is?: Very Good  In the past 7 days, have you experienced any of the following?  New or Increased Pain, New or Increased Fatigue, Loneliness, Social Isolation, Stress or Anger?: None of These  Do you get the social and emotional support that you need?: Yes  Do you have a Living Will?: (!) No  Advance Directives     Power of [facility] Will ACP-Advance Directive ACP-Power of     Not on File Not on File Not on File Not on File      General Health Risk Interventions:  · No Living Will: Advance Care Planning addressed with patient today and Given copy     Health Habits/Nutrition:  Health Habits/Nutrition  Do you exercise for at least 20 minutes 2-3 times per week?: (!) No  Have you lost any weight without trying in the past 3 months?: No  Do you eat only one meal per day?: No  Have you seen the dentist within the past year?: (!) No     Health Habits/Nutrition Interventions:  · Inadequate physical activity:  patient agrees to exercise for at least 150 minutes/week  · Dental exam overdue:  patient encouraged to make appointment with his/her dentist     Safety:  Safety  Do you have working smoke detectors?: Yes  Have all throw rugs been removed or fastened?: (!) No  Do you have non-slip mats or surfaces in all bathtubs/showers?: Yes  Do all of your stairways have a railing or banister?: Yes  Are your doorways, halls and stairs free of clutter?: Yes  Do you always fasten your seatbelt when you are in a car?: Yes  Safety Interventions:  · Home safety tips provided     Personalized Preventive Plan   Current Health Maintenance Status  Immunization History   Administered Date(s) Administered    COVID-19, Moderna, PF, 100mcg/0.5mL 02/22/2021, 03/29/2021    Influenza Vaccine, unspecified formulation 10/01/2016    Influenza Virus Vaccine 10/01/2017    Influenza, High Dose (Fluzone 65 yrs and older) 09/26/2018    Influenza, Quadv, IM, (6 mo and older Fluzone, Flulaval, Fluarix and 3 yrs and older Afluria) 10/05/2020    Influenza, Quadv, Recombinant, IM PF (Flublok 18 yrs and older) 10/02/2020    Influenza, Triv, inactivated, subunit, adjuvanted, IM (Fluad 65 yrs and older) 10/11/2019    Pneumococcal Conjugate 13-valent ([id number]) 12/11/2015    Pneumococcal Polysaccharide ([id number]) 09/04/2012    Tdap (Boostrix, Adacel) 02/01/2017    Zoster Live (Zostavax) 05/28/2008    Zoster Recombinant (Shingrix) 03/02/2018, 07/02/2018        Health Maintenance   Topic Date Due    Diabetic retinal exam  05/24/2020    Colon Cancer Screen FIT/FOBT  09/22/2020    Annual Wellness Visit (AWV)  04/21/2021    Diabetic microalbuminuria test  07/13/2021    Diabetic foot exam  01/13/2022    Lipid screen  01/13/2022    TSH testing  01/13/2022    Potassium monitoring  01/13/2022    Creatinine monitoring  01/13/2022    A1C test (Diabetic or Prediabetic)  04/21/2022    Breast cancer screen  06/25/2022    DTaP/Tdap/Td vaccine (2 - Td) 02/01/2027    DEXA (modify frequency per FRAX score)  Completed    Flu vaccine  Completed    Shingles Vaccine  Completed    Pneumococcal 65+ years Vaccine  Completed    COVID-19 Vaccine  Completed    Hepatitis C screen  Completed    Hepatitis A vaccine  Aged Out    Hib vaccine  Aged Out    Meningococcal (ACWY) vaccine  Aged Out     Recommendations for [facility] [employer] Due: see orders and patient instructions/AVS.  .   Recommended screening schedule for the next 5-10 years is provided to the patient in written form: see Patient Instructions/AVS.    Camilo Justiceite was seen today for medicare awv, eye exam and diabetes. Diagnoses and all orders for this visit:    Routine general medical examination at a health care facility  Enc sit and fit after morning yogurt  Move more and walk small loop at [employer]. Type 2 diabetes mellitus without complication, without long-term current use of insulin (HCC)  -     POCT glycosylated hemoglobin (Hb A1C)  Maintain medications, A1c ok at 7.1  Move Glipizide twice a day to breakfast and dinner (within 20-30 min of food)    Screen for colon cancer  -     Cologuard (For External Results Only);  Future

## 2021-05-07 ENCOUNTER — TELEPHONE (OUTPATIENT)
Dept: INTERNAL MEDICINE CLINIC | Age: 76
End: 2021-05-07

## 2021-05-07 DIAGNOSIS — R19.5 POSITIVE COLORECTAL CANCER SCREENING USING COLOGUARD TEST: Primary | ICD-10-CM

## 2021-05-07 NOTE — TELEPHONE ENCOUNTER
Please let her know that results were positive and she will need to have a colonoscopy with preferred GI doctor

## 2021-07-30 DIAGNOSIS — E11.9 TYPE 2 DIABETES MELLITUS WITHOUT COMPLICATION (HCC): ICD-10-CM

## 2021-07-30 RX ORDER — BLOOD SUGAR DIAGNOSTIC
STRIP MISCELLANEOUS
Qty: 50 STRIP | Refills: 1 | Status: SHIPPED | OUTPATIENT
Start: 2021-07-30

## 2021-08-31 DIAGNOSIS — E11.9 TYPE 2 DIABETES MELLITUS WITHOUT COMPLICATION, WITHOUT LONG-TERM CURRENT USE OF INSULIN (HCC): ICD-10-CM

## 2021-08-31 RX ORDER — BUSPIRONE HYDROCHLORIDE 5 MG/1
TABLET ORAL
Qty: 60 TABLET | Refills: 0 | Status: SHIPPED | OUTPATIENT
Start: 2021-08-31

## 2021-08-31 RX ORDER — GLIPIZIDE 5 MG/1
TABLET, FILM COATED, EXTENDED RELEASE ORAL
Qty: 360 TABLET | Refills: 0 | Status: SHIPPED | OUTPATIENT
Start: 2021-08-31 | End: 2022-02-23 | Stop reason: ALTCHOICE

## 2021-08-31 RX ORDER — LEVOTHYROXINE SODIUM 0.15 MG/1
TABLET ORAL
Qty: 90 TABLET | Refills: 0 | Status: SHIPPED | OUTPATIENT
Start: 2021-08-31 | End: 2021-12-12 | Stop reason: SDUPTHER

## 2021-08-31 NOTE — TELEPHONE ENCOUNTER
Refill Request:BUSPIRONE 5    [facility]     Last Seen: 4/21/2021    Last Written: 8/11/20  WITH 5 RFS      Refill Request: LEVOTHYROXINE   150    [facility]     Last Seen: 4/21/2021    Last Written: 7/22/20 90 TABS  WITH 3 RFS      Refill Request:    GLIPIZIDE 5 MG     [facility]     Last Seen: 4/21/2021    Last Written: 8/11/20  360 TABS WITH 2 RFS      Next Appointment:   Future Appointments   Date Time Provider Majo Christopher   10/21/2021  9:40 AM MD AMAN Bowens [facility]   4/22/2022  9:00 AM BASSAM Lynne - CNP [facility]

## 2021-09-01 DIAGNOSIS — I10 ESSENTIAL HYPERTENSION: ICD-10-CM

## 2021-09-01 RX ORDER — LISINOPRIL AND HYDROCHLOROTHIAZIDE 20; 12.5 MG/1; MG/1
TABLET ORAL
Qty: 180 TABLET | Refills: 0 | Status: SHIPPED | OUTPATIENT
Start: 2021-09-01

## 2021-09-01 NOTE — TELEPHONE ENCOUNTER
Refill request for  LISINOPRIL  medication.      Name of [address] [place] [address]      Last visit - 4-     Pending visit - 10-    Last refill - 7-      Medication Contract signed -   Last Edith kathleen-         Additional Comments

## 2021-09-21 ENCOUNTER — TELEPHONE (OUTPATIENT)
Dept: INTERNAL MEDICINE CLINIC | Age: 76
End: 2021-09-21

## 2021-09-21 NOTE — TELEPHONE ENCOUNTER
----- Message from Maida Pabon sent at 9/21/2021  9:06 AM EDT -----  Subject: Message to Provider    QUESTIONS  Information for Provider? Esperanza Savage called 9/21/21, sees provider   gaby Figueroa is leaving and askes if she can be taken on by   provider Tristan Barksdale? Wishes to make Tristan Barksdale her PCP. Asks for you   to give her a call in regards to this. Thank you   ---------------------------------------------------------------------------  --------------  CALL BACK INFO  What is the best way for the office to contact you? OK to leave message on   voicemail  Preferred Call Back Phone Number? [phone]  ---------------------------------------------------------------------------  --------------  SCRIPT ANSWERS  Relationship to Patient?  Self

## 2021-10-06 ENCOUNTER — TELEPHONE (OUTPATIENT)
Dept: INTERNAL MEDICINE CLINIC | Age: 76
End: 2021-10-06

## 2021-10-06 NOTE — TELEPHONE ENCOUNTER
Called patient to let her know that Mark Blake has agreed to be her PCP. Scheduled patient with Haydee Escobar on 11/05/21. Instructed her to bring all medications to appt.

## 2021-10-28 DIAGNOSIS — E11.9 TYPE 2 DIABETES MELLITUS WITHOUT COMPLICATION, WITHOUT LONG-TERM CURRENT USE OF INSULIN (HCC): ICD-10-CM

## 2021-10-28 NOTE — TELEPHONE ENCOUNTER
Refill request for metformin medication.      [facility]       Last visit - 4-     Pending visit - 11-15-21    Last refill -4-1-2021      Medication Contract signed -   Herve kathleen-         Additional Comments

## 2021-11-15 ENCOUNTER — OFFICE VISIT (OUTPATIENT)
Dept: INTERNAL MEDICINE CLINIC | Age: 76
End: 2021-11-15
Payer: MEDICARE

## 2021-11-15 VITALS
HEART RATE: 76 BPM | SYSTOLIC BLOOD PRESSURE: 124 MMHG | OXYGEN SATURATION: 98 % | WEIGHT: 265 LBS | DIASTOLIC BLOOD PRESSURE: 70 MMHG | BODY MASS INDEX: 44.1 KG/M2

## 2021-11-15 DIAGNOSIS — I10 ESSENTIAL HYPERTENSION: ICD-10-CM

## 2021-11-15 DIAGNOSIS — E78.2 MIXED HYPERLIPIDEMIA: ICD-10-CM

## 2021-11-15 DIAGNOSIS — E11.9 TYPE 2 DIABETES MELLITUS WITHOUT COMPLICATION, WITHOUT LONG-TERM CURRENT USE OF INSULIN (HCC): Primary | ICD-10-CM

## 2021-11-15 DIAGNOSIS — F41.9 ANXIETY: ICD-10-CM

## 2021-11-15 DIAGNOSIS — R39.15 URINARY URGENCY: ICD-10-CM

## 2021-11-15 DIAGNOSIS — M17.0 BILATERAL PRIMARY OSTEOARTHRITIS OF KNEE: ICD-10-CM

## 2021-11-15 DIAGNOSIS — E03.9 ACQUIRED HYPOTHYROIDISM: ICD-10-CM

## 2021-11-15 LAB
BILIRUBIN, POC: NORMAL
BLOOD URINE, POC: NORMAL
CLARITY, POC: CLEAR
COLOR, POC: YELLOW
GLUCOSE URINE, POC: NORMAL
HBA1C MFR BLD: 6.8 %
KETONES, POC: NORMAL
LEUKOCYTE EST, POC: NORMAL
NITRITE, POC: NORMAL
PH, POC: 6
PROTEIN, POC: NORMAL
SPECIFIC GRAVITY, POC: 1.02
UROBILINOGEN, POC: NORMAL

## 2021-11-15 PROCEDURE — 83036 HEMOGLOBIN GLYCOSYLATED A1C: CPT | Performed by: NURSE PRACTITIONER

## 2021-11-15 PROCEDURE — 81002 URINALYSIS NONAUTO W/O SCOPE: CPT | Performed by: NURSE PRACTITIONER

## 2021-11-15 PROCEDURE — 99214 OFFICE O/P EST MOD 30 MIN: CPT | Performed by: NURSE PRACTITIONER

## 2021-11-15 ASSESSMENT — ENCOUNTER SYMPTOMS
SINUS PRESSURE: 0
FACIAL SWELLING: 0
VOMITING: 0
COUGH: 0
NAUSEA: 0
SORE THROAT: 0
DIARRHEA: 0
BACK PAIN: 1

## 2021-11-15 NOTE — PATIENT INSTRUCTIONS
> Decrease caffeine intake.      > Tylenol ES 2 pills and then Aleve 1-2 pills the next day    > Perform blood test January 2022

## 2021-11-15 NOTE — PROGRESS NOTES
Visit Medications    Medication Sig Taking? Authorizing Provider   metFORMIN (GLUCOPHAGE) 1000 MG tablet TAKE ONE TABLET BY MOUTH TWICE A DAY WITH MEALS Yes BASSAM Orr CNP   lisinopril-hydroCHLOROthiazide (PRINZIDE;ZESTORETIC) 20-12.5 MG per tablet Take two tablets by mouth daily Yes Sonam Tafoya MD   busPIRone (BUSPAR) 5 MG tablet TAKE ONE TABLET BY MOUTH TWICE A DAY AS NEEDED FOR ANXIETY Yes Sonam Tafoya MD   levothyroxine (SYNTHROID) 150 MCG tablet TAKE ONE TABLET BY MOUTH DAILY Yes Sonam Tafoya MD   glipiZIDE (GLUCOTROL XL) 5 MG extended release tablet TAKE TWO TABLETS BY MOUTH TWICE A DAY Yes Sonam Tafoya MD   ACCU-CHEK LIBBY PLUS strip USE TO TEST ONCE DAILY Yes Sonam Tafoya MD   FLUoxetine (PROZAC) 40 MG capsule TAKE ONE CAPSULE BY MOUTH DAILY Yes Sonam Tafoya MD   amLODIPine (NORVASC) 10 MG tablet TAKE ONE TABLET BY MOUTH DAILY Yes Sonam Tafoya MD   famotidine (PEPCID) 10 MG tablet Take 10 mg by mouth 2 times daily Yes Historical Provider, MD   Liraglutide (VICTOZA) 18 MG/3ML SOPN SC injection DIAL AND INJECT SUBCUTANEOUSLY 1.2 MG DAILY Yes Sonam Tafoya MD   Cetirizine HCl (ZYRTEC PO) Take by mouth 2 times daily 2 BID Yes Historical Provider, MD   clotrimazole-betamethasone (Murleen Valencia) 1-0.05 % cream  Yes Historical Provider, MD   Blood Glucose Monitoring Suppl (ACURA BLOOD GLUCOSE METER) W/DEVICE KIT Provide pt with glucometer under forumlary - ACCU-CHEK, Dx Type 2 DM, testing daily Yes BASSAM Sky CNP   Lancets MISC 1 po daily, please provide insurance formulary brand - ACCU-CHEK, Dx Type 2 DM, testing daily Yes BASSAM Sky CNP   Vitamin D (CHOLECALCIFEROL) 1000 UNITS CAPS capsule Take 1,000 Units by mouth daily. Yes Historical Provider, MD   calcium-vitamin D (OSCAL-500) 500-200 MG-UNIT per tablet Take 2 tablets by mouth daily. Yes Historical Provider, MD   aspirin 81 MG tablet Take 81 mg by mouth daily.  Yes Historical Provider, MD   Insulin Pen Needle (PEN NEEDLES) 32G X 4 MM MISC USE ONCE DAILY FOR Denise Ruffin MD     Family History   Problem Relation Age of Onset    Breast Cancer Maternal Aunt      Social History     Socioeconomic History    Marital status:      Spouse name: Not on file    Number of children: Not on file    Years of education: Not on file    Highest education level: Not on file   Occupational History    Not on file   Tobacco Use    Smoking status: Former Smoker     Packs/day: 1.00     Years: 15.00     Pack years: 15.00     Types: Cigarettes     Quit date: 1978     Years since quittin.9    Smokeless tobacco: Never Used   Substance and Sexual Activity    Alcohol use: No    Drug use: No    Sexual activity: Not Currently   Other Topics Concern    Not on file   Social History Narrative    Not on file     Social Determinants of Health     Financial Resource Strain: Low Risk     Difficulty of Paying Living Expenses: Not hard at all   Food Insecurity: No Food Insecurity    Worried About [address] in the Last Year: Never true    [address] [employer] in the Last Year: Never true   Transportation Needs:     Lack of Transportation (Medical): Not on file    Lack of Transportation (Non-Medical):  Not on file   Physical Activity:     Days of Exercise per Week: Not on file    Minutes of Exercise per Session: Not on file   Stress:     Feeling of Stress : Not on file   Social Connections:     Frequency of Communication with Friends and Family: Not on file    Frequency of Social Gatherings with Friends and Family: Not on file    Attends [employer] Services: Not on file    Active Member of Clubs or Organizations: Not on file    Attends Club or Organization Meetings: Not on file    Marital Status: Not on file   Intimate Partner Violence:     Fear of Current or Ex-Partner: Not on file    Emotionally Abused: Not on file    Physically Abused: Not on file    Sexually Abused: Not on file   Housing Stability:     Unable to Pay for Housing in the Last Year: Not on file    Number of Places Lived in the Last Year: Not on file    Unstable Housing in the Last Year: Not on file       Review of Systems   Constitutional: Negative for appetite change, chills, fatigue, fever and unexpected weight change. HENT: Negative for congestion, ear discharge, ear pain, facial swelling, hearing loss, sinus pressure, sneezing and sore throat. Respiratory: Negative for cough. Cardiovascular: Negative for chest pain. Gastrointestinal: Negative for diarrhea, nausea and vomiting. Genitourinary: Positive for urgency (Urinary incontinence). Negative for difficulty urinating, dysuria and hematuria. Musculoskeletal: Positive for back pain. Negative for arthralgias and gait problem. Bilateral Knee pain    Neurological: Negative for dizziness, weakness and headaches. Hematological: Negative for adenopathy. Psychiatric/Behavioral: Negative for sleep disturbance and suicidal ideas. Physical Exam  Constitutional:       General: She is not in acute distress. Appearance: She is well-developed. She is obese. She is not toxic-appearing. Neck:      Thyroid: No thyromegaly. Cardiovascular:      Rate and Rhythm: Normal rate and regular rhythm. Heart sounds: Normal heart sounds. Pulmonary:      Effort: Pulmonary effort is normal. No respiratory distress. Breath sounds: Normal breath sounds. No wheezing. Abdominal:      General: Bowel sounds are normal. There is no distension. Palpations: Abdomen is soft. There is no mass. Tenderness: There is no abdominal tenderness. There is no guarding or rebound. Musculoskeletal:         General: Normal range of motion. Skin:     General: Skin is warm and dry. Neurological:      Mental Status: She is alert and oriented to person, place, and time. Deep Tendon Reflexes: Reflexes are normal and symmetric.    Psychiatric:         Behavior: Behavior normal.         Thought Content: Thought content normal.             See above plan. Return in about 6 months (around 5/15/2022) for Medicare wellness, DM, A1C.     Kailee Robles, APRN - CNP

## 2021-11-16 LAB
CREATININE URINE: 145.6 MG/DL (ref 28–259)
MICROALBUMIN UR-MCNC: 17.9 MG/DL
MICROALBUMIN/CREAT UR-RTO: 122.9 MG/G (ref 0–30)

## 2021-12-10 ENCOUNTER — TELEPHONE (OUTPATIENT)
Dept: INTERNAL MEDICINE CLINIC | Age: 76
End: 2021-12-10

## 2021-12-10 NOTE — TELEPHONE ENCOUNTER
Refill request for Levothyroxine medication.      Name of [facility]      Last visit - 11/15/21     Pending visit - 5/16/22    Last refill -8/31/21      Medication Contract signed -   Last Oarrs ran-         Additional Comments

## 2021-12-12 RX ORDER — LEVOTHYROXINE SODIUM 0.15 MG/1
TABLET ORAL
Qty: 90 TABLET | Refills: 3 | Status: SHIPPED | OUTPATIENT
Start: 2021-12-12

## 2022-01-04 ENCOUNTER — PATIENT MESSAGE (OUTPATIENT)
Dept: INTERNAL MEDICINE CLINIC | Age: 77
End: 2022-01-04

## 2022-01-04 DIAGNOSIS — F32.A DEPRESSION, UNSPECIFIED DEPRESSION TYPE: ICD-10-CM

## 2022-01-04 DIAGNOSIS — I10 ESSENTIAL HYPERTENSION: ICD-10-CM

## 2022-01-04 RX ORDER — AMLODIPINE BESYLATE 10 MG/1
TABLET ORAL
Qty: 90 TABLET | Refills: 2 | Status: SHIPPED | OUTPATIENT
Start: 2022-01-04

## 2022-01-04 RX ORDER — FLUOXETINE HYDROCHLORIDE 40 MG/1
CAPSULE ORAL
Qty: 90 CAPSULE | Refills: 2 | Status: ON HOLD | OUTPATIENT
Start: 2022-01-04 | End: 2022-07-28 | Stop reason: SDUPTHER

## 2022-01-04 NOTE — TELEPHONE ENCOUNTER
Refill request for fluoxetine / amlodipine  medication. Name of Pharmacy- [facility]       Last visit - 11-     Pending visit - 5-16-22    Last refill - 4-1-2021 / 2-1-2021      Medication Contract signed -   Last Oarrs ran-         Additional Comments   From: Jada Flores  To: Kit Rosas  Sent: 1/4/2022  7:56 AM EST  Subject: Prescription refill    Hi Cong,    I need refills called in to [facility] for the following:    Fluoxetine   Amlodipine    Thanks for your help.     Kimberlee Groves [birth year]

## 2022-02-21 NOTE — PROGRESS NOTES
[address]  2/23/22  Referring: Dr. Hammond ref. provider found    REASON FOR CONSULT/CHIEF COMPLAINT/HPI     Reason for visit/ Chief complaint     Chief Complaint   Patient presents with    New Patient     Patient here today as NPV due to RBBB    Hypertension    Hyperlipidemia    Cardiac Clearance       HPI Jay Jay Sears is a 77 y.o. referred to our practice by Adonna Schaumann APRN for cardiac evaluation for a RBBB; her EKG also showed atrial fib. Her history includes hypertension, hyperlipidemia, hypothyroidism, & DM II. She had gastric bypass in 2002 lost 167# and has maintained. Today, she is actually here for pre-op risk assessment prior to right knee surgery (she needs the left done at some point as well). She is SOB with activity such as carrying groceries but this is not new. She has mild positional dizziness but also not new. No syncope. She denies exertional chest pain, orthopnea, PND, palpitations. Chronic mild ankle edema by the end of the day. She sleeps on a recliner because of her back not due to breathing. She states she does not know she is in atrial fib; she denies ever having a heart attack. She enjoys playing cards with friends every week. She is in atrial fibrillation on our EKG today, and was also in atrial fibrillation on last week's EKG. Additionally, she has Q waves in inferolateral leads concerning for a prior infarct. She had no idea she has ever had atrial fibrillation and doesn't remember ever having a heart attack. Patient is adherent with medications and is tolerating them well without side effects     HISTORY/ALLERGIES/ROS     MedHx:  has a past medical history of DJD (degenerative joint disease), Hyperlipidemia, Hypertension, Hypothyroidism, Morbid obesity due to excess calories ([address] and Type II or unspecified type diabetes mellitus without mention of complication, not stated as uncontrolled.   SurgHx:  has a past surgical history that includes Colonoscopy; knee surgery; Gastric bypass surgery; Hysterectomy, total abdominal; Cholecystectomy; Tubal ligation; Dilation and curettage of uterus; hernia repair; and Ovary removal.   SocHx:  reports that she quit smoking about 44 years ago. Her smoking use included cigarettes. She has a 15.00 pack-year smoking history. She has never used smokeless tobacco. She reports that she does not drink alcohol and does not use drugs. FamHx: No family history of premature coronary artery disease, sudden death, or aneurysm  Allergies: Patient has no known allergies. MEDICATIONS      Prior to Admission medications    Medication Sig Start Date End Date Taking?  Authorizing Provider   FLUoxetine (PROZAC) 40 MG capsule TAKE ONE CAPSULE BY MOUTH DAILY 1/4/22  Yes BASSAM Fierro CNP   amLODIPine (NORVASC) 10 MG tablet TAKE ONE TABLET BY MOUTH DAILY 1/4/22  Yes BASSAM Fierro CNP   levothyroxine (SYNTHROID) 150 MCG tablet TAKE ONE TABLET BY MOUTH DAILY 12/12/21  Yes BASSAM Fierro CNP   metFORMIN (GLUCOPHAGE) 1000 MG tablet TAKE ONE TABLET BY MOUTH TWICE A DAY WITH MEALS 10/28/21  Yes BASSAM Fierro CNP   lisinopril-hydroCHLOROthiazide (PRINZIDE;ZESTORETIC) 20-12.5 MG per tablet Take two tablets by mouth daily 9/1/21  Yes Otf Jones MD   busPIRone (BUSPAR) 5 MG tablet TAKE ONE TABLET BY MOUTH TWICE A DAY AS NEEDED FOR ANXIETY 8/31/21  Yes Otf Jones MD   glipiZIDE (GLUCOTROL XL) 5 MG extended release tablet TAKE TWO TABLETS BY MOUTH TWICE A DAY  Patient taking differently: TAKE ONE TABLET BY MOUTH DAILY 8/31/21  Yes Otf Jones MD   ACCU-CHEK LIBBY PLUS strip USE TO TEST ONCE DAILY 7/30/21  Yes Otf Jones MD   famotidine (PEPCID) 10 MG tablet Take 10 mg by mouth 2 times daily   Yes Historical Provider, MD   Liraglutide (VICTOZA) 18 MG/3ML SOPN SC injection DIAL AND INJECT SUBCUTANEOUSLY 1.2 MG DAILY 6/27/20  Yes Otf Jones MD   Cetirizine HCl (ZYRTEC PO) Take by mouth 2 times daily 2 BID Yes Historical Provider, MD   clotrimazole-betamethasone (LOTRISONE) 1-0.05 % cream  7/26/19  Yes Historical Provider, MD   Insulin Pen Needle (PEN NEEDLES) 32G X 4 MM MISC USE ONCE DAILY FOR VICTOZA 4/16/18  Yes Clara Robles MD   Blood Glucose Monitoring Suppl [facility] BLOOD GLUCOSE METER) W/DEVICE KIT Provide pt with glucometer under forumlary - ACCU-CHEK, Dx Type 2 DM, testing daily 3/2/16  Yes BASSAM Rocha CNP   Lancets MISC 1 po daily, please provide insurance formulary brand - ACCU-CHEK, Dx Type 2 DM, testing daily 3/2/16  Yes BASSAM Orr CNP   Vitamin D (CHOLECALCIFEROL) 1000 UNITS CAPS capsule Take 1,000 Units by mouth daily. Yes Historical Provider, MD   calcium-vitamin D (OSCAL-500) 500-200 MG-UNIT per tablet Take 2 tablets by mouth daily. Yes Historical Provider, MD   aspirin 81 MG tablet Take 81 mg by mouth daily. Yes Historical Provider, MD       PHYSICAL EXAM        Vitals:    02/23/22 0850   BP: (!) 142/80   Pulse: 71   Resp: 20   SpO2: 96%    Weight: 265 lb 14.4 oz (120.6 kg)       Body mass index is 45.64 kg/m².     Gen Alert, cooperative, no distress Heart  Regular rate and rhythm, no murmur   Head Normocephalic, atraumatic, no abnormalities Abd  Soft, NT, +BS, no mass, no OM   Eyes PERRLA, conj/corn clear Ext  Ext nl, AT, no C/C, right knee mild swelling otherwise no edema   Nose Nares normal, no drain age, Non-tender Pulse 2+ and symmetric   Throat Lips, mucosa, tongue normal Skin Color/text/turg nl, no rash/lesions   Neck S/S, TM, NT, no bruit Psych Nl mood and affect   Lung CTA-B, unlabored, no DTP     Ch wall NT, no deform       LABS and Imaging     Relevant and available CV data reviewed    EKG personally interpreted 2/23/22: Atrial fib 68 RBBB, inferolateral Q waves    EKG 2/16/22 ([facility] personally interpreted) - AF RBBB inferolateral Q waves    Lab Results   Component Value Date    CHOL 142 01/13/2021    TRIG 103 01/13/2021    HDL 64 01/13/2021    HDL 55 02/16/2012 [address] 01/13/2021    LABVLDL 21 01/13/2021     Echo 2012: Normal LV function, mild MR/SD    Stress: NA    Cath: NA    Holter: NA      HighRisk  HighComplexity/Medical Decision Making    Outside/Care everywhere records Reviewed  Labs Reviewed  Prior Imaging, ekg, reviewed when available  Medications reviewed  Old Notes reviewed  ASSESSMENT AND PLAN     1. Pre-op cardiovascular exam Risk Assessment  Right knee arthroplasty scheduled 3/2/22 with Dr. Nora Mcintosh at [facility]  She has active symptoms that are possibly cardiac and has a new diagnosis of AF  She is too high risk to be cleared for elective surgery at this time    Plan: Needs further cardiac testing before surgery    2. Atrial fib, new onset  EKG today 2/23/22> AF 76  Takes baby asa    [id number] Score for Atrial Fibrillation Stroke Risk   Risk   Factors  Component Value   C CHF No 0   H HTN Yes 1   A2 Age >= 76 Yes,  (70 y.o.) 2   D DM Yes 1   S2 Prior Stroke/TIA No 0   V Vascular Disease No 1   A Age 74-69 No,  (70 y.o.) 0   Sc Sex female 1    [id number]  Score  6   Score last updated 2/23/22 7:72 AM EST    Click here for a link to the UpToDate guideline \"Atrial Fibrillation: Anticoagulation therapy to prevent embolization    Disclaimer: Risk Score calculation is dependent on accuracy of patient problem list and past encounter diagnosis. Plan: Needs  48hr to determine AF burden, also baseline echo  Recommend anti-coagulation therapy - we started Eliquis 5mg bid today    3. ARITA / Anginal equivalent  Not new but ongoing  Mild LE edema at the end of the day    High likelihood that she has CAD. Additionally, she can't do an exercise stress test, and other imaging modalities would be unlikely to be diagnostic due to her body size and high likelihood of artifacts    Plan: Recommend LHC as a stress test would not be reliable at this point. Also needs an ECHO. I discussed risks, benefits, and alternatives of heart cath and possible PCI to the patient.  I discussed risks, incluiding risk of bleeding, infection, vascular injury, arrhythmia, myocardial infarction, stroke, and even death, and the patient would like to proceed with heart cath. 4. Hypertension  Stable  Blood pressure (!) 142/80, pulse 71, resp. rate 20, height 5' 4\" (1.626 m), weight 265 lb 14.4 oz (120.6 kg), SpO2 96%    Plan: Continue current medication regimen    5. Diabetes mellitus  Managed per PCP  A1c 68 11/2021  Sugars tend to run on the low side according to patient    Plan: Check A1c  Stop glipizide  Start Jardiance 10mg. Discussed cardiac benefit of starting flozin therapy  Continue other DM meds, follow-up with PCP    Patient counseled on lifestyle modification, diet, and exercise. Follow Up: Return in about 2 weeks (around 3/9/2022). [facility]  ECHO  BMP, CBC, lipids, A1c, PTT, PT/INR       Lisa Soares MD  Cardiologist [address]    Scribe's attestation: This note was scribed in the presence of Dr. Colonel Erika MD, by Francesca Pelayo RN. Physician Attestation  The scribe wrote this note in the presence of me Jamshid Hickman MD). The scribe may have prepopulated components of this note with my historical  intellectual property under my direct supervision. Any additions to this intellectual property were performed in my presence and at my direction. Furthermore, the content and accuracy of this note have been reviewed by me with edits by me as needed.   Jamshid Hickman MD 2/23/2022 11:25 AM

## 2022-02-23 ENCOUNTER — TELEPHONE (OUTPATIENT)
Dept: CARDIOLOGY CLINIC | Age: 77
End: 2022-02-23

## 2022-02-23 ENCOUNTER — OFFICE VISIT (OUTPATIENT)
Dept: CARDIOLOGY CLINIC | Age: 77
End: 2022-02-23
Payer: MEDICARE

## 2022-02-23 VITALS
OXYGEN SATURATION: 96 % | WEIGHT: 265.9 LBS | HEART RATE: 71 BPM | DIASTOLIC BLOOD PRESSURE: 80 MMHG | SYSTOLIC BLOOD PRESSURE: 142 MMHG | BODY MASS INDEX: 45.4 KG/M2 | HEIGHT: 64 IN | RESPIRATION RATE: 20 BRPM

## 2022-02-23 DIAGNOSIS — R06.09 DOE (DYSPNEA ON EXERTION): ICD-10-CM

## 2022-02-23 DIAGNOSIS — E78.2 MIXED HYPERLIPIDEMIA: ICD-10-CM

## 2022-02-23 DIAGNOSIS — E11.9 TYPE 2 DIABETES MELLITUS WITHOUT COMPLICATION, WITHOUT LONG-TERM CURRENT USE OF INSULIN (HCC): ICD-10-CM

## 2022-02-23 DIAGNOSIS — I45.10 RBBB (RIGHT BUNDLE BRANCH BLOCK): ICD-10-CM

## 2022-02-23 DIAGNOSIS — I20.8 ANGINAL EQUIVALENT (HCC): Primary | ICD-10-CM

## 2022-02-23 DIAGNOSIS — I10 BENIGN ESSENTIAL HTN: ICD-10-CM

## 2022-02-23 DIAGNOSIS — I48.91 ATRIAL FIBRILLATION, UNSPECIFIED TYPE (HCC): ICD-10-CM

## 2022-02-23 PROCEDURE — 93242 EXT ECG>48HR<7D RECORDING: CPT | Performed by: INTERNAL MEDICINE

## 2022-02-23 PROCEDURE — 99205 OFFICE O/P NEW HI 60 MIN: CPT | Performed by: INTERNAL MEDICINE

## 2022-02-23 PROCEDURE — 93000 ELECTROCARDIOGRAM COMPLETE: CPT | Performed by: INTERNAL MEDICINE

## 2022-02-23 RX ORDER — EMPAGLIFLOZIN 10 MG/1
1 TABLET, FILM COATED ORAL DAILY
Qty: 90 TABLET | Refills: 3 | Status: SHIPPED | OUTPATIENT
Start: 2022-02-23 | End: 2022-05-23 | Stop reason: SDUPTHER

## 2022-02-23 NOTE — PATIENT INSTRUCTIONS
1. Stop glipizide    2. Begin Jardiance 10mg every day tomorrow    3. Begin Eliquis 5mg twice a day -- can start today   --Check on your cost    4. Fasting lab work soon    5. Bring monitor back in 2 days    6. Cancel your surgery for now    7.  Feel free to call with any questions or concerns, [phone] Neto Maier)

## 2022-02-23 NOTE — TELEPHONE ENCOUNTER
I returned Elicia's call ([phone]). I had told Ms. Gonzalo Robles her daughter was more than welcome to call me so I could go over everything with her. We discussed her EKG, symptoms, reasons for the testing, and medication changes. She understood and will be sure to reiterate same to her mother. She will explain to her mother that if she should fall and hit her head, she needs to be checked out at the ER regardless of how she feels due to the elevated bleeding risk from the Eliquis. I will leave samples of Jardiance at the  so Ms. Gonzalo Robles can  at the  next week when she comes in for her ECHO.

## 2022-02-24 ENCOUNTER — HOSPITAL ENCOUNTER (OUTPATIENT)
Age: 77
Discharge: HOME | End: 2022-02-24
Payer: MEDICARE

## 2022-02-24 DIAGNOSIS — E78.2 MIXED HYPERLIPIDEMIA: ICD-10-CM

## 2022-02-24 DIAGNOSIS — R06.09 DOE (DYSPNEA ON EXERTION): ICD-10-CM

## 2022-02-24 DIAGNOSIS — I20.8 ANGINAL EQUIVALENT (HCC): ICD-10-CM

## 2022-02-24 DIAGNOSIS — E11.9 TYPE 2 DIABETES MELLITUS WITHOUT COMPLICATION, WITHOUT LONG-TERM CURRENT USE OF INSULIN (HCC): ICD-10-CM

## 2022-02-24 DIAGNOSIS — I48.91 ATRIAL FIBRILLATION, UNSPECIFIED TYPE (HCC): ICD-10-CM

## 2022-02-24 LAB
ANION GAP SERPL CALCULATED.3IONS-SCNC: 12 MMOL/L (ref 3–16)
APTT: 39.3 SEC (ref 26.2–38.6)
BASOPHILS ABSOLUTE: 0.1 K/UL (ref 0–0.2)
BASOPHILS RELATIVE PERCENT: 1.4 %
BUN BLDV-MCNC: 9 MG/DL (ref 7–20)
CALCIUM SERPL-MCNC: 9.4 MG/DL (ref 8.3–10.6)
CHLORIDE BLD-SCNC: 102 MMOL/L (ref 99–110)
CHOLESTEROL, TOTAL: 216 MG/DL (ref 0–199)
CO2: 28 MMOL/L (ref 21–32)
CREAT SERPL-MCNC: <0.5 MG/DL (ref 0.6–1.2)
EOSINOPHILS ABSOLUTE: 0.2 K/UL (ref 0–0.6)
EOSINOPHILS RELATIVE PERCENT: 3 %
GFR AFRICAN AMERICAN: >60
GFR NON-AFRICAN AMERICAN: >60
GLUCOSE BLD-MCNC: 148 MG/DL (ref 70–99)
HCT VFR BLD CALC: 43.2 % (ref 36–48)
HDLC SERPL-MCNC: 82 MG/DL (ref 40–60)
HEMOGLOBIN: 14.3 G/DL (ref 12–16)
INR BLD: 1 (ref 0.88–1.12)
LDL CHOLESTEROL CALCULATED: 112 MG/DL
LYMPHOCYTES ABSOLUTE: 0.9 K/UL (ref 1–5.1)
LYMPHOCYTES RELATIVE PERCENT: 15.6 %
MCH RBC QN AUTO: 30 PG (ref 26–34)
MCHC RBC AUTO-ENTMCNC: 33.1 G/DL (ref 31–36)
MCV RBC AUTO: 90.5 FL (ref 80–100)
MONOCYTES ABSOLUTE: 0.5 K/UL (ref 0–1.3)
MONOCYTES RELATIVE PERCENT: 8.8 %
NEUTROPHILS ABSOLUTE: 3.9 K/UL (ref 1.7–7.7)
NEUTROPHILS RELATIVE PERCENT: 71.2 %
PDW BLD-RTO: 15.5 % (ref 12.4–15.4)
PLATELET # BLD: 209 K/UL (ref 135–450)
PMV BLD AUTO: 10.6 FL (ref 5–10.5)
POTASSIUM SERPL-SCNC: 4.4 MMOL/L (ref 3.5–5.1)
PROTHROMBIN TIME: 11.3 SEC (ref 9.9–12.7)
RBC # BLD: 4.77 M/UL (ref 4–5.2)
SODIUM BLD-SCNC: 142 MMOL/L (ref 136–145)
TRIGL SERPL-MCNC: 109 MG/DL (ref 0–150)
VLDLC SERPL CALC-MCNC: 22 MG/DL
WBC # BLD: 5.5 K/UL (ref 4–11)

## 2022-02-24 PROCEDURE — 36415 COLL VENOUS BLD VENIPUNCTURE: CPT

## 2022-02-24 PROCEDURE — 83036 HEMOGLOBIN GLYCOSYLATED A1C: CPT

## 2022-02-24 PROCEDURE — 85610 PROTHROMBIN TIME: CPT

## 2022-02-24 PROCEDURE — 80048 BASIC METABOLIC PNL TOTAL CA: CPT

## 2022-02-24 PROCEDURE — 85730 THROMBOPLASTIN TIME PARTIAL: CPT

## 2022-02-24 PROCEDURE — 85025 COMPLETE CBC W/AUTO DIFF WBC: CPT

## 2022-02-24 PROCEDURE — 80061 LIPID PANEL: CPT

## 2022-02-25 ENCOUNTER — TELEPHONE (OUTPATIENT)
Dept: CARDIOLOGY CLINIC | Age: 77
End: 2022-02-25

## 2022-02-25 LAB
ESTIMATED AVERAGE GLUCOSE: 145.6 MG/DL
HBA1C MFR BLD: 6.7 %

## 2022-02-25 NOTE — TELEPHONE ENCOUNTER
Called and spoke with patient and informed her of message below. She stated that she just took her first tablet today. So far so good.

## 2022-02-25 NOTE — TELEPHONE ENCOUNTER
----- Message from Margarette Puente MD sent at 2/25/2022  4:08 PM EST -----  Diabetes labs are stable  See if she is doing ok on the Jardiance we just started. Thanks.

## 2022-03-01 ENCOUNTER — HOSPITAL ENCOUNTER (OUTPATIENT)
Dept: CARDIOLOGY | Age: 77
Discharge: HOME | End: 2022-03-01
Payer: MEDICARE

## 2022-03-01 DIAGNOSIS — R06.09 DOE (DYSPNEA ON EXERTION): ICD-10-CM

## 2022-03-01 DIAGNOSIS — I20.8 ANGINAL EQUIVALENT (HCC): ICD-10-CM

## 2022-03-01 DIAGNOSIS — I10 BENIGN ESSENTIAL HTN: ICD-10-CM

## 2022-03-01 LAB
LV EF: 55 %
LVEF MODALITY: NORMAL

## 2022-03-01 PROCEDURE — 93306 TTE W/DOPPLER COMPLETE: CPT

## 2022-03-01 PROCEDURE — 93244 EXT ECG>48HR<7D REV&INTERPJ: CPT | Performed by: INTERNAL MEDICINE

## 2022-03-04 ENCOUNTER — HOSPITAL ENCOUNTER (OUTPATIENT)
Dept: CARDIAC CATH/INVASIVE PROCEDURES | Age: 77
Discharge: HOME | End: 2022-03-04
Attending: INTERNAL MEDICINE | Admitting: INTERNAL MEDICINE
Payer: MEDICARE

## 2022-03-04 VITALS
DIASTOLIC BLOOD PRESSURE: 90 MMHG | SYSTOLIC BLOOD PRESSURE: 168 MMHG | HEIGHT: 66 IN | HEART RATE: 86 BPM | BODY MASS INDEX: 42.27 KG/M2 | RESPIRATION RATE: 18 BRPM | WEIGHT: 263 LBS | TEMPERATURE: 97.9 F | OXYGEN SATURATION: 96 %

## 2022-03-04 PROBLEM — R07.2 PRECORDIAL PAIN: Status: ACTIVE | Noted: 2022-03-04

## 2022-03-04 LAB
EKG ATRIAL RATE: 115 BPM
EKG DIAGNOSIS: NORMAL
EKG Q-T INTERVAL: 414 MS
EKG QRS DURATION: 114 MS
EKG QTC CALCULATION (BAZETT): 495 MS
EKG R AXIS: -48 DEGREES
EKG T AXIS: 20 DEGREES
EKG VENTRICULAR RATE: 86 BPM
LEFT VENTRICULAR EJECTION FRACTION MODE: NORMAL
LV EF: 55 %

## 2022-03-04 PROCEDURE — 93458 L HRT ARTERY/VENTRICLE ANGIO: CPT

## 2022-03-04 PROCEDURE — 99152 MOD SED SAME PHYS/QHP 5/>YRS: CPT

## 2022-03-04 PROCEDURE — 99152 MOD SED SAME PHYS/QHP 5/>YRS: CPT | Performed by: INTERNAL MEDICINE

## 2022-03-04 PROCEDURE — 93005 ELECTROCARDIOGRAM TRACING: CPT | Performed by: INTERNAL MEDICINE

## 2022-03-04 PROCEDURE — 93010 ELECTROCARDIOGRAM REPORT: CPT | Performed by: INTERNAL MEDICINE

## 2022-03-04 PROCEDURE — 6360000004 HC RX CONTRAST MEDICATION: Performed by: INTERNAL MEDICINE

## 2022-03-04 PROCEDURE — 2500000003 HC RX 250 WO HCPCS

## 2022-03-04 PROCEDURE — 93458 L HRT ARTERY/VENTRICLE ANGIO: CPT | Performed by: INTERNAL MEDICINE

## 2022-03-04 PROCEDURE — C1894 INTRO/SHEATH, NON-LASER: HCPCS

## 2022-03-04 PROCEDURE — 6360000002 HC RX W HCPCS

## 2022-03-04 PROCEDURE — 99153 MOD SED SAME PHYS/QHP EA: CPT

## 2022-03-04 PROCEDURE — C1769 GUIDE WIRE: HCPCS

## 2022-03-04 PROCEDURE — 2709999900 HC NON-CHARGEABLE SUPPLY

## 2022-03-04 RX ORDER — SODIUM CHLORIDE 0.9 % (FLUSH) 0.9 %
5-40 SYRINGE (ML) INJECTION EVERY 12 HOURS SCHEDULED
Status: DISCONTINUED | OUTPATIENT
Start: 2022-03-04 | End: 2022-03-04 | Stop reason: HOSPADM

## 2022-03-04 RX ORDER — SODIUM CHLORIDE 9 MG/ML
25 INJECTION, SOLUTION INTRAVENOUS PRN
Status: DISCONTINUED | OUTPATIENT
Start: 2022-03-04 | End: 2022-03-04 | Stop reason: HOSPADM

## 2022-03-04 RX ORDER — SIMVASTATIN 20 MG
20 TABLET ORAL NIGHTLY
COMMUNITY

## 2022-03-04 RX ORDER — ACETAMINOPHEN 325 MG/1
650 TABLET ORAL EVERY 4 HOURS PRN
Status: DISCONTINUED | OUTPATIENT
Start: 2022-03-04 | End: 2022-03-04 | Stop reason: HOSPADM

## 2022-03-04 RX ORDER — SODIUM CHLORIDE 9 MG/ML
INJECTION, SOLUTION INTRAVENOUS CONTINUOUS
Status: DISCONTINUED | OUTPATIENT
Start: 2022-03-04 | End: 2022-03-04 | Stop reason: HOSPADM

## 2022-03-04 RX ORDER — SODIUM CHLORIDE 0.9 % (FLUSH) 0.9 %
5-40 SYRINGE (ML) INJECTION PRN
Status: DISCONTINUED | OUTPATIENT
Start: 2022-03-04 | End: 2022-03-04 | Stop reason: HOSPADM

## 2022-03-04 RX ORDER — ONDANSETRON 2 MG/ML
4 INJECTION INTRAMUSCULAR; INTRAVENOUS EVERY 6 HOURS PRN
Status: DISCONTINUED | OUTPATIENT
Start: 2022-03-04 | End: 2022-03-04 | Stop reason: HOSPADM

## 2022-03-04 RX ADMIN — IOPAMIDOL 83 ML: 755 INJECTION, SOLUTION INTRAVENOUS at 12:53

## 2022-03-04 NOTE — PRE SEDATION
Brief Pre-Op Note/Sedation Assessment      Elizabeth Farfan  [birth year]  [id number]  12:18 PM    Planned Procedure: Cardiac Catheterization Procedure  Post Procedure Plan: Return to same level of care  Consent: I have discussed with the patient and/or the patient representative the indication, alternatives, and the possible risks and/or complications of the planned procedure and the anesthesia methods. The patient and/or patient representative appear to understand and agree to proceed. Chief Complaint:   Chest Pain/Pressure  Anginal Equivalent  Dyspnea on Exertion  Pre-Op Clearance      Indications for Cath Procedure:  1. Presentation:  Suspected CAD, Cardiac Arrythmia and Pre-Operative Evaluation - Surgery Type: Non-Cardiac Surgery, Functional Capacity: Unknown, Surgical Risk: N/A, Solid Organ Transplant Surgery:  No  2. Anginal Classification within 2 weeks:  CCS II - Slight limitation, with angina only during vigorous physical activity  3. Angina Symptoms Assessment:  Atypical Chest Pain  4. Heart Failure Class within last 2 weeks:  No symptoms  5. Cardiovascular Instability:  No    Prior Ischemic Workup/Eval:  1. Pre-Procedural Medications: Yes: Aspirin, Ca Channel Blockers and STATIN  2. Stress Test Completed? No    Does Patient need surgery?   Cath Valve Surgery:  No    Pre-Procedure Medical History:  Vital Signs:  BP (!) 168/90   Pulse 86   Temp 97.9 °F (36.6 °C) (Temporal)   Resp 18   Ht 5' 6\" (1.676 m)   Wt 263 lb (119.3 kg)   SpO2 96%   BMI 42.45 kg/m²     Allergies:  No Known Allergies  Medications:    Current Facility-Administered Medications   Medication Dose Route Frequency Provider Last Rate Last Admin    0.9 % sodium chloride infusion   IntraVENous Continuous Oanh Valencia MD        sodium chloride flush 0.9 % injection 5-40 mL  5-40 mL IntraVENous 2 times per day Oanh Valencia MD        sodium chloride flush 0.9 % injection 5-40 mL  5-40 mL IntraVENous PRN Frutoso Paul Amaris Tesfaye MD        0.9 % sodium chloride infusion  25 mL IntraVENous PRN Sonya Marrero MD           Past Medical History:    Past Medical History:   Diagnosis Date    DJD (degenerative joint disease)     Hyperlipidemia     Hypertension     Hypothyroidism     Morbid obesity due to excess calories ([facility] [address] 2/11/2016    Type II or unspecified type diabetes mellitus without mention of complication, not stated as uncontrolled        Surgical History:    Past Surgical History:   Procedure Laterality Date    CHOLECYSTECTOMY      COLONOSCOPY      DILATION AND CURETTAGE OF UTERUS      GASTRIC BYPASS SURGERY      HERNIA REPAIR      HYSTERECTOMY, TOTAL ABDOMINAL      KNEE SURGERY      OVARY REMOVAL      TUBAL LIGATION               Pre-Sedation:  Pre-Sedation Documentation and Exam:  I have assessed the patient and reviewed the H&P on the chart. Prior History of Anesthesia Complications:   none    Modified Mallampati:  III (soft palate, base of uvula visible)    ASA Classification:  Class 3 - A patient with severe systemic disease that limits activity but is not incapacitating    Bella Scale: Activity:  2 - Able to move 4 extremities voluntarily on command  Respiration:  2 - Able to breathe deeply and cough freely  Circulation:  2 - BP+/- 20mmHg of normal  Consciousness:  2 - Fully awake  Oxygen Saturation (color):  2 - Able to maintain oxygen saturation >92% on room air    Sedation/Anesthesia Plan:  Guard the patient's safety and welfare. Minimize physical discomfort and pain. Minimize negative psychological responses to treatment by providing sedation and analgesia and maximize the potential amnesia. Patient to meet pre-procedure discharge plan.     Medication Planned:  midazolam intravenously and fentanyl intravenously    Patient is an appropriate candidate for plan of sedation:   yes      Electronically signed by Sonya Marrero MD on 3/4/2022 at 12:18 PM

## 2022-03-04 NOTE — H&P
that includes Colonoscopy; knee surgery; Gastric bypass surgery; Hysterectomy, total abdominal; Cholecystectomy; Tubal ligation; Dilation and curettage of uterus; hernia repair; and Ovary removal.   SocHx:             reports that she quit smoking about 44 years ago. Her smoking use included cigarettes. She has a 15.00 pack-year smoking history. She has never used smokeless tobacco. She reports that she does not drink alcohol and does not use drugs. FamHx:           No family history of premature coronary artery disease, sudden death, or aneurysm  Allergies:        Patient has no known allergies.         MEDICATIONS              Prior to Admission medications    Medication Sig Start Date End Date Taking?  Authorizing Provider   FLUoxetine (PROZAC) 40 MG capsule TAKE ONE CAPSULE BY MOUTH DAILY 1/4/22   Yes BASSAM Mcallister CNP   amLODIPine (NORVASC) 10 MG tablet TAKE ONE TABLET BY MOUTH DAILY 1/4/22   Yes BASSAM Mcallister CNP   levothyroxine (SYNTHROID) 150 MCG tablet TAKE ONE TABLET BY MOUTH DAILY 12/12/21   Yes BASSAM Mcallister CNP   metFORMIN (GLUCOPHAGE) 1000 MG tablet TAKE ONE TABLET BY MOUTH TWICE A DAY WITH MEALS 10/28/21   Yes BASSAM Mcallister CNP   lisinopril-hydroCHLOROthiazide (PRINZIDE;ZESTORETIC) 20-12.5 MG per tablet Take two tablets by mouth daily 9/1/21   Yes Leetta Gottron, MD   busPIRone (BUSPAR) 5 MG tablet TAKE ONE TABLET BY MOUTH TWICE A DAY AS NEEDED FOR ANXIETY 8/31/21   Yes Leetta Gottron, MD   glipiZIDE (GLUCOTROL XL) 5 MG extended release tablet TAKE TWO TABLETS BY MOUTH TWICE A DAY  Patient taking differently: TAKE ONE TABLET BY MOUTH DAILY 8/31/21   Yes Leetta Gottron, MD   ACCU-CHEK LIBBY PLUS strip USE TO TEST ONCE DAILY 7/30/21   Yes Leetta Gottron, MD   famotidine (PEPCID) 10 MG tablet Take 10 mg by mouth 2 times daily     Yes Historical Provider, MD   Liraglutide (VICTOZA) 18 MG/3ML SOPN SC injection DIAL AND INJECT SUBCUTANEOUSLY 1.2 MG DAILY 6/27/20   Yes [employer] MD Henry   Cetirizine HCl (ZYRTEC PO) Take by mouth 2 times daily 2 BID     Yes Historical Provider, MD   clotrimazole-betamethasone (LOTRISONE) 1-0.05 % cream   7/26/19   Yes Historical Provider, MD   Insulin Pen Needle (PEN NEEDLES) 32G X 4 MM MISC USE ONCE DAILY FOR VICTOZA 4/16/18   Yes Amaya Danielson MD   Blood Glucose Monitoring Suppl [facility] BLOOD GLUCOSE METER) W/DEVICE KIT Provide pt with glucometer under forumlary - ACCU-CHEK, Dx Type 2 DM, testing daily 3/2/16   Yes BASSAM Romo CNP   Lancets MISC 1 po daily, please provide insurance formulary brand - ACCU-CHEK, Dx Type 2 DM, testing daily 3/2/16   Yes BASSAM Romo CNP   Vitamin D (CHOLECALCIFEROL) 1000 UNITS CAPS capsule Take 1,000 Units by mouth daily.     Yes Historical Provider, MD   calcium-vitamin D (OSCAL-500) 500-200 MG-UNIT per tablet Take 2 tablets by mouth daily.     Yes Historical Provider, MD   aspirin 81 MG tablet Take 81 mg by mouth daily.     Yes Historical Provider, MD         PHYSICAL EXAM            Vitals:     02/23/22 0850   BP: (!) 142/80   Pulse: 71   Resp: 20   SpO2: 96%    Weight: 265 lb 14.4 oz (120.6 kg)        Body mass index is 45.64 kg/m².     Gen Alert, cooperative, no distress Heart  Regular rate and rhythm, no murmur   Head Normocephalic, atraumatic, no abnormalities Abd  Soft, NT, +BS, no mass, no OM   Eyes PERRLA, conj/corn clear Ext  Ext nl, AT, no C/C, right knee mild swelling otherwise no edema   Nose Nares normal, no drain age, Non-tender Pulse 2+ and symmetric   Throat Lips, mucosa, tongue normal Skin Color/text/turg nl, no rash/lesions   Neck S/S, TM, NT, no bruit Psych Nl mood and affect   Lung CTA-B, unlabored, no DTP       Ch wall NT, no deform          LABS and Imaging      Relevant and available CV data reviewed     EKG personally interpreted 2/23/22: Atrial fib 68 RBBB, inferolateral Q waves     EKG 2/16/22 ([facility] personally interpreted) - AF RBBB inferolateral Q waves           Lab Results   Component Value Date     CHOL 142 01/13/2021     TRIG 103 01/13/2021     HDL 64 01/13/2021     HDL 55 02/16/2012     LDLCALC 57 01/13/2021     LABVLDL 21 01/13/2021      Echo 2012: Normal LV function, mild MR/PA     Stress: NA     Cath: NA     Holter: NA        HighRisk  HighComplexity/Medical Decision Making     Outside/Care everywhere records Reviewed  Labs Reviewed  Prior Imaging, ekg, reviewed when available  Medications reviewed  Old Notes reviewed  ASSESSMENT AND PLAN      1. Pre-op cardiovascular exam Risk Assessment  Right knee arthroplasty scheduled 3/2/22 with Dr. Mina Peng at [facility]  She has active symptoms that are possibly cardiac and has a new diagnosis of AF  She is too high risk to be cleared for elective surgery at this time     Plan: Needs further cardiac testing before surgery     2. Atrial fib, new onset  EKG today 2/23/22> AF 77  Takes baby asa     [id number] Score for Atrial Fibrillation Stroke Risk    Risk   Factors   Component Value   C CHF No 0   H HTN Yes 1   A2 Age >= 76 Yes,  (70 y.o.) 2   D DM Yes 1   S2 Prior Stroke/TIA No 0   V Vascular Disease No 1   A Age 74-69 No,  (70 y.o.) 0   Sc Sex female 1     [id number]  Score   6   Score last updated 2/23/22 6:05 AM EST     Click here for a link to the UpToDate guideline \"Atrial Fibrillation: Anticoagulation therapy to prevent embolization     Disclaimer: Risk Score calculation is dependent on accuracy of patient problem list and past encounter diagnosis.     Plan: Needs HM 48hr to determine AF burden, also baseline echo  Recommend anti-coagulation therapy - we started Eliquis 5mg bid today     3. ARITA / Anginal equivalent  Not new but ongoing  Mild LE edema at the end of the day     High likelihood that she has CAD.   Additionally, she can't do an exercise stress test, and other imaging modalities would be unlikely to be diagnostic due to her body size and high likelihood of artifacts     Plan: Recommend LHC as a stress test would not be reliable at this point. Also needs an ECHO.     I discussed risks, benefits, and alternatives of heart cath and possible PCI to the patient. I discussed risks, incluiding risk of bleeding, infection, vascular injury, arrhythmia, myocardial infarction, stroke, and even death, and the patient would like to proceed with heart cath.              4. Hypertension  Stable  Blood pressure (!) 142/80, pulse 71, resp. rate 20, height 5' 4\" (1.626 m), weight 265 lb 14.4 oz (120.6 kg), SpO2 96%     Plan: Continue current medication regimen     5. Diabetes mellitus  Managed per PCP  A1c 68 11/2021  Sugars tend to run on the low side according to patient     Plan: Check A1c  Stop glipizide  Start Jardiance 10mg. Discussed cardiac benefit of starting flozin therapy  Continue other DM meds, follow-up with PCP     Patient counseled on lifestyle modification, diet, and exercise.     Follow Up: Return in about 2 weeks (around 3/9/2022). [facility]  ECHO  BMP, CBC, lipids, A1c, PTT, PT/INR         Greg Jamison MD  Cardiologist [facility]     Scribe's attestation: This note was scribed in the presence of Dr. Karen Loyola MD, by Chivo Daniels RN.     Physician Attestation  The scribe wrote this note in the presence of me Sarika Joel MD). The scribe may have prepopulated components of this note with my historical  intellectual property under my direct supervision. Any additions to this intellectual property were performed in my presence and at my direction. Furthermore, the content and accuracy of this note have been reviewed by me with edits by me as needed. Sarika Joel MD 2/23/2022 11:25 AM    Patient seen and examined. Chart reviewed. No changes to H and P. Cardiac cath today.     Phill Byrnes MD

## 2022-03-04 NOTE — BRIEF OP NOTE
Cardiac Cath 3/4/2022:  Access: Right RA  Ultrasound: Ultrasound guidance used to determine after mentioned artery patency, size (> 2 mm), anatomic variations and ideal puncture location. Real-time ultrasound utilized concurrent with vascular needle entry into the artery. Images permanently recorded and reported in the patient chart. Hemostasis: TR band    Moderate Sedation:  Start time: 1209  Stop time: 1244  2 mg versed   100 mcg fentanyl   An independent trained observer pushed medications at my direction. We monitored the patient's level of consciousness and vital signs/physiologic status throughout the procedure duration (see start and start times above). Bleeding risk: Low  LVEDP: 26 mmHg  AO: 155/87 mmHg  Estimated blood loss: Less than 20 mL  Contrast: 83 mL  Fluoroscopy time: 4.3 min. Anatomy:   LM-normal  LAD-40% mid  Cx-60% distal  OM1-normal  RCA-30% proximal, dominant  LVEF-55%  Note: Patient did develop transient bradycardia after entering the left ventricle of the pigtail catheter. This was likely related to stunning of the left bundle. Patient remained hemodynamically stable and bradycardia subsequently resolved. Impression:  1. 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 2.  Moderate nonobstructive CAD involving the circumflex. 3.  Normal LV systolic function. 4.  Elevated LVEDP. Plan:  1. Medical management. 2.  May proceed with planned knee surgery. 3.  Follow-up with Dr. Mariola Garcia in 1 to 2 weeks.

## 2022-03-04 NOTE — PROGRESS NOTES
[address]  3/11/22  Referring: Dr. Hammond ref. provider found    REASON FOR CONSULT/CHIEF COMPLAINT/HPI     Reason for visit/ Chief complaint     Preop evaluation, discuss cath / Holter results      HPI Maddy Zhang is a 77 y.o. referred to our practice by Devante BANEGAS for cardiac evaluation for a RBBB; her EKG also showed atrial fib. She was initially referred for preop evaluation for knee surgery. At that time, she had significant symptoms and hadn't had much work-up, so we asked her to posptone her surgery pending testing. She had an echo that was reassuring, a [facility] with moderate CAD not requiring stenting, and a Holter to document her heart rate trends since she is on no rate control drugs    Today she feels about the same as last visit. She gets winded easily, which she attributes to her weight. She notes that she lives near [facility] and would prefer to find cardiology closer to home. Patient is adherent with medications and is tolerating them well without side effects     HISTORY/ALLERGIES/ROS     MedHx:  has a past medical history of DJD (degenerative joint disease), Hyperlipidemia, Hypertension, Hypothyroidism, Morbid obesity due to excess calories ([address] and Type II or unspecified type diabetes mellitus without mention of complication, not stated as uncontrolled. SurgHx:  has a past surgical history that includes Colonoscopy; knee surgery; Gastric bypass surgery; Hysterectomy, total abdominal; Cholecystectomy; Tubal ligation; Dilation and curettage of uterus; hernia repair; and Ovary removal.   SocHx:  reports that she quit smoking about 44 years ago. Her smoking use included cigarettes. She has a 15.00 pack-year smoking history. She has never used smokeless tobacco. She reports that she does not drink alcohol and does not use drugs. FamHx: No family history of premature coronary artery disease, sudden death, or aneurysm  Allergies: Patient has no known allergies. MEDICATIONS      Prior to Admission medications    Medication Sig Start Date End Date Taking?  Authorizing Provider   FLUoxetine (PROZAC) 40 MG capsule TAKE ONE CAPSULE BY MOUTH DAILY 1/4/22  Yes Jennifer Standard, APRN - CNP   amLODIPine (NORVASC) 10 MG tablet TAKE ONE TABLET BY MOUTH DAILY 1/4/22  Yes Jennifer Standard, APRN - CNP   levothyroxine (SYNTHROID) 150 MCG tablet TAKE ONE TABLET BY MOUTH DAILY 12/12/21  Yes Jennifer Standard, APRN - CNP   metFORMIN (GLUCOPHAGE) 1000 MG tablet TAKE ONE TABLET BY MOUTH TWICE A DAY WITH MEALS 10/28/21  Yes Jennifer Standard, APRN - CNP   lisinopril-hydroCHLOROthiazide (PRINZIDE;ZESTORETIC) 20-12.5 MG per tablet Take two tablets by mouth daily 9/1/21  Yes Lisa Rowland MD   busPIRone (BUSPAR) 5 MG tablet TAKE ONE TABLET BY MOUTH TWICE A DAY AS NEEDED FOR ANXIETY 8/31/21  Yes Lisa Rowland MD   glipiZIDE (GLUCOTROL XL) 5 MG extended release tablet TAKE TWO TABLETS BY MOUTH TWICE A DAY  Patient taking differently: TAKE ONE TABLET BY MOUTH DAILY 8/31/21  Yes Lisa Rowland MD   ACCU-CHEK LIBBY PLUS strip USE TO TEST ONCE DAILY 7/30/21  Yes Lisa Rowland MD   famotidine (PEPCID) 10 MG tablet Take 10 mg by mouth 2 times daily   Yes Historical Provider, MD   Liraglutide (VICTOZA) 18 MG/3ML SOPN SC injection DIAL AND INJECT SUBCUTANEOUSLY 1.2 MG DAILY 6/27/20  Yes Lisa Rowland MD   Cetirizine HCl (ZYRTEC PO) Take by mouth 2 times daily 2 BID   Yes Historical Provider, MD   clotrimazole-betamethasone (Claire Palacios) 1-0.05 % cream  7/26/19  Yes Historical Provider, MD   Insulin Pen Needle (PEN NEEDLES) 32G X 4 MM MISC USE ONCE DAILY FOR VICTOZA 4/16/18  Yes Stanley Washington MD   Blood Glucose Monitoring Suppl (ACURA BLOOD GLUCOSE METER) W/DEVICE KIT Provide pt with glucometer under forumlary - ACCU-CHEK, Dx Type 2 DM, testing daily 3/2/16  Yes Jennifer Standard, APRN - CNP   Lancets MISC 1 po daily, please provide insurance formulary brand - ACCU-CHEK, Dx Type 2 DM, testing daily 3/2/16 Yes Oscar Solis APRN - CNP   Vitamin D (CHOLECALCIFEROL) 1000 UNITS CAPS capsule Take 1,000 Units by mouth daily. Yes Historical Provider, MD   calcium-vitamin D (OSCAL-500) 500-200 MG-UNIT per tablet Take 2 tablets by mouth daily. Yes Historical Provider, MD   aspirin 81 MG tablet Take 81 mg by mouth daily. Yes Historical Provider, MD       PHYSICAL EXAM        Vitals:    03/11/22 0911   BP: 120/70   Pulse: 80   Resp: 18   SpO2: 97%    Weight: 263 lb 6.4 oz (119.5 kg)       Body mass index is 43.83 kg/m². Gen Alert, cooperative, no distress. Morbidly obese. Heart  Irregular rate and rhythm, no murmur   Head Normocephalic, atraumatic, no abnormalities Abd  Soft, NT, +BS, no mass, no OM   Eyes PERRLA, conj/corn clear Ext  Ext nl, AT, no C/C, right knee mild swelling otherwise no edema   Nose Nares normal, no drain age, Non-tender Pulse 2+ and symmetric   Throat Lips, mucosa, tongue normal Skin Color/text/turg nl, no rash/lesions   Neck S/S, TM, NT, no bruit Psych Nl mood and affect   Lung CTA-B, unlabored, no DTP     Ch wall NT, no deform       LABS and Imaging     Relevant and available CV data reviewed    EKG personally interpreted 2/23/22: Atrial fib 68 RBBB, inferolateral Q waves    EKG 2/16/22 (OS[facility] personally interpreted) - AF RBBB inferolateral Q waves    Lab Results   Component Value Date    CHOL 216 02/24/2022    TRIG 109 02/24/2022    HDL 82 02/24/2022    HDL 55 02/16/2012    LDLCALC 112 02/24/2022    LABVLDL 22 02/24/2022     Echo 3/1/2022  Left ventricular systolic function is normal with a visually estimated ejection fraction of 55%. The left ventricle is normal in size with moderate concentric hypertrophy. No obvious regional wall motion abnormalities noted. Elevated left atrial pressures. The right ventricle is mildly dilated with impaired systolic function. The left atrium appears moderately enlarged. The right atrium is severely enlarged.   Systolic pulmonary artery pressure (SPAP) is normal and estimated at 33 mm of Hg with elevated right atrial pressures (8 mmHg) . Stress: NA    Cath: 3/4/2022  LM-normal  LAD-40% mid  Cx-60% distal  OM1-normal  RCA-30% proximal, dominant  LVEF-55%  Note: Patient did develop transient bradycardia after entering the left ventricle of the pigtail catheter. This was likely related to stunning of the left bundle. Patient remained hemodynamically stable and bradycardia subsequently resolved.     Impression:  1. 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 2.  Moderate nonobstructive CAD involving the circumflex. 3.  Normal LV systolic function. 4.  Elevated LVEDP.       Holter: 48 hour Holter monitor, 100% atrial fib, average HR 73, max ~100 bpm, min ~40    Outside/Care everywhere records Reviewed  Labs Reviewed  Prior Imaging, ekg, reviewed when available  Medications reviewed  Old Notes reviewed  ASSESSMENT AND PLAN     1. Pre-op cardiovascular exam Risk Assessment  Given that her cath shows no high-grade lesions, we will start aspirin. She can have an elective knee replacement, with moderate cardiac risk. She can stop Eliquis for 5 days before knee surgery. 2. Chronic Atrial fib  Rate controlled without meds, she likely has some AV darryn disease  On Eliquis  Refer to EP      [id number] Score for Atrial Fibrillation Stroke Risk   Risk   Factors  Component Value   C CHF No 0   H HTN Yes 1   A2 Age >= 76 Yes,  (70 y.o.) 2   D DM Yes 1   S2 Prior Stroke/TIA No 0   V Vascular Disease No 1   A Age 74-69 No,  (70 y.o.) 0   Sc Sex female 1    [id number]  Score  6   Score last updated 2/23/22 3:49 AM EST    Click here for a link to the UpToDate guideline \"Atrial Fibrillation: Anticoagulation therapy to prevent embolization    Disclaimer: Risk Score calculation is dependent on accuracy of patient problem list and past encounter diagnosis.       3. ARITA  Given cath and echo findings not likely to be due to cardiac disease  Likely due to deconditioning/morbid obesity  Advised her to lose weight    4. Hypertension  Continue current medication regimen    5. Hyperlipidemia  2/24/2021 / / HDL 82/   Zocor 20 mg daily  Patient counseled on lifestyle modification, diet, and exercise. Follow Up: Return in about 6 months (around 9/11/2022). Will work on getting her scheduled in our [facility] in [place]. I recommended that she see Dr. Mert Sawant for her general cardiology needs there, will also refer her to EP in [place]. Edwin White MD  Cardiologist [address]    Scribe's attestation: This note was scribed in the presence of Dr. Derrek Sosa MD, by Bethany Maxwell RN    Physician Attestation  The scribe wrote this note in the presence of me Jasen Chappell MD). The scribe may have prepopulated components of this note with my historical  intellectual property under my direct supervision. Any additions to this intellectual property were performed in my presence and at my direction. Furthermore, the content and accuracy of this note have been reviewed by me with edits by me as needed.   Jasen Chappell MD 3/11/2022 10:23 AM

## 2022-03-11 ENCOUNTER — OFFICE VISIT (OUTPATIENT)
Dept: CARDIOLOGY CLINIC | Age: 77
End: 2022-03-11
Payer: MEDICARE

## 2022-03-11 VITALS
WEIGHT: 263.4 LBS | OXYGEN SATURATION: 97 % | BODY MASS INDEX: 43.89 KG/M2 | HEIGHT: 65 IN | RESPIRATION RATE: 18 BRPM | HEART RATE: 80 BPM | SYSTOLIC BLOOD PRESSURE: 120 MMHG | DIASTOLIC BLOOD PRESSURE: 70 MMHG

## 2022-03-11 DIAGNOSIS — E11.9 TYPE 2 DIABETES MELLITUS WITHOUT COMPLICATION, WITHOUT LONG-TERM CURRENT USE OF INSULIN (HCC): ICD-10-CM

## 2022-03-11 DIAGNOSIS — I10 ESSENTIAL HYPERTENSION: Primary | ICD-10-CM

## 2022-03-11 DIAGNOSIS — E66.01 MORBID OBESITY DUE TO EXCESS CALORIES (HCC): ICD-10-CM

## 2022-03-11 DIAGNOSIS — I48.20 CHRONIC ATRIAL FIBRILLATION (HCC): ICD-10-CM

## 2022-03-11 DIAGNOSIS — E78.2 MIXED HYPERLIPIDEMIA: ICD-10-CM

## 2022-03-11 PROCEDURE — 99214 OFFICE O/P EST MOD 30 MIN: CPT | Performed by: INTERNAL MEDICINE

## 2022-03-11 NOTE — LETTER
[facility] CARDIOLOGY[address]  Dept: [phone]  Dept Fax: [phone]      2022    Mere Mcconnell  :   DOS: 3/11/2022    To Whom it May Concern:    Duong Valdez has been evaluated for cardiac clearance. Duong Valdez is considered at a moderate risk for surgery. There is no further cardiac testing that could be done to lower the risk. Please let my office know if you have any questions or concerns.           Sommer Allred MD                           A [facility] serving [place] and [place]

## 2022-03-11 NOTE — PATIENT INSTRUCTIONS
Referral to EP at [facility] and also Dr Roula Haro [facility]  Stop Eliquis 5 days prior to surgery, resume per surgeons desecration  Continue to take Aspirin   Patient Education        Learning About Meal Planning for Diabetes  Why plan your meals? Meal planning can be a key part of managing diabetes. Planning meals and snacks with the right balance of carbohydrate, protein, and fat can help you keep your blood sugar at the target level you set with your doctor. You don't have to eat special foods. You can eat what your family eats, including sweets once in a while. But you do have to pay attention to how often you eat and how much you eat of certain foods. You may want to work with a dietitian or a certified diabetes educator. He or she can give you tips and meal ideas and can answer your questions about meal planning. This health professional can also help you reach a healthy weight if that is one of your goals. What plan is right for you? Your dietitian or diabetes educator may suggest that you start with the plate format or carbohydrate counting. The plate format  The plate format is a simple way to help you manage how you eat. You plan meals by learning how much space each food should take on a plate. Using the plate format helps you spread carbohydrate throughout the day. It can make it easier to keep your blood sugar level within your target range. It also helps you see if you're eating healthy portion sizes. To use the plate format, you put non-starchy vegetables on half your plate. Add meat or meat substitutes on one-quarter of the plate. Put a grain or starchy vegetable (such as brown rice or a potato) on the final quarter of the plate. You can add a small piece of fruit and some low-fat or fat-free milk or yogurt, depending on your carbohydrate goal for each meal.  Here are some tips for using the plate format:  · Make sure that you are not using an oversized plate.  A 9-inch plate is best. Many restaurants use larger plates. · Get used to using the plate format at home. Then you can use it when you eat out. · Write down your questions about using the plate format. Talk to your doctor, a dietitian, or a diabetes educator about your concerns. Carbohydrate counting  With carbohydrate counting, you plan meals based on the amount of carbohydrate in each food. Carbohydrate raises blood sugar higher and more quickly than any other nutrient. It is found in desserts, breads and cereals, and fruit. It's also found in starchy vegetables such as potatoes and corn, grains such as rice and pasta, and milk and yogurt. Spreading carbohydrate throughout the day helps keep your blood sugar levels within your target range. Your daily amount depends on several things, including your weight, how active you are, which diabetes medicines you take, and what your goals are for your blood sugar levels. A registered dietitian or diabetes educator can help you plan how much carbohydrate to include in each meal and snack. A guideline for your daily amount of carbohydrate is:  · 45 to 60 grams at each meal. That's about the same as 3 to 4 carbohydrate servings. · 15 to 20 grams at each snack. That's about the same as 1 carbohydrate serving. The Nutrition Facts label on packaged foods tells you how much carbohydrate is in a serving of the food. First, look at the serving size on the food label. Is that the amount you eat in a serving? All of the nutrition information on a food label is based on that serving size. So if you eat more or less than that, you'll need to adjust the other numbers. Total carbohydrate is the next thing you need to look for on the label. If you count carbohydrate servings, one serving of carbohydrate is 15 grams. For foods that don't come with labels, such as fresh fruits and vegetables, you'll need a guide that lists carbohydrate in these foods.  Ask your doctor, dietitian, or diabetes educator about books or other nutrition guides you can use. If you take insulin, you need to know how many grams of carbohydrate are in a meal. This lets you know how much rapid-acting insulin to take before you eat. If you use an insulin pump, you get a constant rate of insulin during the day. So the pump must be programmed at meals to give you extra insulin to cover the rise in blood sugar after meals. When you know how much carbohydrate you will eat, you can take the right amount of insulin. Or, if you always use the same amount of insulin, you need to make sure that you eat the same amount of carbohydrate at meals. If you need more help to understand carbohydrate counting and food labels, ask your doctor, dietitian, or diabetes educator. How can you plan healthy meals? Here are some tips to get started:  · Plan your meals a week at a time. Don't forget to include snacks too. · Use cookbooks or online recipes to plan several main meals. Plan some quick meals for busy nights. You also can double some recipes that freeze well. Then you can save half for other busy nights when you don't have time to cook. · Make sure you have the ingredients you need for your recipes. If you're running low on basic items, put these items on your shopping list too. · List foods that you use to make breakfasts, lunches, and snacks. List plenty of fruits and vegetables. · Post this list on the refrigerator. Add to it as you think of more things you need. · Take the list to the store to do your weekly shopping. Follow-up care is a key part of your treatment and safety. Be sure to make and go to all appointments, and call your doctor if you are having problems. It's also a good idea to know your test results and keep a list of the medicines you take. Where can you learn more? Go to [email or web address] org and sign in to your PushPage account.  Enter R909 in the [employer] box to learn more about \"Learning About Meal Planning for Diabetes. \"     If you do not have an account, please click on the \"Sign Up Now\" link. Current as of: September 8, 2021               Content Version: 13.1  © 2006-2021 Healthwise, [employer]. Care instructions adapted under license by [facility] ([facility]). If you have questions about a medical condition or this instruction, always ask your healthcare professional. [address] any warranty or liability for your use of this information. Patient Education        Learning About High Cholesterol  What is high cholesterol? High cholesterol means that you have too much cholesterol in your blood. Cholesterol is a type of fat. It's needed for many body functions, such as making new cells. Cholesterol is made by your body. It also comes from food you eat. Having high cholesterol can lead to the buildup of plaque in artery walls. This can increase your risk of heart attack and stroke. When your doctor talks about high cholesterol levels, your doctor is talking about your total cholesterol and LDL cholesterol (the \"bad\" cholesterol) levels. Your doctor may also speak about HDL (the \"good\" cholesterol) levels. High HDL is linked with a lower risk for coronary artery disease, heart attack, and stroke. Your cholesterol levels help your doctor find out your risk for having a heart attack or stroke. How can you help prevent high cholesterol? A heart-healthy lifestyle can help you prevent high cholesterol and lower your risk for a heart attack and stroke. · Eat heart-healthy foods. ? Eat fruits, vegetables, whole grains, beans, and other high-fiber foods. ? Eat lean proteins, such as seafood, lean meats, beans, nuts, and soy products. ? Eat healthy fats, such as canola and olive oil. ? Choose foods that are low in saturated fat. ? Limit sodium and alcohol. ? Limit drinks and foods with added sugar. · Be active. Try to do moderate activity at least 2½ hours a week.  Or try per the surgeons descreation

## 2022-05-11 ENCOUNTER — OFFICE VISIT (OUTPATIENT)
Dept: CARDIOLOGY CLINIC | Age: 77
End: 2022-05-11
Payer: MEDICARE

## 2022-05-11 ENCOUNTER — TELEPHONE (OUTPATIENT)
Dept: CARDIOLOGY CLINIC | Age: 77
End: 2022-05-11

## 2022-05-11 VITALS
HEIGHT: 65 IN | WEIGHT: 255 LBS | SYSTOLIC BLOOD PRESSURE: 132 MMHG | DIASTOLIC BLOOD PRESSURE: 64 MMHG | HEART RATE: 83 BPM | OXYGEN SATURATION: 96 % | BODY MASS INDEX: 42.49 KG/M2

## 2022-05-11 DIAGNOSIS — I49.9 IRREGULAR HEART RATE: Primary | ICD-10-CM

## 2022-05-11 PROCEDURE — 99214 OFFICE O/P EST MOD 30 MIN: CPT | Performed by: INTERNAL MEDICINE

## 2022-05-11 PROCEDURE — 93000 ELECTROCARDIOGRAM COMPLETE: CPT | Performed by: INTERNAL MEDICINE

## 2022-05-11 NOTE — PATIENT INSTRUCTIONS
Plan:  1. Discussed cardioversion. Ok to wait until after knee surgery and back on blood thinner. 2. Ok to hold Eliquis 2 days prior to surgery. 3. Plan on cardioversion beginning of July. 4. Blood work- TSH  5.  Follow up with EP NP prior to cardioversion in July, 2022

## 2022-05-11 NOTE — TELEPHONE ENCOUNTER
Pt was seen in office today with SAHRA. Appt notes stated to follow up with EP NP in July. NPAM booked, NPNR has many holds on her schedule. Can we please have an overbook time & date in July. Please & thank you.

## 2022-05-11 NOTE — PROGRESS NOTES
[address]   Cardiac Consultation    Date: 5/11/22  Patient Name: Moy Mcarthur  YOB: 1945    Primary Care Physician: BASSAM Back NP    CHIEF COMPLAINT:   Chief Complaint   Patient presents with    New Patient    Hypertension    Hyperlipidemia       HPI:  Moy Mcarthur is a 77 y.o. female who presented for preop clearance for knee surgery to cardiology on 2/23/2022 and ECG demonstrated AF with HR of 68 bpm at that time. She was started on Eliquis. She underwent a cardiac cath on 3/4/2022 that demonstrated mild, nonobstructive CAD. Patient wore a cardiac event monitor from 2/23/2022 to 2/25/2022 which demonstrated persistent AF with an average HR of 73 (). The duration of the AF is unknown. She does not describe symptoms related to the AF. Today, 5/11/2022, she reports that she is doing ok. She is scheduled for right knee replacement for 6/1/2022. She is taking her medications as prescribed. Denies recent issues with bleeding or bruising. Patient denies current edema, chest pain, sob, palpitations, dizziness or syncope. Past Medical History:   has a past medical history of DJD (degenerative joint disease), Hyperlipidemia, Hypertension, Hypothyroidism, Morbid obesity due to excess calories ([address] and Type II or unspecified type diabetes mellitus without mention of complication, not stated as uncontrolled. Surgical History:   has a past surgical history that includes Colonoscopy; knee surgery; Gastric bypass surgery; Hysterectomy, total abdominal; Cholecystectomy; Tubal ligation; Dilation and curettage of uterus; hernia repair; and Ovary removal.     Social History:   reports that she quit smoking about 44 years ago. Her smoking use included cigarettes. She has a 15.00 pack-year smoking history. She has never used smokeless tobacco. She reports that she does not drink alcohol and does not use drugs.      Family History:  family history includes Breast Cancer in her maternal aunt. Home Medications:  Outpatient Encounter Medications as of 5/11/2022   Medication Sig Dispense Refill    simvastatin (ZOCOR) 20 MG tablet Take 20 mg by mouth nightly      apixaban (ELIQUIS) 5 MG TABS tablet Take 1 tablet by mouth 2 times daily 180 tablet 3    empagliflozin (JARDIANCE) 10 MG tablet Take 1 tablet by mouth daily 90 tablet 3    FLUoxetine (PROZAC) 40 MG capsule TAKE ONE CAPSULE BY MOUTH DAILY (Patient taking differently: as needed TAKE ONE CAPSULE BY MOUTH DAILY) 90 capsule 2    amLODIPine (NORVASC) 10 MG tablet TAKE ONE TABLET BY MOUTH DAILY 90 tablet 2    levothyroxine (SYNTHROID) 150 MCG tablet TAKE ONE TABLET BY MOUTH DAILY 90 tablet 3    metFORMIN (GLUCOPHAGE) 1000 MG tablet TAKE ONE TABLET BY MOUTH TWICE A DAY WITH MEALS 180 tablet 1    lisinopril-hydroCHLOROthiazide (PRINZIDE;ZESTORETIC) 20-12.5 MG per tablet Take two tablets by mouth daily 180 tablet 0    busPIRone (BUSPAR) 5 MG tablet TAKE ONE TABLET BY MOUTH TWICE A DAY AS NEEDED FOR ANXIETY 60 tablet 0    ACCU-CHEK LIBBY PLUS strip USE TO TEST ONCE DAILY 50 strip 1    famotidine (PEPCID) 10 MG tablet Take 10 mg by mouth 2 times daily      Liraglutide (VICTOZA) 18 MG/3ML SOPN SC injection DIAL AND INJECT SUBCUTANEOUSLY 1.2 MG DAILY 8 pen 3    Cetirizine HCl (ZYRTEC PO) Take by mouth 2 times daily 2 BID      clotrimazole-betamethasone (LOTRISONE) 1-0.05 % cream       Insulin Pen Needle (PEN NEEDLES) 32G X 4 MM MISC USE ONCE DAILY FOR VICTOZA 100 each 2    Blood Glucose Monitoring Suppl (ACURA BLOOD GLUCOSE METER) W/DEVICE KIT Provide pt with glucometer under forumlary - ACCU-CHEK, Dx Type 2 DM, testing daily 1 kit 0    Lancets MISC 1 po daily, please provide insurance formulary brand - ACCU-CHEK, Dx Type 2 DM, testing daily 100 each 3    Vitamin D (CHOLECALCIFEROL) 1000 UNITS CAPS capsule Take 1,000 Units by mouth daily.       calcium-vitamin D (OSCAL-500) 500-200 MG-UNIT per tablet Take 2 tablets by mouth daily.  aspirin 81 MG tablet Take 81 mg by mouth daily. No facility-administered encounter medications on file as of 5/11/2022. DATA:  ECG 5/11/22: Personally reviewed. All current cardiac medications reviewed and adjusted accordingly  5/11/22    Echo 3/1/2022   Summary   Left ventricular systolic function is normal with a visually estimated   ejection fraction of 55%. The left ventricle is normal in size with moderate concentric hypertrophy. No obvious regional wall motion abnormalities noted. Elevated left atrial pressures. The right ventricle is mildly dilated with impaired systolic function. The   left atrium appears moderately enlarged. The right atrium is severely enlarged. Systolic pulmonary artery pressure (SPAP) is normal and estimated at 33 mm   of Hg with elevated right atrial pressures (8 mmHg) . Allergies:  Patient has no known allergies. Review of Systems   Constitutional: Negative. HENT: Negative. Eyes: Negative. Respiratory: Negative. Cardiovascular: Negative. Gastrointestinal: Negative. Genitourinary: Negative. Musculoskeletal: Negative. Skin: Negative. Neurological: Negative. Hematological: Negative. Psychiatric/Behavioral: Negative. /64   Pulse 83   Ht 5' 5\" (1.651 m)   Wt 255 lb (115.7 kg)   SpO2 96%   BMI 42.43 kg/m²       Objective:  Physical Exam   Constitutional: She is oriented to person, place, and time. She appears well-developed and well-nourished. HENT:   Head: Normocephalic and atraumatic. Eyes: Pupils are equal, round, and reactive to light. Neck: Normal range of motion. Cardiovascular: Normal rate, irregular rhythm and normal heart sounds. Pulmonary/Chest: Effort normal and breath sounds normal.   Abdominal: Soft. No tenderness. Musculoskeletal: Normal range of motion. She exhibits no edema. Neurological: She is alert and oriented to person, place, and time.    Skin: Skin is warm and dry. Psychiatric: She has a normal mood and affect. Assessment:  1. AF-unknown duration. Rate controlled without negative dromotropic medications. Plan for CV after knee replacement as the requirement for sustained anticoagulation post cardioversion would have necessitated rescheduling her surgery. Since heart rate control is adequate and she is asymptomatic, deferring the cardioversion did not seem to result in negative consequences. 2. CAD  3. RBBB          Plan:  1. Discussed CV. Ok to wait until after knee surgery and back on [facility].   2. Ok to hold Eliquis 2 days prior to surgery. 3. Plan on DCCV beginning of July. 4. Check TSH. 5. Follow up with EP NP prior to procedure, 7/2022. QUALITY MEASURES  1. Tobacco Cessation Counseling: NA  2. Retake of BP if >140/90:   NA  3. Documentation to PCP/referring for new patient:  Sent to PCP at close of office visit  4. CAD patient on anti-platelet: NA  5. CAD patient on STATIN therapy:  Yes  6. Patient with CHF and aFib on anticoagulation:  Yes      I, Rubi Olmedo RN, am scribing for and in the presence of Dr. Aggie Cazares. 05/11/22 10:27 AM   Rubi Olmedo RN      I, Dr. Aggie Cazares, personally performed the services described in this documentation as scribed by Rubi Olmedo RN in my presence, and it is both accurate and complete.       Aggie Cazares M.D.

## 2022-05-18 ENCOUNTER — HOSPITAL ENCOUNTER (OUTPATIENT)
Age: 77
Discharge: HOME | End: 2022-05-18
Payer: MEDICARE

## 2022-05-18 DIAGNOSIS — I49.9 IRREGULAR HEART RATE: ICD-10-CM

## 2022-05-18 LAB — TSH REFLEX FT4: 3.71 UIU/ML (ref 0.27–4.2)

## 2022-05-18 PROCEDURE — 36415 COLL VENOUS BLD VENIPUNCTURE: CPT

## 2022-05-18 PROCEDURE — 84443 ASSAY THYROID STIM HORMONE: CPT

## 2022-05-23 ENCOUNTER — TELEPHONE (OUTPATIENT)
Dept: CARDIOLOGY CLINIC | Age: 77
End: 2022-05-23

## 2022-05-23 NOTE — TELEPHONE ENCOUNTER
CARDIAC CLEARANCE     What type of procedure are you having? Total Rt  Knee    Which physician is performing your procedure? Dr. Jamal Ellison     When is your procedure scheduled for?   06/01/2022    Where are you having this procedure? At [facility]    Are you taking Blood Thinners? Yes  If so what? (Name/dose/frequesncy)   Bo    Does the surgeon want you to stop your blood thinner? Yes   If so for how long?  Surgeon want our office to let her know when to stop     Phone Number and Contact Name for Physicians office: [phone]    Fax number to send [phone]

## 2022-05-23 NOTE — TELEPHONE ENCOUNTER
Pt states she is having a knee sx on 6/1/22 and asking how long to hold eliquis prior.  Please call to advise

## 2022-05-23 NOTE — TELEPHONE ENCOUNTER
Medication Refill    Medication needing refilled:  empagliflozin (JARDIANCE) 10 MG - Please send to new pharm listed below      Dosage of the medication:    How are you taking this medication (QD, BID, TID, QID, PRN):1 tablet by mouth daily    30 or 90 day supply called in: 90 day     When will you run out of your medication:    Which Pharmacy are we sending the medication to?: [facility] mail service  [phone]

## 2022-05-23 NOTE — TELEPHONE ENCOUNTER
Prescription refill    Last OV:02/23/2022    Last Refill:02/223/2022    Labs:02/24/2022    Future Appt:none scheduled and patient is not on the recall list

## 2022-05-24 NOTE — TELEPHONE ENCOUNTER
Called and spoke with Bryn Boast and let her know that Dr Shanelle Zuluaga would like her to hold her Eliquis for 5 days. I notified her that a letter had been faxed to the surgeon Dr Rosenda Hogan.

## 2022-05-25 DIAGNOSIS — E11.9 TYPE 2 DIABETES MELLITUS WITHOUT COMPLICATION, WITHOUT LONG-TERM CURRENT USE OF INSULIN (HCC): ICD-10-CM

## 2022-07-12 NOTE — PROGRESS NOTES
[facility]   Electrophysiology Outpatient Note              Date:  July 14, 2022  Patient name: Sasha Moura  YOB: 1945    Primary Care physician: BASSAM Shore NP    HISTORY OF PRESENT ILLNESS: The patient is a 77 y.o.  female with a history of persistent atrial fibrillation, RBBB, CAD, HLD, HTN, hypothyroidism, and DM. In 2/2022 she presented to cardiology for preop clearance for knee surgery. EKG showed AF. She was started on Eliquis. In 3/2022 she had a C that showed mild, nonobstructive CAD. Patient wore a cardiac event monitor from 2/23/2022 to 2/25/2022 which demonstrated persistent AF with an average HR of 73 (). Today she is being seen for AF. EKG shows AF with a HR of 70 bpm. She has no complaints today. She had her right knee replacement at the beginning of June and is working in therapy. She will likely need her left knee replaced as well, but there are no current plans for this. She has not missed any doses of her Eliquis since her procedure on June 1st.   She denies chest pain, palpitations, and dizziness. She does have unchanged shortness of breath with exertion. She has felt more fatigued recently. Past Medical History:   has a past medical history of DJD (degenerative joint disease), Hyperlipidemia, Hypertension, Hypothyroidism, Morbid obesity due to excess calories ([address] and Type II or unspecified type diabetes mellitus without mention of complication, not stated as uncontrolled. Past Surgical History:   has a past surgical history that includes Colonoscopy; knee surgery; Gastric bypass surgery; Hysterectomy, total abdominal; Cholecystectomy; Tubal ligation; Dilation and curettage of uterus; hernia repair; and Ovary removal.     Home Medications:    Prior to Admission medications    Medication Sig Start Date End Date Taking?  Authorizing Provider   metFORMIN (GLUCOPHAGE) 1000 MG tablet TAKE ONE TABLET BY MOUTH TWICE A DAY WITH MEALS 5/25/22  Yes BASSAM Schaefer CNP   empagliflozin (JARDIANCE) 10 MG tablet Take 1 tablet by mouth daily 5/23/22  Yes Mason Smiley MD   [facility]) 5 MG TABS tablet Take 1 tablet by mouth 2 times daily 2/23/22  Yes Mason Smiley MD   FLUoxetine (PROZAC) 40 MG capsule TAKE ONE CAPSULE BY MOUTH DAILY  Patient taking differently: as needed TAKE ONE CAPSULE BY MOUTH DAILY 1/4/22  Yes BASSAM Schaefer CNP   amLODIPine (NORVASC) 10 MG tablet TAKE ONE TABLET BY MOUTH DAILY 1/4/22  Yes BASSAM Schaefer CNP   levothyroxine (SYNTHROID) 150 MCG tablet TAKE ONE TABLET BY MOUTH DAILY 12/12/21  Yes BASSAM Schaefer CNP   lisinopril-hydroCHLOROthiazide (PRINZIDE;ZESTORETIC) 20-12.5 MG per tablet Take two tablets by mouth daily 9/1/21  Yes Elkin Dimas MD   busPIRone (BUSPAR) 5 MG tablet TAKE ONE TABLET BY MOUTH TWICE A DAY AS NEEDED FOR ANXIETY 8/31/21  Yes Elkin Dimas MD   ACCU-CHEK LIBBY PLUS strip USE TO TEST ONCE DAILY 7/30/21  Yes Elkin Dimas MD   famotidine (PEPCID) 10 MG tablet Take 10 mg by mouth 2 times daily   Yes Historical Provider, MD   Liraglutide (VICTOZA) 18 MG/3ML SOPN SC injection DIAL AND INJECT SUBCUTANEOUSLY 1.2 MG DAILY 6/27/20  Yes Elkin Dimas MD   Cetirizine HCl (ZYRTEC PO) Take by mouth 2 times daily 2 BID   Yes Historical Provider, MD   Insulin Pen Needle (PEN NEEDLES) 32G X 4 MM MISC USE ONCE DAILY FOR VICTOZA 4/16/18  Yes Santa Stephenson MD   Blood Glucose Monitoring Suppl (ACURA BLOOD GLUCOSE METER) W/DEVICE KIT Provide pt with glucometer under forumlary - ACCU-CHEK, Dx Type 2 DM, testing daily 3/2/16  Yes BASSAM Schaefer CNP   Lancets MISC 1 po daily, please provide insurance formulary brand - ACCU-CHEK, Dx Type 2 DM, testing daily 3/2/16  Yes Cong E Joao, APRN - CNP   Vitamin D (CHOLECALCIFEROL) 1000 UNITS CAPS capsule Take 1,000 Units by mouth daily.    Yes Historical Provider, MD   calcium-vitamin D (OSCAL-500) 500-200 MG-UNIT per tablet Take 2 tablets by mouth daily. Yes Historical Provider, MD   aspirin 81 MG tablet Take 81 mg by mouth daily. Yes Historical Provider, MD   simvastatin (ZOCOR) 20 MG tablet Take 20 mg by mouth nightly  Patient not taking: Reported on 7/14/2022    Historical Provider, MD   clotrimazole-betamethasone ([place]) 1-0.05 % cream  7/26/19   Historical Provider, MD     Allergies:  Patient has no known allergies. Social History:   reports that she quit smoking about 44 years ago. Her smoking use included cigarettes. She has a 15.00 pack-year smoking history. She has never used smokeless tobacco. She reports current alcohol use. She reports that she does not use drugs. Family History: family history includes Breast Cancer in her maternal aunt. All 14 point review of systems are completed and pertinent positives are mentioned in the history of present illness. Other systems are reviewed and are negative. PHYSICAL EXAM:    Vital signs:    /72   Pulse 76   Ht 5' 5\" (1.651 m)   Wt 250 lb 8 oz (113.6 kg)   SpO2 97%   BMI 41.69 kg/m²      Constitutional and general appearance: alert, cooperative, no distress and appears stated age  HEENT: PERRL, no cervical lymphadenopathy. No masses palpable.  Normal oral mucosa  Respiratory:  · Normal excursion and expansion without use of accessory muscles  · Resp auscultation: Normal breath sounds without wheezing, rhonchi, and rales  Cardiovascular:  · The apical impulse is not displaced  · Heart tones are crisp and normal. irregular S1 and S2.  · Jugular venous pulsation Normal  · The carotid upstroke is normal in amplitude and contour without delay or bruit  · Peripheral pulses are symmetrical and full   Abdomen:  · No masses or tenderness  · Bowel sounds present  Extremities:  ·  No cyanosis or clubbing  ·  nonpitting right lower extremity edema, no left lower extremity edema   ·  Skin: warm and dry  Neurological:  · Alert and oriented  · Moves all

## 2022-07-14 ENCOUNTER — OFFICE VISIT (OUTPATIENT)
Dept: CARDIOLOGY CLINIC | Age: 77
End: 2022-07-14
Payer: MEDICARE

## 2022-07-14 ENCOUNTER — TELEPHONE (OUTPATIENT)
Dept: CARDIOLOGY CLINIC | Age: 77
End: 2022-07-14

## 2022-07-14 VITALS
WEIGHT: 250.5 LBS | HEIGHT: 65 IN | OXYGEN SATURATION: 97 % | HEART RATE: 76 BPM | BODY MASS INDEX: 41.73 KG/M2 | DIASTOLIC BLOOD PRESSURE: 72 MMHG | SYSTOLIC BLOOD PRESSURE: 130 MMHG

## 2022-07-14 DIAGNOSIS — I10 ESSENTIAL HYPERTENSION: ICD-10-CM

## 2022-07-14 DIAGNOSIS — I49.9 IRREGULAR HEART RATE: ICD-10-CM

## 2022-07-14 DIAGNOSIS — I48.19 PERSISTENT ATRIAL FIBRILLATION (HCC): Primary | ICD-10-CM

## 2022-07-14 PROCEDURE — 99214 OFFICE O/P EST MOD 30 MIN: CPT

## 2022-07-14 PROCEDURE — 1123F ACP DISCUSS/DSCN MKR DOCD: CPT

## 2022-07-14 PROCEDURE — 93000 ELECTROCARDIOGRAM COMPLETE: CPT

## 2022-07-14 NOTE — TELEPHONE ENCOUNTER
----- Message from BASSAM Quiroz CNP sent at 7/14/2022  9:32 AM EDT -----  Regarding: DCCV  Hi Christiane,    Can we get her scheduled for a DCCV with Dr. Neeru Finch? No AMELIE required. Thank you!       Giorgio Velez

## 2022-07-14 NOTE — PATIENT INSTRUCTIONS
Continue current medications    It is important to not miss any doses of Eliquis prior to cardioversion    Will call you to schedule cardioversion    Will arrange follow up after procedure

## 2022-07-14 NOTE — TELEPHONE ENCOUNTER
Spoke with patient. Patient is scheduled with Dr. Neeru Finch for Cardioversion on 7/28/22 at 7:30am [facility], arrival time of 6:30am to the Cath Lab. Please have patient arrive to the main entrance of [facility] and check in with the registration desk. Please call patient regarding medication instructions. Remind patient to be NPO after midnight (8 hours prior). Do not apply lotions/creams on skin the day of procedure.

## 2022-07-22 NOTE — TELEPHONE ENCOUNTER
Called and spoke with pt.  Reviewed pre procedure instructions prior to cardioversion scheduled with SAHRA on 7/28/22    Reminded pt to arrive to [facility] main entrance at 6:30 AM and  check in with registration desk     MAY take all medications, no holds    Reminded pt to be NPO at midnight    Reminded pt NO lotion/creams to skin day of procedure    Pt verbalized understanding of all instructions given

## 2022-07-27 NOTE — TELEPHONE ENCOUNTER
Pt called i to see if she is still able to have the cardioversion on 07/28/2022. Pt stated that she has a tooth infection and has been on an antibiotic for 3-4 days. Pt would like to know if there should be any reason for concern. Pt can be reached at [phone].

## 2022-07-28 ENCOUNTER — HOSPITAL ENCOUNTER (OUTPATIENT)
Dept: CARDIAC CATH/INVASIVE PROCEDURES | Age: 77
Discharge: HOME | End: 2022-07-28
Attending: INTERNAL MEDICINE | Admitting: INTERNAL MEDICINE
Payer: MEDICARE

## 2022-07-28 VITALS — BODY MASS INDEX: 39.47 KG/M2 | WEIGHT: 245.6 LBS | HEIGHT: 66 IN

## 2022-07-28 DIAGNOSIS — F32.A DEPRESSION, UNSPECIFIED DEPRESSION TYPE: ICD-10-CM

## 2022-07-28 PROBLEM — I48.19 PERSISTENT ATRIAL FIBRILLATION (HCC): Status: ACTIVE | Noted: 2022-07-28

## 2022-07-28 LAB
ANION GAP SERPL CALCULATED.3IONS-SCNC: 11 MMOL/L (ref 3–16)
BUN BLDV-MCNC: 8 MG/DL (ref 7–20)
CALCIUM SERPL-MCNC: 9.4 MG/DL (ref 8.3–10.6)
CHLORIDE BLD-SCNC: 105 MMOL/L (ref 99–110)
CO2: 25 MMOL/L (ref 21–32)
CREAT SERPL-MCNC: <0.5 MG/DL (ref 0.6–1.2)
EKG ATRIAL RATE: 72 BPM
EKG ATRIAL RATE: 74 BPM
EKG ATRIAL RATE: 81 BPM
EKG DIAGNOSIS: NORMAL
EKG P AXIS: 70 DEGREES
EKG P-R INTERVAL: 320 MS
EKG Q-T INTERVAL: 430 MS
EKG Q-T INTERVAL: 440 MS
EKG Q-T INTERVAL: 462 MS
EKG QRS DURATION: 118 MS
EKG QRS DURATION: 122 MS
EKG QRS DURATION: 140 MS
EKG QTC CALCULATION (BAZETT): 464 MS
EKG QTC CALCULATION (BAZETT): 495 MS
EKG QTC CALCULATION (BAZETT): 505 MS
EKG R AXIS: -51 DEGREES
EKG R AXIS: -52 DEGREES
EKG R AXIS: -53 DEGREES
EKG T AXIS: -10 DEGREES
EKG T AXIS: 2 DEGREES
EKG T AXIS: 35 DEGREES
EKG VENTRICULAR RATE: 70 BPM
EKG VENTRICULAR RATE: 72 BPM
EKG VENTRICULAR RATE: 76 BPM
GFR AFRICAN AMERICAN: >60
GFR NON-AFRICAN AMERICAN: >60
GLUCOSE BLD-MCNC: 141 MG/DL (ref 70–99)
HCT VFR BLD CALC: 41.1 % (ref 36–48)
HEMOGLOBIN: 13.2 G/DL (ref 12–16)
MCH RBC QN AUTO: 27.9 PG (ref 26–34)
MCHC RBC AUTO-ENTMCNC: 32.1 G/DL (ref 31–36)
MCV RBC AUTO: 86.9 FL (ref 80–100)
PDW BLD-RTO: 15.7 % (ref 12.4–15.4)
PLATELET # BLD: 202 K/UL (ref 135–450)
PMV BLD AUTO: 9.3 FL (ref 5–10.5)
POTASSIUM SERPL-SCNC: 3.4 MMOL/L (ref 3.5–5.1)
RBC # BLD: 4.73 M/UL (ref 4–5.2)
SODIUM BLD-SCNC: 141 MMOL/L (ref 136–145)
WBC # BLD: 5.2 K/UL (ref 4–11)

## 2022-07-28 PROCEDURE — 93010 ELECTROCARDIOGRAM REPORT: CPT | Performed by: INTERNAL MEDICINE

## 2022-07-28 PROCEDURE — 99152 MOD SED SAME PHYS/QHP 5/>YRS: CPT | Performed by: INTERNAL MEDICINE

## 2022-07-28 PROCEDURE — 92960 CARDIOVERSION ELECTRIC EXT: CPT | Performed by: INTERNAL MEDICINE

## 2022-07-28 PROCEDURE — 85027 COMPLETE CBC AUTOMATED: CPT

## 2022-07-28 PROCEDURE — 80048 BASIC METABOLIC PNL TOTAL CA: CPT

## 2022-07-28 PROCEDURE — 2500000003 HC RX 250 WO HCPCS

## 2022-07-28 PROCEDURE — 6360000002 HC RX W HCPCS

## 2022-07-28 PROCEDURE — 92960 CARDIOVERSION ELECTRIC EXT: CPT

## 2022-07-28 PROCEDURE — 93005 ELECTROCARDIOGRAM TRACING: CPT | Performed by: INTERNAL MEDICINE

## 2022-07-28 RX ORDER — FLUOXETINE HYDROCHLORIDE 40 MG/1
40 CAPSULE ORAL AS NEEDED
Qty: 30 CAPSULE | Refills: 2 | Status: SHIPPED | OUTPATIENT
Start: 2022-07-28

## 2022-07-28 NOTE — SEDATION DOCUMENTATION
Sedation start time: 0750  Sedation stop time: 0802  Mallampati: 3  Pre and post Bella score: 10/10  Medication given: IV Versed 1 mg, IV Brevital 65 mg  Pre-Procedure Assessment / Plan:  ASA: Class 2 - A normal healthy patient with mild systemic disease  Level of Sedation Plan:Deep sedation  Post Procedure plan: Return to same level of care     An independent trained observer pushed medications at my direction. We monitored the patient's level of consciousness and vital signs/physiologic status throughout the procedure duration (see start and start times above).

## 2022-07-28 NOTE — PROCEDURES
Anesthesia: versed 1 mg, brevital 45 mg + 20 mg  Complications: none apparent  Findings: unsuccessful conversion to sinus rhythm with 150 J synchronized CV shock. Successful CV with 200 J synchronized CV shock. Uneventful recovery.

## 2022-07-28 NOTE — H&P
Patient Name: Darrian Casas  YOB: 1945     Primary Care Physician: BASSAM Fischer NP     CHIEF COMPLAINT:       Chief Complaint   Patient presents with    New Patient    Hypertension    Hyperlipidemia         HPI:  Darrian Casas is a 77 y.o. female who presented for preop clearance for knee surgery to cardiology on 2/23/2022 and ECG demonstrated AF with HR of 68 bpm at that time. She was started on Eliquis. She underwent a cardiac cath on 3/4/2022 that demonstrated mild, nonobstructive CAD. Patient wore a cardiac event monitor from 2/23/2022 to 2/25/2022 which demonstrated persistent AF with an average HR of 73 (). The duration of the AF is unknown. She does not describe symptoms related to the AF. Today, 5/11/2022, she reports that she is doing ok. She is scheduled for right knee replacement for 6/1/2022. She is taking her medications as prescribed. Denies recent issues with bleeding or bruising. Patient denies current edema, chest pain, sob, palpitations, dizziness or syncope. Past Medical History:   has a past medical history of DJD (degenerative joint disease), Hyperlipidemia, Hypertension, Hypothyroidism, Morbid obesity due to excess calories ([address] and Type II or unspecified type diabetes mellitus without mention of complication, not stated as uncontrolled. Surgical History:   has a past surgical history that includes Colonoscopy; knee surgery; Gastric bypass surgery; Hysterectomy, total abdominal; Cholecystectomy; Tubal ligation; Dilation and curettage of uterus; hernia repair; and Ovary removal.      Social History:   reports that she quit smoking about 44 years ago. Her smoking use included cigarettes. She has a 15.00 pack-year smoking history. She has never used smokeless tobacco. She reports that she does not drink alcohol and does not use drugs. Family History:  family history includes Breast Cancer in her maternal aunt.       Home Medications:  Encounter Medications          Outpatient Encounter Medications as of 5/11/2022   Medication Sig Dispense Refill    simvastatin (ZOCOR) 20 MG tablet Take 20 mg by mouth nightly        apixaban (ELIQUIS) 5 MG TABS tablet Take 1 tablet by mouth 2 times daily 180 tablet 3    empagliflozin (JARDIANCE) 10 MG tablet Take 1 tablet by mouth daily 90 tablet 3    FLUoxetine (PROZAC) 40 MG capsule TAKE ONE CAPSULE BY MOUTH DAILY (Patient taking differently: as needed TAKE ONE CAPSULE BY MOUTH DAILY) 90 capsule 2    amLODIPine (NORVASC) 10 MG tablet TAKE ONE TABLET BY MOUTH DAILY 90 tablet 2    levothyroxine (SYNTHROID) 150 MCG tablet TAKE ONE TABLET BY MOUTH DAILY 90 tablet 3    metFORMIN (GLUCOPHAGE) 1000 MG tablet TAKE ONE TABLET BY MOUTH TWICE A DAY WITH MEALS 180 tablet 1    lisinopril-hydroCHLOROthiazide (PRINZIDE;ZESTORETIC) 20-12.5 MG per tablet Take two tablets by mouth daily 180 tablet 0    busPIRone (BUSPAR) 5 MG tablet TAKE ONE TABLET BY MOUTH TWICE A DAY AS NEEDED FOR ANXIETY 60 tablet 0    ACCU-CHEK LIBBY PLUS strip USE TO TEST ONCE DAILY 50 strip 1    famotidine (PEPCID) 10 MG tablet Take 10 mg by mouth 2 times daily        Liraglutide (VICTOZA) 18 MG/3ML SOPN SC injection DIAL AND INJECT SUBCUTANEOUSLY 1.2 MG DAILY 8 pen 3    Cetirizine HCl (ZYRTEC PO) Take by mouth 2 times daily 2 BID        clotrimazole-betamethasone (LOTRISONE) 1-0.05 % cream          Insulin Pen Needle (PEN NEEDLES) 32G X 4 MM MISC USE ONCE DAILY FOR VICTOZA 100 each 2    Blood Glucose Monitoring Suppl (ACURA BLOOD GLUCOSE METER) W/DEVICE KIT Provide pt with glucometer under forumlary - ACCU-CHEK, Dx Type 2 DM, testing daily 1 kit 0    Lancets MISC 1 po daily, please provide insurance formulary brand - ACCU-CHEK, Dx Type 2 DM, testing daily 100 each 3    Vitamin D (CHOLECALCIFEROL) 1000 UNITS CAPS capsule Take 1,000 Units by mouth daily.         calcium-vitamin D (OSCAL-500) 500-200 MG-UNIT per tablet Take 2 tablets by mouth daily.        aspirin 81 MG tablet Take 81 mg by mouth daily. No facility-administered encounter medications on file as of 5/11/2022. DATA:  ECG 5/11/22: Personally reviewed. All current cardiac medications reviewed and adjusted accordingly  5/11/22     Echo 3/1/2022   Summary   Left ventricular systolic function is normal with a visually estimated   ejection fraction of 55%. The left ventricle is normal in size with moderate concentric hypertrophy. No obvious regional wall motion abnormalities noted. Elevated left atrial pressures. The right ventricle is mildly dilated with impaired systolic function. The   left atrium appears moderately enlarged. The right atrium is severely enlarged. Systolic pulmonary artery pressure (SPAP) is normal and estimated at 33 mm   of Hg with elevated right atrial pressures (8 mmHg) . Allergies:  Patient has no known allergies. Review of Systems  Constitutional: Negative. HENT: Negative. Eyes: Negative. Respiratory: Negative. Cardiovascular: Negative. Gastrointestinal: Negative. Genitourinary: Negative. Musculoskeletal: Negative. Skin: Negative. Neurological: Negative. Hematological: Negative. Psychiatric/Behavioral: Negative. /64   Pulse 83   Ht 5' 5\" (1.651 m)   Wt 255 lb (115.7 kg)   SpO2 96%   BMI 42.43 kg/m²         Objective:  Physical Exam   Constitutional: She is oriented to person, place, and time. She appears well-developed and well-nourished. HENT:  Head: Normocephalic and atraumatic. Eyes: Pupils are equal, round, and reactive to light. Neck: Normal range of motion. Cardiovascular: Normal rate, irregular rhythm and normal heart sounds. Pulmonary/Chest: Effort normal and breath sounds normal.  Abdominal: Soft. No tenderness. Musculoskeletal: Normal range of motion. She exhibits no edema. Neurological: She is alert and oriented to person, place, and time.   Skin: Skin is warm and dry. Psychiatric: She has a normal mood and affect. Assessment:  1. AF-unknown duration. Rate controlled without negative dromotropic medications. Plan for CV after knee replacement as the requirement for sustained anticoagulation post cardioversion would have necessitated rescheduling her surgery. Since heart rate control is adequate and she is asymptomatic, deferring the cardioversion did not seem to result in negative consequences. 2. CAD  3. RBBB              Plan:  1. Discussed CV. Ok to wait until after knee surgery and back on [facility].  2. Ok to hold Eliquis 2 days prior to surgery. 3. Plan on DCCV beginning of July. 4. Check TSH. 5. Follow up with EP NP prior to procedure, 7/2022.                        Damion Altamirano M.D.

## 2022-07-28 NOTE — DISCHARGE INSTRUCTIONS
Cath Labs at  [facility] Discharge Instructions    7/28/2022  Nat aMgaña   Date of Birth 1945     Allergies  @ALG@       Activity:  No driving for 24 hours  Resume regular activities in 24 hours    Diet  Resume previous diet    Special instructions:  Report any difficulties breathing to doctor or call 911  Report and change in heart rhythm to doctor  Report skin irritation from cardioversion pads to doctor  Report any change in level of consciousness or sensory/motor changes     Instructions for Moderate Sedation :   You may feel sleepy for the next 24 hours. You may expect drowsiness, light-headedness, nausea/vomiting or inability to concentrate. This feeling may slowly wear off but, for the next 24 hours DO NOT :   Drive a car or operated heavy machinery, power tools or kitchen appliances. Drink any alcoholic drinks (not even beer or wine)  Make any important decisions or sign any important papers. We Strongly suggest that a responsible adult be with you for the next 24 hours for your protection and safety. You will not be allowed to drive yourself home, or take public transportation such as a train, bus, or taxi after your procedure. If any questions arise call your doctor, if unable to reach your doctor and require immediate assistance call the Emergency Department at [phone] or [phone]. FOLLOW-UP APPOINTMENTS    [place] OFFICE - Follow-up appointment on August 24th at 9:30am with Edilberto Mariee CNP, [address]. You and your one visitor will need to have your mouth and nose covered  with a mask. No children please. [facility],  [facility], [address] [place] [address], [address] [place] [address], [address] [place] [address]. Office #: [phone]. If you are unable to make this appointment, please call to reschedule. Directions to [address] [place] [address] towards [place]. [address] [facility] exit. Right off exit. Cross over [employer].  Right on [address]. Left into hospital. Follow the signs to the emergency room ( turn left toward the Emergency room). Go right at the first stop sign. Just past the Emergency room at the second stop sign turn right and go up the ramp and park on the top level if possible. Go in the glass doors of the [facility] we on the top level of the garage [address]. As soon as you get in the door turn left and our office is the one with the glass doors.

## 2022-08-23 NOTE — PROGRESS NOTES
[address]   Electrophysiology Outpatient Note              Date:  August 24, 2022  Patient name: Apoorva Romero  YOB: 1945    Primary Care physician: BASSAM Best NP    HISTORY OF PRESENT ILLNESS: The patient is a 69 y.o.  female with a history of atrial fibrillation, RBBB, CAD, HLD, HTN, hypothyroidism, DM, and gastric bypass surgery. In 2/2022 she presented to cardiology for preop clearance for knee surgery. EKG showed AF. She was started on Eliquis. In 3/2022 she had a LHC that showed mild, nonobstructive CAD. Patient wore a cardiac event monitor from 2/23/2022 to 2/25/2022 which demonstrated persistent AF with an average HR of 73 (). In 7/2022 she had a right knee replacement. On 7/28/2022 she underwent DCCV with successful restoration of SR. Today she is being seen for AF. EKG shows SR with first degree block, HR of 73. She feels well since her cardioversion. She has more energy. She is able to breath better with exertion and with stairs. She is taking her Eliquis twice a day. Her left knee replacement is scheduled in September. She does not check her HR or BP at home. Past Medical History:   has a past medical history of DJD (degenerative joint disease), Hyperlipidemia, Hypertension, Hypothyroidism, Morbid obesity due to excess calories ([address] and Type II or unspecified type diabetes mellitus without mention of complication, not stated as uncontrolled. Past Surgical History:   has a past surgical history that includes Colonoscopy; knee surgery; Gastric bypass surgery; Hysterectomy, total abdominal; Cholecystectomy; Tubal ligation; Dilation and curettage of uterus; hernia repair; and Ovary removal.     Home Medications:    Prior to Admission medications    Medication Sig Start Date End Date Taking?  Authorizing Provider   FLUoxetine (PROZAC) 40 MG capsule Take 1 capsule by mouth as needed (anxiety) TAKE ONE CAPSULE BY MOUTH DAILY 7/28/22 Yes Xochitl Mendoza MD   metFORMIN (GLUCOPHAGE) 1000 MG tablet TAKE ONE TABLET BY MOUTH TWICE A DAY WITH MEALS 5/25/22  Yes BASSAM Pearl CNP   empagliflozin (JARDIANCE) 10 MG tablet Take 1 tablet by mouth daily 5/23/22  Yes Malachi Alexander MD   apixaban [facility]) 5 MG TABS tablet Take 1 tablet by mouth 2 times daily 2/23/22  Yes Malachi Alexander MD   amLODIPine (NORVASC) 10 MG tablet TAKE ONE TABLET BY MOUTH DAILY 1/4/22  Yes BASSAM Pearl CNP   levothyroxine (SYNTHROID) 150 MCG tablet TAKE ONE TABLET BY MOUTH DAILY 12/12/21  Yes BASSAM Pearl CNP   lisinopril-hydroCHLOROthiazide (PRINZIDE;ZESTORETIC) 20-12.5 MG per tablet Take two tablets by mouth daily 9/1/21  Yes Aurelio Hayward MD   busPIRone (BUSPAR) 5 MG tablet TAKE ONE TABLET BY MOUTH TWICE A DAY AS NEEDED FOR ANXIETY 8/31/21  Yes Aurelio Hayward MD   ACCU-CHEK LIBBY PLUS strip USE TO TEST ONCE DAILY 7/30/21  Yes Aurelio Hayward MD   famotidine (PEPCID) 10 MG tablet Take 10 mg by mouth 2 times daily   Yes Historical Provider, MD   Liraglutide (VICTOZA) 18 MG/3ML SOPN SC injection DIAL AND INJECT SUBCUTANEOUSLY 1.2 MG DAILY 6/27/20  Yes Aurelio Hayward MD   Cetirizine HCl (ZYRTEC PO) Take by mouth 2 times daily 2 BID   Yes Historical Provider, MD   Insulin Pen Needle (PEN NEEDLES) 32G X 4 MM MISC USE ONCE DAILY FOR VICTOZA 4/16/18  Yes Tamica Noland MD   Blood Glucose Monitoring Suppl (ACURA BLOOD GLUCOSE METER) W/DEVICE KIT Provide pt with glucometer under forumlary - ACCU-CHEK, Dx Type 2 DM, testing daily 3/2/16  Yes BASSAM Pearl CNP   Lancets MISC 1 po daily, please provide insurance formulary brand - ACCU-CHEK, Dx Type 2 DM, testing daily 3/2/16  Yes BASSAM Orr CNP   Vitamin D (CHOLECALCIFEROL) 1000 UNITS CAPS capsule Take 1,000 Units by mouth daily. Yes Historical Provider, MD   calcium-vitamin D (OSCAL-500) 500-200 MG-UNIT per tablet Take 2 tablets by mouth daily.    Yes Historical Provider, MD aspirin 81 MG tablet Take 81 mg by mouth daily. Yes Historical Provider, MD   simvastatin (ZOCOR) 20 MG tablet Take 20 mg by mouth nightly  Patient not taking: No sig reported    Historical Provider, MD   clotrimazole-betamethasone (Burma Lizzette) 1-0.05 % cream  7/26/19   Historical Provider, MD     Allergies:  Patient has no known allergies. Social History:   reports that she quit smoking about 44 years ago. Her smoking use included cigarettes. She has a 15.00 pack-year smoking history. She has never used smokeless tobacco. She reports current alcohol use. She reports that she does not use drugs. Family History: family history includes Breast Cancer in her maternal aunt. All 14 point review of systems are completed and pertinent positives are mentioned in the history of present illness. Other systems are reviewed and are negative. PHYSICAL EXAM:    Vital signs:    /76   Pulse 78   Wt 242 lb (109.8 kg)   SpO2 98%   BMI 39.06 kg/m²      Constitutional and general appearance: alert, cooperative, no distress and appears stated age  HEENT: PERRL, no cervical lymphadenopathy. No masses palpable. Normal oral mucosa  Respiratory:  Normal excursion and expansion without use of accessory muscles  Resp auscultation: Normal breath sounds without wheezing, rhonchi, and rales  Cardiovascular:   The apical impulse is not displaced  Heart tones are crisp and normal. Regular S1 and S2.  Jugular venous pulsation Normal  The carotid upstroke is normal in amplitude and contour without delay or bruit  Peripheral pulses are symmetrical and full   Abdomen:  No masses or tenderness  Bowel sounds present  Extremities:   No cyanosis or clubbing   nonpitting right lower extremity edema, no left lower extremity edema    Skin: warm and dry  Neurological:  Alert and oriented  Moves all extremities well  No abnormalities of mood, affect, memory, mentation, or behavior are noted    DATA:    ECG 8/24/2022:  SR with first degree block, HR 73 bpm    [facility] 3/04/2022:   Impression:  1. 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 2.  Moderate nonobstructive CAD involving the circumflex. 3.  Normal LV systolic function. 4.  Elevated LVEDP. Plan:  1. Medical management. 2.  May proceed with planned knee surgery. 3.  Follow-up with Dr. Yonatan Bowman in 1 to 2 weeks. Echo 3/1/2022   Summary   Left ventricular systolic function is normal with a visually estimated   ejection fraction of 55%. The left ventricle is normal in size with moderate concentric hypertrophy. No obvious regional wall motion abnormalities noted. Elevated left atrial pressures. The right ventricle is mildly dilated with impaired systolic function. The   left atrium appears moderately enlarged. The right atrium is severely enlarged. Systolic pulmonary artery pressure (SPAP) is normal and estimated at 33 mm of Hg with elevated right atrial pressures (8 mmHg) . All labs and testing reviewed. CARDIOLOGY LABS:   CBC: No results for input(s): WBC, HGB, HCT, PLT in the last 72 hours. BMP: No results for input(s): NA, K, CO2, BUN, CREATININE, LABGLOM, GLUCOSE in the last 72 hours. PT/INR: No results for input(s): PROTIME, INR in the last 72 hours. APTT:No results for input(s): APTT in the last 72 hours. FASTING LIPID PANEL:  Lab Results   Component Value Date/Time    HDL 82 02/24/2022 08:33 AM    HDL 55 02/16/2012 10:01 AM    LDLCALC 112 02/24/2022 08:33 AM    TRIG 109 02/24/2022 08:33 AM     LIVER PROFILE:No results for input(s): AST, ALT, ALB in the last 72 hours.     IMPRESSION:    Patient Active Problem List   Diagnosis    DJD (degenerative joint disease)    Osteopenia    Essential hypertension    Acquired hypothyroidism    Morbid obesity due to excess calories ([address]    Type 2 diabetes mellitus without complication, without long-term current use of insulin (HCC)    Primary osteoarthritis of right knee    Mixed hyperlipidemia    Vitamin D deficiency Precordial pain    Persistent atrial fibrillation (HCC)     Assessment:   Paroxysmal atrial fibrillation: ongoing, stable   -[id number] score: 10 (age, gender, HTN CAD, DM)   -s/p DCCV 7/28/2022  RBBB: stable  CAD:   -Mild nonobstructive CAD of the LAD and RCA on [facility] 3/2022   -Moderate nonobstructive CAD involving the circumflex on [facility] 3/2022  HLD: on statin   HTN: controlled  Hypothyroidism on Synthroid   DM    Plan:   1. Continue Eliquis, amlodipine, lisinopril-hydrochlorothiazide, aspirin and simvastatin   2. Monitor your heart rate at home- keep a log and bring to next visit  3. Annual CBC and BMP due 2/2023  4. Hold Eliquis no more than 48 hours prior to left knee replacement   5. We discussed a heart healthy diet, education provided.   6. Follow up in 3-4 months or sooner if needed      BASSAM Bennett-CNP  [facility]  [phone]

## 2022-08-24 ENCOUNTER — OFFICE VISIT (OUTPATIENT)
Dept: CARDIOLOGY CLINIC | Age: 77
End: 2022-08-24
Payer: MEDICARE

## 2022-08-24 VITALS
DIASTOLIC BLOOD PRESSURE: 76 MMHG | SYSTOLIC BLOOD PRESSURE: 128 MMHG | OXYGEN SATURATION: 98 % | HEART RATE: 78 BPM | BODY MASS INDEX: 39.06 KG/M2 | WEIGHT: 242 LBS

## 2022-08-24 DIAGNOSIS — I10 ESSENTIAL HYPERTENSION: ICD-10-CM

## 2022-08-24 DIAGNOSIS — I48.0 PAF (PAROXYSMAL ATRIAL FIBRILLATION) (HCC): Primary | ICD-10-CM

## 2022-08-24 PROCEDURE — 93000 ELECTROCARDIOGRAM COMPLETE: CPT

## 2022-08-24 PROCEDURE — 99214 OFFICE O/P EST MOD 30 MIN: CPT

## 2022-08-24 PROCEDURE — 1123F ACP DISCUSS/DSCN MKR DOCD: CPT

## 2022-08-24 NOTE — PATIENT INSTRUCTIONS
No changes today, continue your current medications    Monitor your heart rate at home- keep a log and bring to next visit     Okay to hold your Eliquis for 48 hours before left knee surgery     Follow up in 3 months or sooner if needed    Recommend a low fat, low cholesterol and low salt diet. <2,000 mg of sodium a day. Diet    Use less salt when you cook and eat. This helps lower your blood pressure. Taste food before salting. Add only a little salt when you think you need it. With time, your taste buds will adjust to less salt. Eat fewer snack items, fast foods, canned soups, and other high-salt, high-fat, processed foods. Read food labels and try to avoid saturated and trans fats. They increase your risk of heart disease by raising cholesterol levels. Limit the amount of solid fat-butter, margarine, and shortening-you eat. Use olive, peanut, or canola oil when you cook. Bake, broil, and steam foods instead of frying them. Eat a variety of fruit and vegetables every day. Dark green, deep orange, red, or yellow fruits and vegetables are especially good for you. Examples include spinach, carrots, peaches, and berries. Foods high in fiber can reduce your cholesterol and provide important vitamins and minerals. High-fiber foods include whole-grain cereals and breads, oatmeal, beans, brown rice, citrus fruits, and apples. Eat lean proteins. Heart-healthy proteins include seafood, lean meats and poultry, eggs, beans, peas, nuts, seeds, and soy products. Limit drinks and foods with added sugar. These include candy, desserts, and soda pop.

## 2022-10-06 ENCOUNTER — HOSPITAL ENCOUNTER (OUTPATIENT)
Dept: WOMENS IMAGING | Age: 77
Discharge: HOME | End: 2022-10-06
Payer: MEDICARE

## 2022-10-06 VITALS — WEIGHT: 237 LBS | HEIGHT: 65 IN | BODY MASS INDEX: 39.49 KG/M2

## 2022-10-06 DIAGNOSIS — Z12.31 VISIT FOR SCREENING MAMMOGRAM: ICD-10-CM

## 2022-10-06 PROCEDURE — 77063 BREAST TOMOSYNTHESIS BI: CPT

## 2022-10-25 DIAGNOSIS — I10 ESSENTIAL HYPERTENSION: ICD-10-CM

## 2022-10-25 RX ORDER — AMLODIPINE BESYLATE 10 MG/1
TABLET ORAL
Qty: 90 TABLET | Refills: 2 | OUTPATIENT
Start: 2022-10-25

## 2022-10-25 NOTE — TELEPHONE ENCOUNTER
Refill request for [facility] medication.      Name of Moira Merlos      Last visit - 11-15-21     Pending visit - NONE    Last refill -1-4-22      Medication Contract signed -   Herve Farris ran-         Additional Comments

## 2022-11-08 DIAGNOSIS — I10 ESSENTIAL HYPERTENSION: ICD-10-CM

## 2022-11-08 RX ORDER — AMLODIPINE BESYLATE 10 MG/1
TABLET ORAL
Qty: 90 TABLET | Refills: 2 | OUTPATIENT
Start: 2022-11-08

## 2022-11-15 DIAGNOSIS — F32.A DEPRESSION, UNSPECIFIED DEPRESSION TYPE: ICD-10-CM

## 2022-11-15 RX ORDER — FLUOXETINE HYDROCHLORIDE 40 MG/1
CAPSULE ORAL
Qty: 90 CAPSULE | OUTPATIENT
Start: 2022-11-15

## 2022-11-16 ENCOUNTER — OFFICE VISIT (OUTPATIENT)
Dept: CARDIOLOGY CLINIC | Age: 77
End: 2022-11-16
Payer: MEDICARE

## 2022-11-16 VITALS
OXYGEN SATURATION: 99 % | HEIGHT: 65 IN | SYSTOLIC BLOOD PRESSURE: 134 MMHG | WEIGHT: 244.4 LBS | BODY MASS INDEX: 40.72 KG/M2 | DIASTOLIC BLOOD PRESSURE: 72 MMHG | HEART RATE: 72 BPM

## 2022-11-16 DIAGNOSIS — I48.19 PERSISTENT ATRIAL FIBRILLATION (HCC): Primary | ICD-10-CM

## 2022-11-16 PROCEDURE — 3078F DIAST BP <80 MM HG: CPT | Performed by: INTERNAL MEDICINE

## 2022-11-16 PROCEDURE — 93000 ELECTROCARDIOGRAM COMPLETE: CPT | Performed by: INTERNAL MEDICINE

## 2022-11-16 PROCEDURE — 99214 OFFICE O/P EST MOD 30 MIN: CPT | Performed by: INTERNAL MEDICINE

## 2022-11-16 PROCEDURE — 1123F ACP DISCUSS/DSCN MKR DOCD: CPT | Performed by: INTERNAL MEDICINE

## 2022-11-16 PROCEDURE — 3074F SYST BP LT 130 MM HG: CPT | Performed by: INTERNAL MEDICINE

## 2022-11-16 NOTE — PROGRESS NOTES
[address]   Cardiac Consultation    Date: 11/16/22  Patient Name: Leopoldo Gomez  YOB: 1945    Primary Care Physician: BASSAM York NP    CHIEF COMPLAINT:   Chief Complaint   Patient presents with    3 Month Follow-Up    Atrial Fibrillation         HPI:  Leopoldo Gomez is a 69 y.o. female who presented for preop clearance for knee surgery to cardiology on 2/23/2022 and ECG demonstrated AF with HR of 68 bpm at that time. She was started on Eliquis. She underwent a cardiac cath on 3/4/2022 that demonstrated mild, nonobstructive CAD. Patient wore a cardiac event monitor from 2/23/2022 to 2/25/2022 which demonstrated persistent AF with an average HR of 73 (). The duration of the AF is unknown. She does not describe symptoms related to the AF. On 5/11/2022, she reports that she is doing ok. She is scheduled for right knee replacement for 6/1/2022. Patient underwent successful CV on 7/28/2022. Today, 11/16/2022, she reports that she is doing ok. She has now had both knees replaced. She does not feel like she is in AF. Her HR is controlled. She is taking her medications as prescribed. Denies recent issues with bleeding or bruising. Patient denies current edema, chest pain, sob, palpitations, dizziness or syncope. Past Medical History:   has a past medical history of DJD (degenerative joint disease), Hyperlipidemia, Hypertension, Hypothyroidism, Morbid obesity due to excess calories ([address] and Type II or unspecified type diabetes mellitus without mention of complication, not stated as uncontrolled. Surgical History:   has a past surgical history that includes Colonoscopy; knee surgery; Gastric bypass surgery; Hysterectomy, total abdominal; Cholecystectomy; Tubal ligation; Dilation and curettage of uterus; hernia repair; and Ovary removal.     Social History:   reports that she quit smoking about 44 years ago. Her smoking use included cigarettes.  She has a 15.00 pack-year smoking history. She has never used smokeless tobacco. She reports current alcohol use. She reports that she does not use drugs. Family History:  family history includes Breast Cancer in her maternal aunt. Home Medications:  Outpatient Encounter Medications as of 11/16/2022   Medication Sig Dispense Refill    FLUoxetine (PROZAC) 40 MG capsule Take 1 capsule by mouth as needed (anxiety) TAKE ONE CAPSULE BY MOUTH DAILY 30 capsule 2    metFORMIN (GLUCOPHAGE) 1000 MG tablet TAKE ONE TABLET BY MOUTH TWICE A DAY WITH MEALS 180 tablet 2    empagliflozin (JARDIANCE) 10 MG tablet Take 1 tablet by mouth daily 90 tablet 3    apixaban (ELIQUIS) 5 MG TABS tablet Take 1 tablet by mouth 2 times daily 180 tablet 3    amLODIPine (NORVASC) 10 MG tablet TAKE ONE TABLET BY MOUTH DAILY 90 tablet 2    levothyroxine (SYNTHROID) 150 MCG tablet TAKE ONE TABLET BY MOUTH DAILY 90 tablet 3    lisinopril-hydroCHLOROthiazide (PRINZIDE;ZESTORETIC) 20-12.5 MG per tablet Take two tablets by mouth daily 180 tablet 0    ACCU-CHEK LIBBY PLUS strip USE TO TEST ONCE DAILY 50 strip 1    famotidine (PEPCID) 10 MG tablet Take 10 mg by mouth 2 times daily      Cetirizine HCl (ZYRTEC PO) Take by mouth 2 times daily 2 BID      Insulin Pen Needle (PEN NEEDLES) 32G X 4 MM MISC USE ONCE DAILY FOR VICTOZA 100 each 2    Blood Glucose Monitoring Suppl (ACURA BLOOD GLUCOSE METER) W/DEVICE KIT Provide pt with glucometer under forumlary - ACCU-CHEK, Dx Type 2 DM, testing daily 1 kit 0    Lancets MISC 1 po daily, please provide insurance formulary brand - ACCU-CHEK, Dx Type 2 DM, testing daily 100 each 3    Vitamin D (CHOLECALCIFEROL) 1000 UNITS CAPS capsule Take 1,000 Units by mouth daily. calcium-vitamin D (OSCAL-500) 500-200 MG-UNIT per tablet Take 2 tablets by mouth daily. aspirin 81 MG tablet Take 81 mg by mouth daily.       simvastatin (ZOCOR) 20 MG tablet Take 20 mg by mouth nightly (Patient not taking: No sig reported) busPIRone (BUSPAR) 5 MG tablet TAKE ONE TABLET BY MOUTH TWICE A DAY AS NEEDED FOR ANXIETY (Patient not taking: Reported on 11/16/2022) 60 tablet 0    Liraglutide (VICTOZA) 18 MG/3ML SOPN SC injection DIAL AND INJECT SUBCUTANEOUSLY 1.2 MG DAILY (Patient not taking: Reported on 11/16/2022) 8 pen 3    clotrimazole-betamethasone (LOTRISONE) 1-0.05 % cream  (Patient not taking: No sig reported)       No facility-administered encounter medications on file as of 11/16/2022. DATA:  ECG 11/16/22: Personally reviewed. All current cardiac medications reviewed and adjusted accordingly  11/16/22    Echo 3/1/2022   Summary   Left ventricular systolic function is normal with a visually estimated   ejection fraction of 55%. The left ventricle is normal in size with moderate concentric hypertrophy. No obvious regional wall motion abnormalities noted. Elevated left atrial pressures. The right ventricle is mildly dilated with impaired systolic function. The   left atrium appears moderately enlarged. The right atrium is severely enlarged. Systolic pulmonary artery pressure (SPAP) is normal and estimated at 33 mm   of Hg with elevated right atrial pressures (8 mmHg) . Allergies:  Patient has no known allergies. Review of Systems   Constitutional: Negative. HENT: Negative. Eyes: Negative. Respiratory: Negative. Cardiovascular: Negative. Gastrointestinal: Negative. Genitourinary: Negative. Musculoskeletal: Negative. Skin: Negative. Neurological: Negative. Hematological: Negative. Psychiatric/Behavioral: Negative. /72   Pulse 72   Ht 5' 5\" (1.651 m)   Wt 244 lb 6.4 oz (110.9 kg)   SpO2 99%   BMI 40.67 kg/m²       Objective:  Physical Exam   Constitutional: She is oriented to person, place, and time. She appears well-developed and well-nourished. HENT:   Head: Normocephalic and atraumatic. Eyes: Pupils are equal, round, and reactive to light.    Neck: Normal range of motion. Cardiovascular: Normal rate, irregular rhythm and normal heart sounds. Pulmonary/Chest: Effort normal and breath sounds normal.   Abdominal: Soft. No tenderness. Musculoskeletal: Normal range of motion. She exhibits no edema. Neurological: She is alert and oriented to person, place, and time. Skin: Skin is warm and dry. Psychiatric: She has a normal mood and affect. Assessment:  1. AF-unknown duration. Rate controlled without negative dromotropic medications. This infers some element of AV block. We have recommended ambulatory ECG monitoring to evaluate her heart rate in AF. 2. CAD  3. RBBB  4. HTN  5. DM  6. AV block    Plan:  Continue current medications. Cardiac event monitor for one week. Follow up in 6 months. QUALITY MEASURES  1. Tobacco Cessation Counseling: NA  2. Retake of BP if >140/90:   NA  3. Documentation to PCP/referring for new patient:  Sent to PCP at close of office visit  4. CAD patient on anti-platelet: NA  5. CAD patient on STATIN therapy:  Yes  6. Patient with CHF and aFib on anticoagulation:  Yes    I, Juan Diego Unger RN, am scribing for and in the presence of Dr. Sandeep Mehta. 11/16/22 9:36 AM   Juan Diego Unger RN    I, Dr. Sandeep Mehta, personally performed the services described in this documentation as scribed by Juan Diego Unger RN in my presence, and it is both accurate and complete.      Sandeep Mehta M.D.

## 2022-12-09 DIAGNOSIS — I48.19 PERSISTENT ATRIAL FIBRILLATION (HCC): ICD-10-CM

## 2022-12-13 ENCOUNTER — TELEPHONE (OUTPATIENT)
Dept: CARDIOLOGY CLINIC | Age: 77
End: 2022-12-13

## 2022-12-15 ENCOUNTER — APPOINTMENT (OUTPATIENT)
Dept: GENERAL RADIOLOGY | Age: 77
End: 2022-12-15
Payer: MEDICARE

## 2022-12-15 ENCOUNTER — HOSPITAL ENCOUNTER (EMERGENCY)
Age: 77
Discharge: HOME | End: 2022-12-15
Payer: MEDICARE

## 2022-12-15 ENCOUNTER — APPOINTMENT (OUTPATIENT)
Dept: CT IMAGING | Age: 77
End: 2022-12-15
Payer: MEDICARE

## 2022-12-15 VITALS
RESPIRATION RATE: 19 BRPM | BODY MASS INDEX: 39.11 KG/M2 | DIASTOLIC BLOOD PRESSURE: 69 MMHG | OXYGEN SATURATION: 94 % | TEMPERATURE: 98 F | HEART RATE: 67 BPM | SYSTOLIC BLOOD PRESSURE: 160 MMHG | WEIGHT: 235 LBS

## 2022-12-15 DIAGNOSIS — M25.512 ACUTE PAIN OF LEFT SHOULDER: ICD-10-CM

## 2022-12-15 DIAGNOSIS — I48.91 ATRIAL FIBRILLATION, UNSPECIFIED TYPE (HCC): ICD-10-CM

## 2022-12-15 DIAGNOSIS — M54.2 NECK PAIN ON LEFT SIDE: Primary | ICD-10-CM

## 2022-12-15 DIAGNOSIS — M19.012 ARTHRITIS OF LEFT SHOULDER REGION: ICD-10-CM

## 2022-12-15 LAB
A/G RATIO: 1.2 (ref 1.1–2.2)
ALBUMIN SERPL-MCNC: 3.7 G/DL (ref 3.4–5)
ALP BLD-CCNC: 107 U/L (ref 40–129)
ALT SERPL-CCNC: 8 U/L (ref 10–40)
ANION GAP SERPL CALCULATED.3IONS-SCNC: 10 MMOL/L (ref 3–16)
AST SERPL-CCNC: 15 U/L (ref 15–37)
BASOPHILS ABSOLUTE: 0.1 K/UL (ref 0–0.2)
BASOPHILS RELATIVE PERCENT: 0.9 %
BILIRUB SERPL-MCNC: 0.4 MG/DL (ref 0–1)
BUN BLDV-MCNC: 12 MG/DL (ref 7–20)
CALCIUM SERPL-MCNC: 9.1 MG/DL (ref 8.3–10.6)
CHLORIDE BLD-SCNC: 103 MMOL/L (ref 99–110)
CO2: 26 MMOL/L (ref 21–32)
CREAT SERPL-MCNC: <0.5 MG/DL (ref 0.6–1.2)
EKG ATRIAL RATE: 340 BPM
EKG DIAGNOSIS: NORMAL
EKG Q-T INTERVAL: 398 MS
EKG QRS DURATION: 122 MS
EKG QTC CALCULATION (BAZETT): 459 MS
EKG R AXIS: -52 DEGREES
EKG T AXIS: 30 DEGREES
EKG VENTRICULAR RATE: 80 BPM
EOSINOPHILS ABSOLUTE: 0.1 K/UL (ref 0–0.6)
EOSINOPHILS RELATIVE PERCENT: 1.6 %
GFR SERPL CREATININE-BSD FRML MDRD: >60 ML/MIN/{1.73_M2}
GLUCOSE BLD-MCNC: 174 MG/DL (ref 70–99)
HCT VFR BLD CALC: 38.6 % (ref 36–48)
HEMOGLOBIN: 12.5 G/DL (ref 12–16)
LYMPHOCYTES ABSOLUTE: 1.1 K/UL (ref 1–5.1)
LYMPHOCYTES RELATIVE PERCENT: 20.1 %
MCH RBC QN AUTO: 27.2 PG (ref 26–34)
MCHC RBC AUTO-ENTMCNC: 32.3 G/DL (ref 31–36)
MCV RBC AUTO: 84.3 FL (ref 80–100)
MONOCYTES ABSOLUTE: 0.4 K/UL (ref 0–1.3)
MONOCYTES RELATIVE PERCENT: 7.4 %
NEUTROPHILS ABSOLUTE: 4 K/UL (ref 1.7–7.7)
NEUTROPHILS RELATIVE PERCENT: 70 %
PDW BLD-RTO: 15.8 % (ref 12.4–15.4)
PLATELET # BLD: 193 K/UL (ref 135–450)
PMV BLD AUTO: 9.9 FL (ref 5–10.5)
POTASSIUM SERPL-SCNC: 3.7 MMOL/L (ref 3.5–5.1)
PRO-BNP: 805 PG/ML (ref 0–449)
RBC # BLD: 4.58 M/UL (ref 4–5.2)
SODIUM BLD-SCNC: 139 MMOL/L (ref 136–145)
TOTAL PROTEIN: 6.7 G/DL (ref 6.4–8.2)
TROPONIN: <0.01 NG/ML
WBC # BLD: 5.7 K/UL (ref 4–11)

## 2022-12-15 PROCEDURE — 93005 ELECTROCARDIOGRAM TRACING: CPT | Performed by: EMERGENCY MEDICINE

## 2022-12-15 PROCEDURE — 93010 ELECTROCARDIOGRAM REPORT: CPT | Performed by: INTERNAL MEDICINE

## 2022-12-15 PROCEDURE — 71045 X-RAY EXAM CHEST 1 VIEW: CPT

## 2022-12-15 PROCEDURE — 84484 ASSAY OF TROPONIN QUANT: CPT

## 2022-12-15 PROCEDURE — 6370000000 HC RX 637 (ALT 250 FOR IP): Performed by: NURSE PRACTITIONER

## 2022-12-15 PROCEDURE — 72125 CT NECK SPINE W/O DYE: CPT

## 2022-12-15 PROCEDURE — 73030 X-RAY EXAM OF SHOULDER: CPT

## 2022-12-15 PROCEDURE — 80053 COMPREHEN METABOLIC PANEL: CPT

## 2022-12-15 PROCEDURE — 85025 COMPLETE CBC W/AUTO DIFF WBC: CPT

## 2022-12-15 PROCEDURE — 83880 ASSAY OF NATRIURETIC PEPTIDE: CPT

## 2022-12-15 PROCEDURE — 99285 EMERGENCY DEPT VISIT HI MDM: CPT

## 2022-12-15 RX ORDER — METHOCARBAMOL 750 MG/1
750 TABLET, FILM COATED ORAL 4 TIMES DAILY PRN
Qty: 40 TABLET | Refills: 0 | Status: SHIPPED | OUTPATIENT
Start: 2022-12-15 | End: 2022-12-25

## 2022-12-15 RX ORDER — ASPIRIN 81 MG/1
324 TABLET, CHEWABLE ORAL ONCE
Status: COMPLETED | OUTPATIENT
Start: 2022-12-15 | End: 2022-12-15

## 2022-12-15 RX ORDER — LIDOCAINE 4 G/G
1 PATCH TOPICAL DAILY
Status: DISCONTINUED | OUTPATIENT
Start: 2022-12-15 | End: 2022-12-15 | Stop reason: HOSPADM

## 2022-12-15 RX ORDER — METHOCARBAMOL 500 MG/1
1000 TABLET, FILM COATED ORAL ONCE
Status: COMPLETED | OUTPATIENT
Start: 2022-12-15 | End: 2022-12-15

## 2022-12-15 RX ORDER — LIDOCAINE 50 MG/G
1 PATCH TOPICAL DAILY
Qty: 10 PATCH | Refills: 0 | Status: SHIPPED | OUTPATIENT
Start: 2022-12-15 | End: 2022-12-25

## 2022-12-15 RX ORDER — ACETAMINOPHEN 500 MG
1000 TABLET ORAL 3 TIMES DAILY PRN
Qty: 180 TABLET | Refills: 0 | Status: SHIPPED | OUTPATIENT
Start: 2022-12-15

## 2022-12-15 RX ORDER — ACETAMINOPHEN 500 MG
1000 TABLET ORAL ONCE
Status: COMPLETED | OUTPATIENT
Start: 2022-12-15 | End: 2022-12-15

## 2022-12-15 RX ADMIN — METHOCARBAMOL TABLETS 1000 MG: 500 TABLET, COATED ORAL at 12:14

## 2022-12-15 RX ADMIN — ACETAMINOPHEN 1000 MG: 500 TABLET ORAL at 12:14

## 2022-12-15 RX ADMIN — ASPIRIN 324 MG: 81 TABLET, CHEWABLE ORAL at 12:13

## 2022-12-15 ASSESSMENT — ENCOUNTER SYMPTOMS
VOMITING: 0
DIARRHEA: 0
ABDOMINAL PAIN: 0
BACK PAIN: 0
COUGH: 0
SORE THROAT: 0
NAUSEA: 0
EYE PAIN: 0
SHORTNESS OF BREATH: 0

## 2022-12-15 ASSESSMENT — PAIN DESCRIPTION - LOCATION: LOCATION: NECK

## 2022-12-15 ASSESSMENT — PAIN - FUNCTIONAL ASSESSMENT: PAIN_FUNCTIONAL_ASSESSMENT: NONE - DENIES PAIN

## 2022-12-15 ASSESSMENT — PAIN SCALES - GENERAL: PAINLEVEL_OUTOF10: 2

## 2022-12-15 NOTE — ED PROVIDER NOTES
[address] [facility]  ED  EMERGENCY DEPARTMENT ENCOUNTER        Pt Name: Arnie Dias  MRN: [id number]  Pavangfmaximo [birth year]  Date of evaluation: 12/15/2022  Provider: Derenda Bloch, APRN - CNP  PCP: BASSAM Gonzales NP  Note Started: 12:11 PM EST 12/15/22      MABEL. I have evaluated this patient. My supervising physician was available for consultation. CHIEF COMPLAINT       Chief Complaint   Patient presents with    Irregular Heart Beat     Pain radiating into neck and left arm, hx of a-fib       HISTORY OF PRESENT ILLNESS   (Location, Timing/Onset, Context/Setting, Quality, Duration, Modifying Factors, Severity, Associated Signs and Symptoms)  Note limiting factors. Chief Complaint: Left neck and shoulder pain    Arnie Dias is a 69 y.o. female who presents to the emergency department with complaints of left neck and shoulder pain, now is extending down into the anterior portion of her shoulder and just subclavicular Mikayla Crigler. No substernal chest pain. No shortness of breath. She states that she was concerned as she has had a history of paroxysmal A. fib. Has seen Dr. Tash Ibanez before in the past with cardiology for this. She states that she thought she just slept wrong on her neck 1 night, but given that it is radiating down into the left shoulder and anterior upper portion of her chest she wanted to get checked out from the emergency room. She again has no anterior chest pain, no shortness of breath, no calf pain or swelling. Denies any cough can get chest in, fevers or chills. Denies any numbness or tingling in any extremities. Pain is mild, worsens with movements. Came to the ED for further evaluation and treatment. Nursing Notes were all reviewed and agreed with or any disagreements were addressed in the HPI. REVIEW OF SYSTEMS    (2-9 systems for level 4, 10 or more for level 5)     Review of Systems   Constitutional:  Negative for chills and fever.    HENT: Negative for congestion and sore throat. Eyes:  Negative for pain and visual disturbance. Respiratory:  Negative for cough and shortness of breath. Cardiovascular:  Negative for chest pain and leg swelling. Gastrointestinal:  Negative for abdominal pain, diarrhea, nausea and vomiting. Genitourinary:  Negative for dysuria and hematuria. Musculoskeletal:  Positive for arthralgias, myalgias and neck pain. Negative for back pain and neck stiffness. Skin:  Negative for rash and wound. Neurological:  Negative for dizziness and light-headedness. All other systems reviewed and are negative. Positives and Pertinent negatives as per HPI. Except as noted above in the ROS, all other systems were reviewed and negative.        PAST MEDICAL HISTORY     Past Medical History:   Diagnosis Date    DJD (degenerative joint disease)     Hyperlipidemia     Hypertension     Hypothyroidism     Morbid obesity due to excess calories ([facility] [address] 2/11/2016    Type II or unspecified type diabetes mellitus without mention of complication, not stated as uncontrolled          SURGICAL HISTORY     Past Surgical History:   Procedure Laterality Date    CHOLECYSTECTOMY      COLONOSCOPY      DILATION AND CURETTAGE OF UTERUS      GASTRIC BYPASS SURGERY      HERNIA REPAIR      HYSTERECTOMY, TOTAL ABDOMINAL (CERVIX REMOVED)      KNEE SURGERY      OVARY REMOVAL      TUBAL LIGATION           CURRENTMEDICATIONS       Discharge Medication List as of 12/15/2022  2:31 PM        CONTINUE these medications which have NOT CHANGED    Details   FLUoxetine (PROZAC) 40 MG capsule Take 1 capsule by mouth as needed (anxiety) TAKE ONE CAPSULE BY MOUTH DAILY, Disp-30 capsule, R-2Normal      metFORMIN (GLUCOPHAGE) 1000 MG tablet TAKE ONE TABLET BY MOUTH TWICE A DAY WITH MEALS, Disp-180 tablet, R-2Normal      empagliflozin (JARDIANCE) 10 MG tablet Take 1 tablet by mouth daily, Disp-90 tablet, R-3Normal      simvastatin (ZOCOR) 20 MG tablet Take 20 mg by mouth nightlyHistorical Med      apixaban (ELIQUIS) 5 MG TABS tablet Take 1 tablet by mouth 2 times daily, Disp-180 tablet, R-3Print      amLODIPine (NORVASC) 10 MG tablet TAKE ONE TABLET BY MOUTH DAILY, Disp-90 tablet, R-2Normal      levothyroxine (SYNTHROID) 150 MCG tablet TAKE ONE TABLET BY MOUTH DAILY, Disp-90 tablet, R-3Normal      lisinopril-hydroCHLOROthiazide (PRINZIDE;ZESTORETIC) 20-12.5 MG per tablet Take two tablets by mouth daily, Disp-180 tablet, R-0Normal      busPIRone (BUSPAR) 5 MG tablet TAKE ONE TABLET BY MOUTH TWICE A DAY AS NEEDED FOR ANXIETY, Disp-60 tablet, R-0Normal      ACCU-CHEK LIBBY PLUS strip USE TO TEST ONCE DAILY, Disp-50 strip, R-1Normal      famotidine (PEPCID) 10 MG tablet Take 10 mg by mouth 2 times dailyHistorical Med      Liraglutide (VICTOZA) 18 MG/3ML SOPN SC injection DIAL AND INJECT SUBCUTANEOUSLY 1.2 MG DAILY, Disp-8 pen, R-3Normal      Cetirizine HCl (ZYRTEC PO) Take by mouth 2 times daily 2 BIDHistorical Med      clotrimazole-betamethasone (LOTRISONE) 1-0.05 % cream Historical Med      Insulin Pen Needle (PEN NEEDLES) 32G X 4 MM MISC USE ONCE DAILY FOR VICTOZA, Disp-100 each, R-2Normal      Blood Glucose Monitoring Suppl (ACURA BLOOD GLUCOSE METER) W/DEVICE KIT Disp-1 kit, R-0, PrintProvide pt with glucometer under forumlary - ACCU-CHEK, Dx Type 2 DM, testing daily      Lancets MISC Disp-100 each, R-3, Print1 po daily, please provide insurance formulary brand - ACCU-CHEK, Dx Type 2 DM, testing daily      Vitamin D (CHOLECALCIFEROL) 1000 UNITS CAPS capsule Take 1,000 Units by mouth daily. Historical Med      calcium-vitamin D (OSCAL-500) 500-200 MG-UNIT per tablet Take 2 tablets by mouth daily. Historical Med      aspirin 81 MG tablet Take 81 mg by mouth daily. Historical Med               ALLERGIES     Patient has no known allergies.     FAMILYHISTORY       Family History   Problem Relation Age of Onset    Breast Cancer Maternal Aunt           SOCIAL HISTORY       Social History Tobacco Use    Smoking status: Former     Packs/day: 1.00     Years: 15.00     Pack years: 15.00     Types: Cigarettes     Quit date: 1978     Years since quittin.9    Smokeless tobacco: Never   Vaping Use    Vaping Use: Never used   Substance Use Topics    Alcohol use: Yes     Comment: occasionally    Drug use: No       SCREENINGS        Cristal Coma Scale  Eye Opening: Spontaneous  Best Verbal Response: Oriented  Best Motor Response: Obeys commands  [place] Coma Scale Score: 15                CIWA Assessment  BP: (!) 160/69  Heart Rate: 67             PHYSICAL EXAM    (up to 7 for level 4, 8 or more for level 5)     ED Triage Vitals [12/15/22 1134]   BP Temp Temp Source Heart Rate Resp SpO2 Height Weight   (!) 171/86 98 °F (36.7 °C) Oral 75 18 97 % -- 235 lb (106.6 kg)       Physical Exam  Vitals and nursing note reviewed. Constitutional:       General: She is not in acute distress. Appearance: Normal appearance. She is not ill-appearing, toxic-appearing or diaphoretic. HENT:      Head: Normocephalic and atraumatic. No raccoon eyes, Paris's sign, right periorbital erythema or left periorbital erythema. Right Ear: Hearing and external ear normal.      Left Ear: Hearing and external ear normal.      Nose: Nose normal. No laceration, nasal tenderness, mucosal edema, congestion or rhinorrhea. Right Nostril: No epistaxis. Left Nostril: No epistaxis. Mouth/Throat:      Lips: Pink. No lesions. Mouth: Mucous membranes are moist.      Tongue: No lesions. Tongue does not deviate from midline. Pharynx: Oropharynx is clear. Uvula midline. No pharyngeal swelling, oropharyngeal exudate, posterior oropharyngeal erythema or uvula swelling. Tonsils: No tonsillar exudate or tonsillar abscesses. Eyes:      General: Lids are normal.         Right eye: No discharge. Left eye: No discharge. Extraocular Movements: Extraocular movements intact.    Neck:      Trachea: Phonation normal. No abnormal tracheal secretions or tracheal deviation. Comments: No meningismus   Cardiovascular:      Rate and Rhythm: Normal rate and regular rhythm. Pulses: Normal pulses. Heart sounds: Normal heart sounds. No murmur heard. No friction rub. No gallop. Pulmonary:      Effort: Pulmonary effort is normal. No respiratory distress. Breath sounds: Normal breath sounds. No stridor. No wheezing, rhonchi or rales. Abdominal:      General: Bowel sounds are normal. There is no distension. Palpations: Abdomen is soft. Tenderness: There is no abdominal tenderness. There is no guarding or rebound. Musculoskeletal:         General: Normal range of motion. Left shoulder: Tenderness present. No swelling, deformity, effusion, laceration, bony tenderness or crepitus. Normal range of motion. Normal strength. Normal pulse. Left upper arm: Normal.      Cervical back: Normal range of motion and neck supple. Tenderness present. No swelling, edema, deformity, erythema, signs of trauma, lacerations, rigidity, torticollis or bony tenderness. Pain with movement present. No spinous process tenderness or muscular tenderness. Normal range of motion. Thoracic back: Normal.      Lumbar back: Normal.        Back:       Right lower leg: No edema. Left lower leg: No edema. Comments: Left trapezius muscle tenderness and left cervical paraspinous tenderness that extends dens down to the left anterior shoulder and just subclavicular on the upper chest wall. It is reproducible. No crepitus or ecchymosis or deformities in these regions. Negative for any AC joint tenderness. Negative arc test.  Is moving all 4 extremities spontaneously and equally. Bilateral radial pulses are equal, 2+. Capillary refill less than 3 seconds. Lymphadenopathy:      Cervical: No cervical adenopathy. Skin:     General: Skin is warm and dry.       Capillary Refill: Capillary refill takes less than 2 seconds. Neurological:      General: No focal deficit present. Mental Status: She is alert and oriented to person, place, and time. GCS: GCS eye subscore is 4. GCS verbal subscore is 5. GCS motor subscore is 6. Cranial Nerves: No cranial nerve deficit, dysarthria or facial asymmetry. Sensory: Sensation is intact. No sensory deficit. Motor: Motor function is intact. No weakness. Coordination: Coordination normal.      Gait: Gait is intact. Gait normal.   Psychiatric:         Mood and Affect: Mood normal.         Behavior: Behavior normal.         Thought Content: Thought content normal.       DIAGNOSTIC RESULTS   LABS:    Labs Reviewed   CBC WITH AUTO DIFFERENTIAL - Abnormal; Notable for the following components:       Result Value    RDW 15.8 (*)     All other components within normal limits   COMPREHENSIVE METABOLIC PANEL - Abnormal; Notable for the following components:    Glucose 174 (*)     Creatinine <0.5 (*)     ALT 8 (*)     All other components within normal limits   BRAIN NATRIURETIC PEPTIDE - Abnormal; Notable for the following components:    Pro- (*)     All other components within normal limits   TROPONIN   URINALYSIS WITH REFLEX TO CULTURE       When ordered only abnormal lab results are displayed. All other labs were within normal range or not returned as of this dictation. EKG: When ordered, EKG's are interpreted by the Emergency Department Physician in the absence of a cardiologist.  Please see their note for interpretation of EKG. RADIOLOGY:   Non-plain film images such as CT, Ultrasound and MRI are read by the radiologist. Plain radiographic images are visualized and preliminarily interpreted by the ED Provider with the below findings:        Interpretation per the Radiologist below, if available at the time of this note:    CT CSpine W/O Contrast   Final Result   1. No acute cervical spine fracture.    2. Mild multilevel degenerative disc disease and moderate multilevel facet   arthropathy with no critical areas of central canal or neural foraminal   narrowing identified. 3. Kyphosis of the spine which could be related to patient positioning,   degenerative changes or muscle spasm. 4. Bilateral carotid arterial calcifications. XR SHOULDER LEFT (MIN 2 VIEWS)   Final Result   No acute fracture dislocation. Mild degenerative changes of the acromioclavicular joint. Osteopenia. XR CHEST PORTABLE   Final Result   1. No acute abnormality. No results found. No results found. PROCEDURES   Unless otherwise noted below, none     Procedures    CRITICAL CARE TIME   CRITICAL CARE NOTE:    Martin Go CNP am the primary clinician of record. There was a high probability of clinically significant life-threatening deterioration of the patient's condition requiring my urgent intervention. Total critical care time was at least 21 minutes. Of nonconcurrent critical care time. This includes vital sign monitoring, pulse oximetry monitoring, telemetry monitoring, clinical response to the IV medications, reviewing the nursing notes, consultation time, dictation/documentation time, and interpretation of the labwork. This excludes any separately billable procedures performed.     CONSULTS:  None      EMERGENCY DEPARTMENT COURSE and DIFFERENTIAL DIAGNOSIS/MDM:   Vitals:    Vitals:    12/15/22 1134 12/15/22 1307 12/15/22 1409 12/15/22 1411   BP: (!) 171/86 (!) 151/77  (!) 160/69   Pulse: 75 67 65 67   Resp: 18 19 19 19   Temp: 98 °F (36.7 °C)      TempSrc: Oral      SpO2: 97% 96% 94% 94%   Weight: 235 lb (106.6 kg)          Patient was given the following medications:  Medications   lidocaine 4 % external patch 1 patch (1 patch TransDERmal Patch Applied 12/15/22 1215)   methocarbamol (ROBAXIN) tablet 1,000 mg (1,000 mg Oral Given 12/15/22 1214)   acetaminophen (TYLENOL) tablet 1,000 mg (1,000 mg Oral Given 12/15/22 1214)   aspirin chewable tablet 324 mg (324 mg Oral Given 12/15/22 1213)         Is this patient to be included in the SEP-1 Core Measure due to severe sepsis or septic shock? No   Exclusion criteria - the patient is NOT to be included for SEP-1 Core Measure due to: Infection is not suspected    MDM: See HPI and above for full presentation and physical exam.  Patient is a very pleasant 67-year-old female that presents to the ED today with left-sided neck and shoulder pain that is now extending to the left upper chest wall just under the subclavicular region. Differential diagnoses included but not limited to ACS, arrhythmia, pneumonia, musculoskeletal pain, radiculopathy, degenerative disc disease, cauda equina, spinal epidural abscess, ligamental injury, other    Patient has a very reproducible left cervical paraspinous tenderness that extends down into the left trapezius muscle. It is reproducible. Worsens with movements. She wanted to be checked out as it extends down into the left upper anterior chest wall, just under the subclavicular region. No substernal or chest pain otherwise. No shortness of breath. Her physical exam is reassuring. Does not appear cardiac in nature, given her history however of A. fib will do a cardiac work-up for reassurance. Her blood work shows no leukocytosis or anemia. She does appear to be A. fib on the monitor, however is controlled with heart rate in the 70s and 80s. She has no palpitations. Blood work otherwise shows no significant electrolyte derangements or TIMOTHY. Her troponin is negative. . Plain films of the chest and shoulder show no acute abnormalities as read by the radiologist and reviewed per myself    CT of the cervical spine as read by the radiologist as above    Her symptoms on reevaluation are not dramatically improved.   Her pain is likely secondary from her likely musculoskeletal strain on the left cervical paraspinous region and left trapezius muscle. I explained to the patient she should follow-up with Dr. Birdie Miranda the cardiologist as well as her primary care. She is to return immediately for any new or worsening symptoms. She verbalized understanding, we did discuss possible admission versus discharge she is in agreement with the plan of discharge with strict return parameters. She has no further questions or concerns. Will be discharged home with a muscle relaxant, APAP and lidocaine patches as needed for her symptoms. She is remained afebrile and hemodynamically stable throughout her entire ED course and will be discharged home in stable condition    I estimate there is LOW risk for ACUTE CORONARY SYNDROME, OR THORACIC AORTIC DISSECTION, CAUDA EQUINA or CENTRAL CORD SYNDROME, EPIDURAL MASS LESION, MENINGITIS, SPINAL STENOSIS, OR HERNIATED DISK CAUSING SEVERE STENOSIS, thus I consider the discharge disposition reasonable. Christine Henley and I have discussed the diagnosis and risks, and we agree with discharging home to follow-up with their primary doctor. We also discussed returning to the Emergency Department immediately if new or worsening symptoms occur. We have discussed the symptoms which are most concerning (e.g., saddle anesthesia, urinary or bowel incontinence or retention, changing or worsening pain) that necessitate immediate return      FINAL IMPRESSION      1. Neck pain on left side    2. Acute pain of left shoulder    3. Arthritis of left shoulder region    4.  Atrial fibrillation, unspecified type [facility])          DISPOSITION/PLAN   DISPOSITION Decision To Discharge 12/15/2022 02:26:30 PM      PATIENT REFERRED TO:  BASSAM Conti NP  [address] [place] [address] [address] [place] [address]  [phone]    Schedule an appointment as soon as possible for a visit in 3 days      Stuart Toribio MD  [address] [place] [address], [address] [place] [address]  [phone]    Schedule an appointment as soon as possible for a visit in 1 days      [facility]  ED  [address]  Go to   As needed, If symptoms worsen    DISCHARGE MEDICATIONS:  Discharge Medication List as of 12/15/2022  2:31 PM        START taking these medications    Details   lidocaine (LIDODERM) 5 % Place 1 patch onto the skin daily for 10 days 12 hours on, 12 hours off., Disp-10 patch, R-0Normal      acetaminophen (TYLENOL) 500 MG tablet Take 2 tablets by mouth 3 times daily as needed for Pain, Disp-180 tablet, R-0Normal      methocarbamol (ROBAXIN-750) 750 MG tablet Take 1 tablet by mouth 4 times daily as needed (muscle pain/spasm), Disp-40 tablet, R-0Normal             DISCONTINUED MEDICATIONS:  Discharge Medication List as of 12/15/2022  2:31 PM                 (Please note that portions of this note were completed with a voice recognition program.  Efforts were made to edit the dictations but occasionally words are mis-transcribed.)    BASSAM Mcdonald CNP (electronically signed)            BASSAM Mcdonald CNP  12/15/22 1500

## 2023-01-10 RX ORDER — LEVOTHYROXINE SODIUM 0.15 MG/1
TABLET ORAL
Qty: 90 TABLET | Refills: 3 | OUTPATIENT
Start: 2023-01-10

## 2023-02-24 ENCOUNTER — TELEPHONE (OUTPATIENT)
Dept: CARDIOLOGY CLINIC | Age: 78
End: 2023-02-24

## 2023-02-24 NOTE — TELEPHONE ENCOUNTER
Pt dropped of medication assistance form for [facility] to fill out and fax. Forms have been placed in [facility] folder. Thank you.

## 2023-03-02 NOTE — TELEPHONE ENCOUNTER
Pt called to inform the office that [address] [place] [address] Patient Assistant stated that they did received the form but there was no mg listed. Please re-send the form to [facility] and put them mg,  Per Pt 10mg and initial it. Please advise.   Thank you

## 2023-03-27 ENCOUNTER — OFFICE VISIT (OUTPATIENT)
Dept: CARDIOLOGY CLINIC | Age: 78
End: 2023-03-27
Payer: MEDICARE

## 2023-03-27 VITALS
WEIGHT: 235 LBS | SYSTOLIC BLOOD PRESSURE: 140 MMHG | BODY MASS INDEX: 39.15 KG/M2 | HEART RATE: 82 BPM | DIASTOLIC BLOOD PRESSURE: 80 MMHG | OXYGEN SATURATION: 95 % | HEIGHT: 65 IN

## 2023-03-27 DIAGNOSIS — I48.19 PERSISTENT ATRIAL FIBRILLATION (HCC): ICD-10-CM

## 2023-03-27 DIAGNOSIS — I25.10 CORONARY ARTERY DISEASE, NON-OCCLUSIVE: Primary | ICD-10-CM

## 2023-03-27 DIAGNOSIS — I10 ESSENTIAL HYPERTENSION: ICD-10-CM

## 2023-03-27 PROCEDURE — 1123F ACP DISCUSS/DSCN MKR DOCD: CPT | Performed by: INTERNAL MEDICINE

## 2023-03-27 PROCEDURE — 3079F DIAST BP 80-89 MM HG: CPT | Performed by: INTERNAL MEDICINE

## 2023-03-27 PROCEDURE — 99214 OFFICE O/P EST MOD 30 MIN: CPT | Performed by: INTERNAL MEDICINE

## 2023-03-27 PROCEDURE — 3077F SYST BP >= 140 MM HG: CPT | Performed by: INTERNAL MEDICINE

## 2023-03-27 RX ORDER — ATORVASTATIN CALCIUM 20 MG/1
TABLET, FILM COATED ORAL
COMMUNITY
Start: 2023-02-01

## 2023-03-27 RX ORDER — OXYBUTYNIN CHLORIDE 5 MG/1
TABLET, EXTENDED RELEASE ORAL
COMMUNITY
Start: 2023-01-09

## 2023-03-27 NOTE — PROGRESS NOTES
CARDIOLOGY FOLLOW UP         Patient Name: Scotty Prado  Primary Care physician: BASSAM Turner NP    Reason for Referral/Chief Complaint: Scotty Prado is a 69 y.o. patient who is referred to cardiology clinic today for evaluation and treatment of non obstructive CAD. History of Present Illness:   Scotty Prado 69 y.o. female with a medical history notable for hypertension, hyperlipidemia, Type 2 diabetes mellitus, persistent atrial fibrillation, non obstructive CAD (Cath 3/4/22),  RBBB, AV block, presenting today to transfer cardiac care for ease of travel. Patient follows with my partner Kimberley Miller, electrophysiology. Patient first evaluated 2/23/2022 and ECG demonstrated AF with HR of 68 bpm at that time. She was started on Eliquis. She underwent a cardiac cath on 3/4/2022 that demonstrated mild-moderate, nonobstructive CAD. Patient wore a cardiac event monitor from 2/23/2022 to 2/25/2022 which demonstrated persistent AF with an average HR of 73 (). The duration of the AF is unknown. Patient underwent successful CV on 7/28/2022. She was noted to have recurrence of atrial fibrillation in follow-up. Persistent by repeat monitor. Rate control strategy elected it appears per EP. In the interim, patient presents today to establish care [place]. Previously following with Dr. Josefina Sandoval at [facility]. She notes has been doing well in the interim. With current level of activity, the patient denies chest pain. She does note chronic fatigue and dyspnea with exertion that seems stable. Denies palpitations, dizziness, near-syncope or felisha syncope. Does states she has some imbalance and tinnitus chronically that may affect that. Denies paroxysmal nocturnal dyspnea, orthopnea, increasing lower extremity edema or weight gain. States she recently got back from vacation. She is having her diabetes checked with her PCP later this week.     The
function. 4.  Elevated LVEDP. Impression and Plan:     Coronary artery disease without angina              -Mild LAD and RCA disease, moderate Lcx on [facility] 3/2022    Persistent atrial fibrillation  -Rate controlled without negative dromotropic medications. This infers some element of AV block per EP.                 -[id number] score: 10 (age, gender, HTN CAD, DM)               -s/p DCCV 7/28/2022 but with recurrence; rate controlled. RBBB   Hyperlipidemia  Hypertension  Hypothyroidism  Diabetes mellitus  Obesity with BMI 39        Patient Active Problem List   Diagnosis    DJD (degenerative joint disease)    Osteopenia    Essential hypertension    Acquired hypothyroidism    Morbid obesity due to excess calories ([facility] [address]    Type 2 diabetes mellitus without complication, without long-term current use of insulin (HCC)    Primary osteoarthritis of right knee    Mixed hyperlipidemia    Vitamin D deficiency    Precordial pain    Persistent atrial fibrillation (HCC)    Coronary artery disease, non-occlusive      PLAN:  Mediterranean Diet  - print out given  No change in  medications today; continue aspirin, statin, antihypertensive regimen  No cardiac testing warranted at this time   Labs - fasting cholesterol next available; We will call you with the results  Recommend checking your blood pressure at home. Once in the morning x 2 weeks. Keep a log and call the office to discuss. Goal BP is 140/90 or below. Follow up in 1 year     Scribe's attestation: This note was scribed in the presence of Dr. Rondall Moritz, MD by Rachel Singh RN      The scribes documentation has been prepared under my direction and personally reviewed by me in its entirety. I confirm that the note above accurately reflects all work, treatment, procedures, and medical decision making performed by me.   Aggie Mcdowell MD, personally performed the services described in this documentation as scribed by  Rachel Singh RN in my presence, and

## 2023-03-27 NOTE — PATIENT INSTRUCTIONS
PLAN:  Mediterranean Diet  - print out given  No change in  medications today  No cardiac testing warranted at this time   Labs - fasting cholesterol   We will call you with the results  Recommend checking your blood pressure at home. Once in the morning x 2 weeks. Keep a log. Goal BP is 140/90 or below.

## 2023-04-25 ENCOUNTER — OFFICE VISIT (OUTPATIENT)
Dept: ENT CLINIC | Age: 78
End: 2023-04-25
Payer: MEDICARE

## 2023-04-25 ENCOUNTER — PROCEDURE VISIT (OUTPATIENT)
Dept: AUDIOLOGY | Age: 78
End: 2023-04-25
Payer: MEDICARE

## 2023-04-25 VITALS — TEMPERATURE: 97.5 F | WEIGHT: 228 LBS | BODY MASS INDEX: 36.64 KG/M2 | HEIGHT: 66 IN | RESPIRATION RATE: 16 BRPM

## 2023-04-25 DIAGNOSIS — H93.13 TINNITUS OF BOTH EARS: ICD-10-CM

## 2023-04-25 DIAGNOSIS — H90.3 SENSORINEURAL HEARING LOSS (SNHL) OF BOTH EARS: Primary | ICD-10-CM

## 2023-04-25 DIAGNOSIS — R42 DIZZINESS: ICD-10-CM

## 2023-04-25 DIAGNOSIS — R42 DIZZINESS AND GIDDINESS: ICD-10-CM

## 2023-04-25 DIAGNOSIS — Z01.10 ENCOUNTER FOR HEARING EXAMINATION, UNSPECIFIED WHETHER ABNORMAL FINDINGS: Primary | ICD-10-CM

## 2023-04-25 DIAGNOSIS — H90.3 SENSORINEURAL HEARING LOSS, BILATERAL: Primary | ICD-10-CM

## 2023-04-25 DIAGNOSIS — R42 LIGHT HEADED: ICD-10-CM

## 2023-04-25 DIAGNOSIS — H93.19 TINNITUS, UNSPECIFIED LATERALITY: ICD-10-CM

## 2023-04-25 PROCEDURE — 92557 COMPREHENSIVE HEARING TEST: CPT | Performed by: AUDIOLOGIST

## 2023-04-25 PROCEDURE — 92567 TYMPANOMETRY: CPT | Performed by: AUDIOLOGIST

## 2023-04-25 PROCEDURE — 99204 OFFICE O/P NEW MOD 45 MIN: CPT | Performed by: STUDENT IN AN ORGANIZED HEALTH CARE EDUCATION/TRAINING PROGRAM

## 2023-04-25 PROCEDURE — 1123F ACP DISCUSS/DSCN MKR DOCD: CPT | Performed by: STUDENT IN AN ORGANIZED HEALTH CARE EDUCATION/TRAINING PROGRAM

## 2023-04-25 ASSESSMENT — ENCOUNTER SYMPTOMS
VOMITING: 0
RHINORRHEA: 0
NAUSEA: 0
SHORTNESS OF BREATH: 0
EYE PAIN: 0
COUGH: 0

## 2023-04-25 NOTE — PROGRESS NOTES
Celeste Camargo   [birth year], 69 y.o. female   [id number]       Referring Provider: Kristen Wade DO  Referral Type: In an order in [address] [place] [address]    Reason for Visit: Evaluation of the cause of disorders of hearing, tinnitus, or balance. ADULT AUDIOLOGIC EVALUATION      Celeste Camargo is a 69 y.o. female seen today, 4/25/2023 , for an initial audiologic evaluation. Patient was seen by Kristen Wade DO following today's evaluation. Patient was alone. AUDIOLOGIC AND OTHER PERTINENT MEDICAL HISTORY:      Celeste Camargo noted tinnitus and imbalance. Patient reports constant bilateral tinnitus for several years. She has experienced feeling imbalance triggered by bending over or being on unstable ground. Medical history is reportedly significant for knee replacement surgery and atrial fibrillation. Celeste Camargo denied otalgia, aural fullness, history of occupational/recreational noise exposure, history of head trauma, history of ear surgery, and family history of hearing loss. Date: 4/25/2023     IMPRESSIONS:      Normal middle ear pressure and compliance, bilaterally. Abnormal high frequency hearing sensitivity, bilaterally, which coincide with patient's perceived tinnitus. Word understanding was excellent presented at elevated sensation levels, bilaterally. Follow medical recommendations of Pedro Chester DO.     ASSESSMENT AND FINDINGS:     Otoscopy revealed: occluding cerumen in the right ear. Cerumen removed without incidence via curette. Clear ear canals bilaterally    RIGHT EAR:  Hearing Sensitivity: Normal hearing sensitivity to a moderately severe sensorineural hearing loss from 250-8000 Hz  Speech Recognition Threshold: 25 dB HL  Word Recognition:Excellent (96%), based on NU-6 25-word list at 65 dBHL using recorded speech stimuli. Tympanometry: Normal peak pressure and compliance, Type A tympanogram, consistent with normal middle ear function.       LEFT EAR:  Hearing Sensitivity: Normal hearing

## 2023-04-25 NOTE — PROGRESS NOTES
[facility]      Patient Name: Rufino Garcia Record Number:  [id number]  Primary Care Physician:  BASSAM Corona NP  Date of Consultation: 4/25/2023    Chief Complaint:   Chief Complaint   Patient presents with    Tinnitus     She has had ringing for years but decided to get tested      [facility]  Paul Morrison is a(n) 69 y.o. female who presents evaluation of tinnitus. She states she has had ringing in ears for years but recently worsened and she like it evaluated. She also has some imbalance but recently got bilateral knee replacements and feels that it is secondary to that. Patient Active Problem List   Diagnosis    DJD (degenerative joint disease)    Osteopenia    Essential hypertension    Acquired hypothyroidism    Morbid obesity due to excess calories (HCC)    Type 2 diabetes mellitus without complication, without long-term current use of insulin (HCC)    Primary osteoarthritis of right knee    Mixed hyperlipidemia    Vitamin D deficiency    Precordial pain    Persistent atrial fibrillation (HCC)    Coronary artery disease, non-occlusive     Past Surgical History:   Procedure Laterality Date    CHOLECYSTECTOMY      COLONOSCOPY      DILATION AND CURETTAGE OF UTERUS      GASTRIC BYPASS SURGERY      HERNIA REPAIR      HYSTERECTOMY, TOTAL ABDOMINAL (CERVIX REMOVED)      KNEE SURGERY      OVARY REMOVAL      TUBAL LIGATION       Family History   Problem Relation Age of Onset    Breast Cancer Maternal Aunt      Social History     Socioeconomic History    Marital status:       Spouse name: Not on file    Number of children: Not on file    Years of education: Not on file    Highest education level: Not on file   Occupational History    Not on file   Tobacco Use    Smoking status: Former     Packs/day: 1.00     Years: 15.00     Pack years: 15.00     Types: Cigarettes     Quit date: 1/1/1978     Years since quitting:

## 2023-04-26 ENCOUNTER — TELEPHONE (OUTPATIENT)
Dept: CARDIOLOGY CLINIC | Age: 78
End: 2023-04-26

## 2023-04-26 NOTE — TELEPHONE ENCOUNTER
Called and spoke with pt, she stated she has not been having any symptoms and feels great.  She will drop off the actual log tomorrow 04/27, to see bp trend for asif to review

## 2023-04-26 NOTE — TELEPHONE ENCOUNTER
Pt called and stated that CHRIS wanted her to call in Highest and lowest BP readings:    Highest reading on 4/15/23 150/66  Lowest reading on 4/25 125/71    Please contact Pt at [phone] if you have any questions.

## 2023-04-27 NOTE — TELEPHONE ENCOUNTER
Pt accidentally throw away her B/P log. Pt is unable to retrieve from machine now. Pt will restart log.

## 2023-05-17 ENCOUNTER — TELEPHONE (OUTPATIENT)
Dept: CARDIOLOGY CLINIC | Age: 78
End: 2023-05-17

## 2023-05-17 DIAGNOSIS — I10 ESSENTIAL HYPERTENSION: Primary | ICD-10-CM

## 2023-05-19 RX ORDER — SPIRONOLACTONE 25 MG/1
12.5 TABLET ORAL DAILY
Qty: 45 TABLET | Refills: 1 | Status: SHIPPED | OUTPATIENT
Start: 2023-05-19

## 2023-05-31 NOTE — PROGRESS NOTES
total abdominal; Cholecystectomy; Tubal ligation; Dilation and curettage of uterus; hernia repair; and Ovary removal.     Home Medications:    Prior to Admission medications    Medication Sig Start Date End Date Taking?  Authorizing Provider   spironolactone (ALDACTONE) 25 MG tablet Take 0.5 tablets by mouth daily 5/19/23  Yes Jennifer Burgess MD   atorvastatin (LIPITOR) 20 MG tablet  2/1/23  Yes Historical Provider, MD   apixaban (ELIQUIS) 5 MG TABS tablet Take 1 tablet by mouth 2 times daily 3/27/23  Yes Jennifer Burgess MD   acetaminophen (TYLENOL) 500 MG tablet Take 2 tablets by mouth 3 times daily as needed for Pain 12/15/22  Yes BASSAM Epperson CNP   metFORMIN (GLUCOPHAGE) 1000 MG tablet TAKE ONE TABLET BY MOUTH TWICE A DAY WITH MEALS 5/25/22  Yes BASSAM Allison CNP   empagliflozin (JARDIANCE) 10 MG tablet Take 1 tablet by mouth daily  Patient taking differently: Take 2.5 tablets by mouth daily 5/23/22  Yes Leia Paris MD   amLODIPine (NORVASC) 10 MG tablet TAKE ONE TABLET BY MOUTH DAILY 1/4/22  Yes BASSAM Allison CNP   levothyroxine (SYNTHROID) 150 MCG tablet TAKE ONE TABLET BY MOUTH DAILY 12/12/21  Yes BASSAM Allison CNP   lisinopril-hydroCHLOROthiazide (PRINZIDE;ZESTORETIC) 20-12.5 MG per tablet Take two tablets by mouth daily 9/1/21  Yes Priti Mckinney MD   busPIRone (BUSPAR) 5 MG tablet TAKE ONE TABLET BY MOUTH TWICE A DAY AS NEEDED FOR ANXIETY 8/31/21  Yes Priti Mckinney MD   ACCU-CHEK LIBBY PLUS strip USE TO TEST ONCE DAILY 7/30/21  Yes Priti Mckinney MD   famotidine (PEPCID) 10 MG tablet Take 1 tablet by mouth 2 times daily   Yes Historical Provider, MD   Cetirizine HCl (ZYRTEC PO) Take by mouth 2 times daily 2 BID   Yes Historical Provider, MD   Insulin Pen Needle (PEN NEEDLES) 32G X 4 MM MISC USE ONCE DAILY FOR VICTOZA 4/16/18  Yes Danna Pascual MD   Blood Glucose Monitoring Suppl (ACURA BLOOD GLUCOSE METER) W/DEVICE KIT Provide pt with glucometer under forumlary -

## 2023-06-01 ENCOUNTER — HOSPITAL ENCOUNTER (OUTPATIENT)
Age: 78
Discharge: HOME | End: 2023-06-01
Payer: MEDICARE

## 2023-06-01 ENCOUNTER — OFFICE VISIT (OUTPATIENT)
Dept: CARDIOLOGY CLINIC | Age: 78
End: 2023-06-01
Payer: MEDICARE

## 2023-06-01 VITALS
HEIGHT: 66 IN | OXYGEN SATURATION: 95 % | DIASTOLIC BLOOD PRESSURE: 70 MMHG | BODY MASS INDEX: 36.07 KG/M2 | HEART RATE: 78 BPM | SYSTOLIC BLOOD PRESSURE: 138 MMHG | WEIGHT: 224.4 LBS

## 2023-06-01 DIAGNOSIS — I25.10 CORONARY ARTERY DISEASE, NON-OCCLUSIVE: ICD-10-CM

## 2023-06-01 DIAGNOSIS — I48.19 PERSISTENT ATRIAL FIBRILLATION (HCC): Primary | ICD-10-CM

## 2023-06-01 DIAGNOSIS — I10 ESSENTIAL HYPERTENSION: ICD-10-CM

## 2023-06-01 LAB
ANION GAP SERPL CALCULATED.3IONS-SCNC: 10 MMOL/L (ref 3–16)
BUN SERPL-MCNC: 16 MG/DL (ref 7–20)
CALCIUM SERPL-MCNC: 9.3 MG/DL (ref 8.3–10.6)
CHLORIDE SERPL-SCNC: 102 MMOL/L (ref 99–110)
CHOLEST SERPL-MCNC: 150 MG/DL (ref 0–199)
CO2 SERPL-SCNC: 27 MMOL/L (ref 21–32)
CREAT SERPL-MCNC: 0.5 MG/DL (ref 0.6–1.2)
GFR SERPLBLD CREATININE-BSD FMLA CKD-EPI: >60 ML/MIN/{1.73_M2}
GLUCOSE SERPL-MCNC: 137 MG/DL (ref 70–99)
HDLC SERPL-MCNC: 86 MG/DL (ref 40–60)
LDL CHOLESTEROL CALCULATED: 52 MG/DL
POTASSIUM SERPL-SCNC: 4.1 MMOL/L (ref 3.5–5.1)
SODIUM SERPL-SCNC: 139 MMOL/L (ref 136–145)
TRIGL SERPL-MCNC: 59 MG/DL (ref 0–150)
VLDLC SERPL CALC-MCNC: 12 MG/DL

## 2023-06-01 PROCEDURE — 80061 LIPID PANEL: CPT

## 2023-06-01 PROCEDURE — 3078F DIAST BP <80 MM HG: CPT

## 2023-06-01 PROCEDURE — 80048 BASIC METABOLIC PNL TOTAL CA: CPT

## 2023-06-01 PROCEDURE — 93000 ELECTROCARDIOGRAM COMPLETE: CPT

## 2023-06-01 PROCEDURE — 36415 COLL VENOUS BLD VENIPUNCTURE: CPT

## 2023-06-01 PROCEDURE — 3075F SYST BP GE 130 - 139MM HG: CPT

## 2023-06-01 PROCEDURE — 99214 OFFICE O/P EST MOD 30 MIN: CPT

## 2023-06-01 PROCEDURE — 1123F ACP DISCUSS/DSCN MKR DOCD: CPT

## 2023-06-01 NOTE — PATIENT INSTRUCTIONS
No changes today, continue your current medications    Continue to monitor your heart rate and blood pressure at home     Increase activity as tolerated     Follow up in 6 months or sooner if needed

## 2023-06-02 ENCOUNTER — TELEPHONE (OUTPATIENT)
Dept: CARDIOLOGY CLINIC | Age: 78
End: 2023-06-02

## 2023-06-02 NOTE — TELEPHONE ENCOUNTER
----- Message from Anjel Roberson MD sent at 6/2/2023 11:39 AM EDT -----  Please notify patient that their chol looks great

## 2023-08-04 ENCOUNTER — TELEPHONE (OUTPATIENT)
Dept: CARDIOLOGY CLINIC | Age: 78
End: 2023-08-04

## 2023-08-04 NOTE — TELEPHONE ENCOUNTER
Pt came into office to drop off patient assistance, paperwork has been placed in [employer] front mail bin in main suite. I was unable at the time to prepare samples, I was able to provide 30 day free trial card. If the trail does not work for pt can we please provide samples to last until application has been submitted & approved? Once paperwork is completed please contact pt at [phone] for .

## 2023-08-04 NOTE — TELEPHONE ENCOUNTER
Pt is going to drop off eliquis pt assistance  paperwork 8/4. Pt wants to know if we have any samples of eliquis 5mg. Pt has enough to last 7 days.      Last ov 03/27/23 asif

## 2023-08-07 ENCOUNTER — TELEPHONE (OUTPATIENT)
Dept: CARDIOLOGY CLINIC | Age: 78
End: 2023-08-07

## 2023-09-27 ENCOUNTER — HOSPITAL ENCOUNTER (OUTPATIENT)
Age: 78
Discharge: HOME | End: 2023-09-27
Payer: MEDICARE

## 2023-09-27 ENCOUNTER — HOSPITAL ENCOUNTER (OUTPATIENT)
Dept: GENERAL RADIOLOGY | Age: 78
Discharge: HOME | End: 2023-09-27
Payer: MEDICARE

## 2023-09-27 DIAGNOSIS — R05.3 PERSISTENT COUGH: ICD-10-CM

## 2023-09-27 PROCEDURE — 71046 X-RAY EXAM CHEST 2 VIEWS: CPT

## 2023-10-02 NOTE — PROGRESS NOTES
[facility]  Office Visit    Parul Held  0[birth year]    October 3, 2023    CC:   Chief Complaint   Patient presents with    Follow-up    Atrial Fibrillation    Hypertension    Hyperlipidemia    Coronary Artery Disease    Shortness of Breath     HPI:  The patient is 69 y.o. female with a past medical history notable for nonobstructive CAD (cath 3/4/22), RBBB, AV block, HTN, atrial fibrillation, HLP, morbid obesity, gastric bypass surgery,  and type II diabetes mellitus here for follow up d/t recent addition of diuretic per her PCP. She was last seen in March 2023 by Dr. Manny Lock and followed by EP in June 2023. No medications were changed at her last office visit in June 20223. Patient first evaluated 2/23/2022 and ECG demonstrated AF with HR of 68 bpm at that time. She was started on Eliquis. She underwent a cardiac cath on 3/4/2022 that demonstrated mild-moderate, nonobstructive CAD. Patient wore a cardiac event monitor from 2/23/2022 to 2/25/2022 which demonstrated persistent AF with an average HR of 73 (). The duration of the AF is unknown. Patient underwent successful CV on 7/28/2022. She was noted to have recurrence of atrial fibrillation in follow-up. Persistent by repeat monitor. Rate control strategy elected it appears per EP. Recently with increasing SOB and cough. Was recently OOT and went to urgent Care and placed on antibiotic. Seen by PCP upon return and  continued on her antibiotic and placed on diuretic. CXR on 9/27/23 showed CHF. She has been sleeping in a recliner for years. + PND. Last night had difficulty sleeping d/t cough and SOB. +increased wheezing. Monitoring sodium more closely since placed on diuretic. Has been on antibiotics x2 without relief. Diuretics x 3 without improvement (Lasix, HCTZ, aldactone). Her cough and SOB continue to worsen. + increased dizziness. + wet cough. Denies  palpitations, syncope, edema , weight change or claudication.   Initial

## 2023-10-03 ENCOUNTER — HOSPITAL ENCOUNTER (INPATIENT)
Age: 78
LOS: 5 days | Discharge: HOME | DRG: 291 | End: 2023-10-08
Attending: EMERGENCY MEDICINE | Admitting: INTERNAL MEDICINE
Payer: MEDICARE

## 2023-10-03 ENCOUNTER — APPOINTMENT (OUTPATIENT)
Dept: GENERAL RADIOLOGY | Age: 78
DRG: 291 | End: 2023-10-03
Payer: MEDICARE

## 2023-10-03 ENCOUNTER — OFFICE VISIT (OUTPATIENT)
Dept: CARDIOLOGY CLINIC | Age: 78
End: 2023-10-03
Payer: MEDICARE

## 2023-10-03 VITALS
SYSTOLIC BLOOD PRESSURE: 134 MMHG | BODY MASS INDEX: 36.32 KG/M2 | DIASTOLIC BLOOD PRESSURE: 60 MMHG | HEIGHT: 66 IN | OXYGEN SATURATION: 91 % | WEIGHT: 226 LBS | HEART RATE: 88 BPM

## 2023-10-03 DIAGNOSIS — R79.89 ELEVATED TROPONIN: ICD-10-CM

## 2023-10-03 DIAGNOSIS — E78.2 MIXED HYPERLIPIDEMIA: ICD-10-CM

## 2023-10-03 DIAGNOSIS — I25.10 CORONARY ARTERY DISEASE, NON-OCCLUSIVE: ICD-10-CM

## 2023-10-03 DIAGNOSIS — R06.09 EXERTIONAL DYSPNEA: Primary | ICD-10-CM

## 2023-10-03 DIAGNOSIS — E11.9 TYPE 2 DIABETES MELLITUS WITHOUT COMPLICATION, WITHOUT LONG-TERM CURRENT USE OF INSULIN (HCC): ICD-10-CM

## 2023-10-03 DIAGNOSIS — I48.19 PERSISTENT ATRIAL FIBRILLATION (HCC): ICD-10-CM

## 2023-10-03 DIAGNOSIS — I10 ESSENTIAL HYPERTENSION: ICD-10-CM

## 2023-10-03 DIAGNOSIS — I50.31 ACUTE HEART FAILURE WITH PRESERVED EJECTION FRACTION (HFPEF) (HCC): Primary | ICD-10-CM

## 2023-10-03 PROBLEM — I50.43 CHF (CONGESTIVE HEART FAILURE), NYHA CLASS I, ACUTE ON CHRONIC, COMBINED (HCC): Status: ACTIVE | Noted: 2023-10-03

## 2023-10-03 LAB
ALBUMIN SERPL-MCNC: 3.4 G/DL (ref 3.4–5)
ALBUMIN/GLOB SERPL: 0.9 {RATIO} (ref 1.1–2.2)
ALP SERPL-CCNC: 113 U/L (ref 40–129)
ALT SERPL-CCNC: 8 U/L (ref 10–40)
ANION GAP SERPL CALCULATED.3IONS-SCNC: 13 MMOL/L (ref 3–16)
AST SERPL-CCNC: 15 U/L (ref 15–37)
BASOPHILS # BLD: 0.1 K/UL (ref 0–0.2)
BASOPHILS NFR BLD: 0.9 %
BILIRUB SERPL-MCNC: 0.5 MG/DL (ref 0–1)
BUN SERPL-MCNC: 18 MG/DL (ref 7–20)
CALCIUM SERPL-MCNC: 9.2 MG/DL (ref 8.3–10.6)
CHLORIDE SERPL-SCNC: 100 MMOL/L (ref 99–110)
CO2 SERPL-SCNC: 23 MMOL/L (ref 21–32)
CREAT SERPL-MCNC: 0.6 MG/DL (ref 0.6–1.2)
DEPRECATED RDW RBC AUTO: 17.6 % (ref 12.4–15.4)
EKG ATRIAL RATE: 81 BPM
EKG DIAGNOSIS: NORMAL
EKG Q-T INTERVAL: 448 MS
EKG QRS DURATION: 134 MS
EKG QTC CALCULATION (BAZETT): 500 MS
EKG R AXIS: -46 DEGREES
EKG T AXIS: 3 DEGREES
EKG VENTRICULAR RATE: 75 BPM
EOSINOPHIL # BLD: 0.1 K/UL (ref 0–0.6)
EOSINOPHIL NFR BLD: 1.6 %
GFR SERPLBLD CREATININE-BSD FMLA CKD-EPI: >60 ML/MIN/{1.73_M2}
GLUCOSE BLD-MCNC: 151 MG/DL (ref 70–99)
GLUCOSE BLD-MCNC: 208 MG/DL (ref 70–99)
GLUCOSE SERPL-MCNC: 165 MG/DL (ref 70–99)
HCT VFR BLD AUTO: 40 % (ref 36–48)
HGB BLD-MCNC: 12.4 G/DL (ref 12–16)
LYMPHOCYTES # BLD: 1.1 K/UL (ref 1–5.1)
LYMPHOCYTES NFR BLD: 14.1 %
MAGNESIUM SERPL-MCNC: 2 MG/DL (ref 1.8–2.4)
MCH RBC QN AUTO: 24.6 PG (ref 26–34)
MCHC RBC AUTO-ENTMCNC: 31.1 G/DL (ref 31–36)
MCV RBC AUTO: 79.3 FL (ref 80–100)
MONOCYTES # BLD: 0.5 K/UL (ref 0–1.3)
MONOCYTES NFR BLD: 6.2 %
NEUTROPHILS # BLD: 6.1 K/UL (ref 1.7–7.7)
NEUTROPHILS NFR BLD: 77.2 %
NT-PROBNP SERPL-MCNC: 663 PG/ML (ref 0–449)
PERFORMED ON: ABNORMAL
PERFORMED ON: ABNORMAL
PLATELET # BLD AUTO: 272 K/UL (ref 135–450)
PMV BLD AUTO: 9.2 FL (ref 5–10.5)
POTASSIUM SERPL-SCNC: 3.7 MMOL/L (ref 3.5–5.1)
PROCALCITONIN SERPL IA-MCNC: 0.02 NG/ML (ref 0–0.15)
PROT SERPL-MCNC: 7.4 G/DL (ref 6.4–8.2)
RBC # BLD AUTO: 5.05 M/UL (ref 4–5.2)
SODIUM SERPL-SCNC: 136 MMOL/L (ref 136–145)
TROPONIN, HIGH SENSITIVITY: 19 NG/L (ref 0–14)
TROPONIN, HIGH SENSITIVITY: 21 NG/L (ref 0–14)
TSH SERPL DL<=0.005 MIU/L-ACNC: 1.2 UIU/ML (ref 0.27–4.2)
WBC # BLD AUTO: 7.9 K/UL (ref 4–11)

## 2023-10-03 PROCEDURE — 93005 ELECTROCARDIOGRAM TRACING: CPT | Performed by: PHYSICIAN ASSISTANT

## 2023-10-03 PROCEDURE — 85025 COMPLETE CBC W/AUTO DIFF WBC: CPT

## 2023-10-03 PROCEDURE — 94761 N-INVAS EAR/PLS OXIMETRY MLT: CPT

## 2023-10-03 PROCEDURE — 6370000000 HC RX 637 (ALT 250 FOR IP): Performed by: PHYSICIAN ASSISTANT

## 2023-10-03 PROCEDURE — 6360000002 HC RX W HCPCS: Performed by: PHYSICIAN ASSISTANT

## 2023-10-03 PROCEDURE — 96374 THER/PROPH/DIAG INJ IV PUSH: CPT

## 2023-10-03 PROCEDURE — 2700000000 HC OXYGEN THERAPY PER DAY

## 2023-10-03 PROCEDURE — 83735 ASSAY OF MAGNESIUM: CPT

## 2023-10-03 PROCEDURE — 3075F SYST BP GE 130 - 139MM HG: CPT | Performed by: NURSE PRACTITIONER

## 2023-10-03 PROCEDURE — 84443 ASSAY THYROID STIM HORMONE: CPT

## 2023-10-03 PROCEDURE — 99285 EMERGENCY DEPT VISIT HI MDM: CPT

## 2023-10-03 PROCEDURE — 6370000000 HC RX 637 (ALT 250 FOR IP): Performed by: NURSE PRACTITIONER

## 2023-10-03 PROCEDURE — 3078F DIAST BP <80 MM HG: CPT | Performed by: NURSE PRACTITIONER

## 2023-10-03 PROCEDURE — 83880 ASSAY OF NATRIURETIC PEPTIDE: CPT

## 2023-10-03 PROCEDURE — 80053 COMPREHEN METABOLIC PANEL: CPT

## 2023-10-03 PROCEDURE — 71045 X-RAY EXAM CHEST 1 VIEW: CPT

## 2023-10-03 PROCEDURE — 83036 HEMOGLOBIN GLYCOSYLATED A1C: CPT

## 2023-10-03 PROCEDURE — 6360000002 HC RX W HCPCS: Performed by: NURSE PRACTITIONER

## 2023-10-03 PROCEDURE — 99215 OFFICE O/P EST HI 40 MIN: CPT | Performed by: NURSE PRACTITIONER

## 2023-10-03 PROCEDURE — 84145 PROCALCITONIN (PCT): CPT

## 2023-10-03 PROCEDURE — 93010 ELECTROCARDIOGRAM REPORT: CPT | Performed by: INTERNAL MEDICINE

## 2023-10-03 PROCEDURE — 1123F ACP DISCUSS/DSCN MKR DOCD: CPT | Performed by: NURSE PRACTITIONER

## 2023-10-03 PROCEDURE — 2580000003 HC RX 258: Performed by: NURSE PRACTITIONER

## 2023-10-03 PROCEDURE — 1200000000 HC SEMI PRIVATE

## 2023-10-03 PROCEDURE — 84484 ASSAY OF TROPONIN QUANT: CPT

## 2023-10-03 RX ORDER — SODIUM CHLORIDE 9 MG/ML
INJECTION, SOLUTION INTRAVENOUS PRN
Status: DISCONTINUED | OUTPATIENT
Start: 2023-10-03 | End: 2023-10-08 | Stop reason: HOSPADM

## 2023-10-03 RX ORDER — FUROSEMIDE 10 MG/ML
40 INJECTION INTRAMUSCULAR; INTRAVENOUS 2 TIMES DAILY
Status: DISCONTINUED | OUTPATIENT
Start: 2023-10-03 | End: 2023-10-06

## 2023-10-03 RX ORDER — POTASSIUM CHLORIDE 20 MEQ/1
40 TABLET, EXTENDED RELEASE ORAL PRN
Status: DISCONTINUED | OUTPATIENT
Start: 2023-10-03 | End: 2023-10-05

## 2023-10-03 RX ORDER — AMLODIPINE BESYLATE 5 MG/1
10 TABLET ORAL DAILY
Status: DISCONTINUED | OUTPATIENT
Start: 2023-10-04 | End: 2023-10-08 | Stop reason: HOSPADM

## 2023-10-03 RX ORDER — SODIUM CHLORIDE 0.9 % (FLUSH) 0.9 %
5-40 SYRINGE (ML) INJECTION PRN
Status: DISCONTINUED | OUTPATIENT
Start: 2023-10-03 | End: 2023-10-08 | Stop reason: HOSPADM

## 2023-10-03 RX ORDER — DEXTROSE MONOHYDRATE 100 MG/ML
INJECTION, SOLUTION INTRAVENOUS CONTINUOUS PRN
Status: DISCONTINUED | OUTPATIENT
Start: 2023-10-03 | End: 2023-10-08 | Stop reason: HOSPADM

## 2023-10-03 RX ORDER — ONDANSETRON 2 MG/ML
4 INJECTION INTRAMUSCULAR; INTRAVENOUS EVERY 6 HOURS PRN
Status: DISCONTINUED | OUTPATIENT
Start: 2023-10-03 | End: 2023-10-08 | Stop reason: HOSPADM

## 2023-10-03 RX ORDER — LEVOTHYROXINE SODIUM 0.15 MG/1
150 TABLET ORAL
Status: DISCONTINUED | OUTPATIENT
Start: 2023-10-04 | End: 2023-10-08 | Stop reason: HOSPADM

## 2023-10-03 RX ORDER — POLYETHYLENE GLYCOL 3350 17 G/17G
17 POWDER, FOR SOLUTION ORAL DAILY PRN
Status: DISCONTINUED | OUTPATIENT
Start: 2023-10-03 | End: 2023-10-08 | Stop reason: HOSPADM

## 2023-10-03 RX ORDER — ACETAMINOPHEN 650 MG/1
650 SUPPOSITORY RECTAL EVERY 6 HOURS PRN
Status: DISCONTINUED | OUTPATIENT
Start: 2023-10-03 | End: 2023-10-08 | Stop reason: HOSPADM

## 2023-10-03 RX ORDER — FAMOTIDINE 20 MG/1
10 TABLET, FILM COATED ORAL 2 TIMES DAILY
Status: DISCONTINUED | OUTPATIENT
Start: 2023-10-03 | End: 2023-10-08 | Stop reason: HOSPADM

## 2023-10-03 RX ORDER — SODIUM CHLORIDE 0.9 % (FLUSH) 0.9 %
5-40 SYRINGE (ML) INJECTION EVERY 12 HOURS SCHEDULED
Status: DISCONTINUED | OUTPATIENT
Start: 2023-10-03 | End: 2023-10-08 | Stop reason: HOSPADM

## 2023-10-03 RX ORDER — HYDROCHLOROTHIAZIDE 25 MG/1
12.5 TABLET ORAL DAILY
Status: DISCONTINUED | OUTPATIENT
Start: 2023-10-04 | End: 2023-10-05

## 2023-10-03 RX ORDER — ACETAMINOPHEN 325 MG/1
650 TABLET ORAL EVERY 6 HOURS PRN
Status: DISCONTINUED | OUTPATIENT
Start: 2023-10-03 | End: 2023-10-08 | Stop reason: HOSPADM

## 2023-10-03 RX ORDER — ASPIRIN 81 MG/1
243 TABLET, CHEWABLE ORAL ONCE
Status: COMPLETED | OUTPATIENT
Start: 2023-10-03 | End: 2023-10-03

## 2023-10-03 RX ORDER — FUROSEMIDE 20 MG/1
TABLET ORAL
Status: ON HOLD | COMMUNITY
Start: 2023-09-29 | End: 2023-10-08 | Stop reason: HOSPADM

## 2023-10-03 RX ORDER — IPRATROPIUM BROMIDE AND ALBUTEROL SULFATE 2.5; .5 MG/3ML; MG/3ML
1 SOLUTION RESPIRATORY (INHALATION) ONCE
Status: DISCONTINUED | OUTPATIENT
Start: 2023-10-03 | End: 2023-10-03

## 2023-10-03 RX ORDER — LISINOPRIL AND HYDROCHLOROTHIAZIDE 20; 12.5 MG/1; MG/1
1 TABLET ORAL DAILY
Status: DISCONTINUED | OUTPATIENT
Start: 2023-10-03 | End: 2023-10-03 | Stop reason: CLARIF

## 2023-10-03 RX ORDER — ONDANSETRON 4 MG/1
4 TABLET, ORALLY DISINTEGRATING ORAL EVERY 8 HOURS PRN
Status: DISCONTINUED | OUTPATIENT
Start: 2023-10-03 | End: 2023-10-08 | Stop reason: HOSPADM

## 2023-10-03 RX ORDER — GLUCAGON 1 MG/ML
1 KIT INJECTION PRN
Status: DISCONTINUED | OUTPATIENT
Start: 2023-10-03 | End: 2023-10-08 | Stop reason: HOSPADM

## 2023-10-03 RX ORDER — FLUOXETINE HYDROCHLORIDE 20 MG/1
40 CAPSULE ORAL DAILY
Status: DISCONTINUED | OUTPATIENT
Start: 2023-10-04 | End: 2023-10-08 | Stop reason: HOSPADM

## 2023-10-03 RX ORDER — ASPIRIN 325 MG
325 TABLET ORAL ONCE
Status: DISCONTINUED | OUTPATIENT
Start: 2023-10-03 | End: 2023-10-03

## 2023-10-03 RX ORDER — ASPIRIN 81 MG/1
81 TABLET ORAL DAILY
Status: DISCONTINUED | OUTPATIENT
Start: 2023-10-04 | End: 2023-10-08 | Stop reason: HOSPADM

## 2023-10-03 RX ORDER — POTASSIUM CHLORIDE 7.45 MG/ML
10 INJECTION INTRAVENOUS PRN
Status: DISCONTINUED | OUTPATIENT
Start: 2023-10-03 | End: 2023-10-05

## 2023-10-03 RX ORDER — LISINOPRIL 20 MG/1
20 TABLET ORAL DAILY
Status: DISCONTINUED | OUTPATIENT
Start: 2023-10-04 | End: 2023-10-05

## 2023-10-03 RX ORDER — ATORVASTATIN CALCIUM 10 MG/1
20 TABLET, FILM COATED ORAL NIGHTLY
Status: DISCONTINUED | OUTPATIENT
Start: 2023-10-04 | End: 2023-10-08 | Stop reason: HOSPADM

## 2023-10-03 RX ORDER — FUROSEMIDE 10 MG/ML
40 INJECTION INTRAMUSCULAR; INTRAVENOUS ONCE
Status: COMPLETED | OUTPATIENT
Start: 2023-10-03 | End: 2023-10-03

## 2023-10-03 RX ORDER — MAGNESIUM SULFATE IN WATER 40 MG/ML
2000 INJECTION, SOLUTION INTRAVENOUS PRN
Status: DISCONTINUED | OUTPATIENT
Start: 2023-10-03 | End: 2023-10-08 | Stop reason: HOSPADM

## 2023-10-03 RX ORDER — BUSPIRONE HYDROCHLORIDE 5 MG/1
5 TABLET ORAL 2 TIMES DAILY PRN
Status: DISCONTINUED | OUTPATIENT
Start: 2023-10-03 | End: 2023-10-08 | Stop reason: HOSPADM

## 2023-10-03 RX ORDER — INSULIN LISPRO 100 [IU]/ML
0-4 INJECTION, SOLUTION INTRAVENOUS; SUBCUTANEOUS NIGHTLY
Status: DISCONTINUED | OUTPATIENT
Start: 2023-10-03 | End: 2023-10-08 | Stop reason: HOSPADM

## 2023-10-03 RX ORDER — INSULIN LISPRO 100 [IU]/ML
0-4 INJECTION, SOLUTION INTRAVENOUS; SUBCUTANEOUS
Status: DISCONTINUED | OUTPATIENT
Start: 2023-10-03 | End: 2023-10-08 | Stop reason: HOSPADM

## 2023-10-03 RX ADMIN — SODIUM CHLORIDE, PRESERVATIVE FREE 10 ML: 5 INJECTION INTRAVENOUS at 21:24

## 2023-10-03 RX ADMIN — APIXABAN 5 MG: 5 TABLET, FILM COATED ORAL at 21:23

## 2023-10-03 RX ADMIN — FUROSEMIDE 40 MG: 10 INJECTION, SOLUTION INTRAMUSCULAR; INTRAVENOUS at 17:32

## 2023-10-03 RX ADMIN — INSULIN LISPRO 1 UNITS: 100 INJECTION, SOLUTION INTRAVENOUS; SUBCUTANEOUS at 17:32

## 2023-10-03 RX ADMIN — FUROSEMIDE 40 MG: 10 INJECTION, SOLUTION INTRAMUSCULAR; INTRAVENOUS at 10:43

## 2023-10-03 RX ADMIN — FAMOTIDINE 10 MG: 20 TABLET ORAL at 21:23

## 2023-10-03 RX ADMIN — ASPIRIN 243 MG: 81 TABLET, CHEWABLE ORAL at 12:14

## 2023-10-03 ASSESSMENT — PAIN SCALES - GENERAL
PAINLEVEL_OUTOF10: 0

## 2023-10-03 ASSESSMENT — HEART SCORE: ECG: 1

## 2023-10-03 ASSESSMENT — PAIN - FUNCTIONAL ASSESSMENT: PAIN_FUNCTIONAL_ASSESSMENT: 0-10

## 2023-10-03 NOTE — ED PROVIDER NOTES
I independently performed a history and physical on Joseph Crenshaw. All diagnostic, treatment, and disposition decisions were made by myself in conjunction with the advanced practice provider. I personally saw the patient and performed a substantive portion of the visit including all aspects of the medical decision making. For further details of Anyi Delgado [facility] emergency department encounter, please see FABIENNE Islas's documentation. Patient is a 27-year-old female who started getting upper respiratory symptoms starting middle of September at which point she did take amoxicillin and subsequently doxycycline but is still having persistent cough along with dyspnea with exertion. She denies any actual chest pain over the last few weeks. No abdominal pain or vomiting or diarrhea. She denies any new neurological concerns. She denies any weight gain or abnormal leg swelling. She denies any history of heart failure or COPD. No fever. Due to concern for dyspnea with exertion, she came to the emergency department for further evaluation after seeing cardiology. Physical:   Gen: No acute distress. Alert and answering questions appropriately  Psych: Normal mood and affect  HEENT: NCAT, MMM  Neck: supple  Cardiac: No calf swelling or tenderness bilaterally, irregularly irregular rhythm with normal rate  Lungs: Normal effort, bilateral breath sounds present although rhonchi and Rales noted in bilateral bases and scattered wheezing noted in the upper lung fields  Abdomen: soft and nontender with no R/D/G  Neuro: Moving all extremities equally, GCS equals 15    The Ekg interpreted by me shows  atrial fibrillation with a rate of 75 with occasionally PVC noted   Axis is   Left axis deviation  QTc is  within an acceptable range  Intervals and Durations are unremarkable.       ST Segments: no acute change and nonspecific changes  No significant change from prior EKG dated - 6/1/23  No STEMI, right bundle branch

## 2023-10-03 NOTE — ED PROVIDER NOTES
[address] [place] [address]  ED  EMERGENCY DEPARTMENT ENCOUNTER        Pt Name: Robert Keene  MRN: [id number]  [facility] [birth year]  Date of evaluation: 10/3/2023  Provider: Charlene Crum PA-C  PCP: BASSAM Lema - NP  ED Attending: Marc Hinkle MD       I have seen and evaluated this patient with my supervising physician Marc Hinkle MD.    CHIEF COMPLAINT:     Chief Complaint   Patient presents with    Shortness of Breath     Started awhile ago but progressively getting worse       HISTORY OF PRESENT ILLNESS:      History provided by the patient. No limitations. Robert Keene is a 69 y.o. female who arrives to the ED by private vehicle. Patient sent here by cardiology, Dr. Leydi Langley. The patient reports exertional shortness of breath that has been getting worse. She was seen by primary care NP last week. She had been treated with antibiotics for cough but was not feeling better. Outpatient chest x-ray suggested mild CHF and the patient was started on Lasix 20 mg daily. She has been taking that but continues to experience exertional dyspnea. Patient denies chest pain. She denies prior history of CHF. Again symptoms exacerbated with activity and alleviated at rest.    Nursing Notes were reviewed     REVIEW OF SYSTEMS:     Review of Systems  Positives and pertinent negatives as per HPI.       PAST MEDICAL HISTORY:     Past Medical History:   Diagnosis Date    Coronary artery disease, non-occlusive 3/27/2023    DJD (degenerative joint disease)     Hyperlipidemia     Hypertension     Hypothyroidism     Morbid obesity due to excess calories ([address]) 2/11/2016    Type II or unspecified type diabetes mellitus without mention of complication, not stated as uncontrolled        SURGICAL HISTORY:      Past Surgical History:   Procedure Laterality Date    CHOLECYSTECTOMY      COLONOSCOPY      DILATION AND CURETTAGE OF UTERUS      GASTRIC BYPASS SURGERY      HERNIA REPAIR      HYSTERECTOMY, TOTAL

## 2023-10-03 NOTE — CARE COORDINATION
Case Management Assessment  Initial Evaluation    Date/Time of Evaluation: 10/3/2023 12:27 PM  Assessment Completed by: Millicent Nageotte, MSW    If patient is discharged prior to next notation, then this note serves as note for discharge by case management. Patient Name: Suzanne Wick                   YOB: 1945  Diagnosis: CHF (congestive heart failure), NYHA class I, acute on chronic, combined ([address]) [I50.43]                   Date / Time: 10/3/2023  9:40 AM    Patient Admission Status: Inpatient   Readmission Risk (Low < 19, Mod (19-27), High > 27): No data recorded  Current PCP: BASSAM Guardado NP  PCP verified by CM? Yes    Chart Reviewed: Yes      History Provided by: Patient  Patient Orientation: Alert and Oriented    Patient Cognition: Alert    Hospitalization in the last 30 days (Readmission):  No    If yes, Readmission Assessment in  Navigator will be completed. Advance Directives:      Code Status: Prior   Patient's Primary Decision Maker is: Legal Next of Kin      Discharge Planning:    Patient lives with: Alone Type of Home: Apartment  Primary Care Giver: Self  Patient Support Systems include: Spouse/Significant Other   Current Financial resources: None  Current community resources: None  Current services prior to admission: Durable Medical Equipment            Current DME: Cane            Type of Home Care services:  Nursing Services    ADLS  Prior functional level: Independent in ADLs/IADLs  Current functional level: Independent in ADLs/IADLs    PT AM-PAC:   /24  OT AM-PAC:   /24    Family can provide assistance at DC: Yes  Would you like Case Management to discuss the discharge plan with any other family members/significant others, and if so, who?     Plans to Return to Present Housing: Yes  Other Identified Issues/Barriers to RETURNING to current housing: none noted  Potential Assistance needed at discharge: Home Care            Potential DME:    Patient expects to

## 2023-10-03 NOTE — H&P
Hospital Medicine History & Physical      Date of Admission: 10/3/2023    Date of Service:  Pt seen/examined on 10/3/2023    [x]Admitted to Inpatient with expected LOS greater than two midnights due to medical therapy. []Placed in Observation status. Chief Admission Complaint:    Chief Complaint   Patient presents with    Shortness of Breath     Started awhile ago but progressively getting worse       Presenting Admission History:      69 y.o. female who presented to [facility] from her Cardiologists appointment with Dr. Verna Hung. PMHx significant for AFIB, DMII, HTN, CAD, HLD, and HFpEF. She presented to her Cardiology appointment with complaints of exertional SOB that has progressively getting worse over the past week or so. She has been fighting a URI and has completed two course of antibiotic therapy over the past three weeks but still has a persistent cough. Outpatient Cxr on 9/27 revealed cardiopulmonary congestion and without infiltrates or consolidation. She was newly diagnosed with CHF within the past year. IN the ED she is not hypoxic, sats are 92-94% on RA. CXR revealed evealed no change from prior examination suggestion of mild CHF. Hypoventilatory changes in the lung bases. She was hemodynamically stable and without fever.   Troponin 19 ---> 21  EKG AFIB with rate of 81    Assessment/Plan:      Current Principal Problem:  CHF (congestive heart failure), NYHA class I, acute on chronic, combined (HCC)    Acute exacerbation of congestive heart failure with preserved ejection fraction (HFpEF) ([address])  She has had increasing SOB with an abnormal CXR on 9/27/23  (findings suggest congestive heart failure), and was hypoxic on arrival to her Cardiologist office today. She has been on Lasix, HCTZ, and aldactone at home but with worsening symptoms, she was referred to the ED. CXR in ED revealed no change from prior examination suggestion of mild CHF.   Hypoventilatory changes in the lung

## 2023-10-04 PROBLEM — I50.33 ACUTE ON CHRONIC DIASTOLIC (CONGESTIVE) HEART FAILURE (HCC): Status: ACTIVE | Noted: 2023-10-03

## 2023-10-04 PROBLEM — R79.89 ELEVATED TROPONIN: Status: ACTIVE | Noted: 2023-10-04

## 2023-10-04 LAB
ANION GAP SERPL CALCULATED.3IONS-SCNC: 13 MMOL/L (ref 3–16)
BUN SERPL-MCNC: 17 MG/DL (ref 7–20)
CALCIUM SERPL-MCNC: 9.1 MG/DL (ref 8.3–10.6)
CHLORIDE SERPL-SCNC: 103 MMOL/L (ref 99–110)
CHOLEST SERPL-MCNC: 110 MG/DL (ref 0–199)
CO2 SERPL-SCNC: 25 MMOL/L (ref 21–32)
CREAT SERPL-MCNC: 0.6 MG/DL (ref 0.6–1.2)
EST. AVERAGE GLUCOSE BLD GHB EST-MCNC: 182.9 MG/DL
GFR SERPLBLD CREATININE-BSD FMLA CKD-EPI: >60 ML/MIN/{1.73_M2}
GLUCOSE BLD-MCNC: 138 MG/DL (ref 70–99)
GLUCOSE BLD-MCNC: 159 MG/DL (ref 70–99)
GLUCOSE BLD-MCNC: 199 MG/DL (ref 70–99)
GLUCOSE BLD-MCNC: 287 MG/DL (ref 70–99)
GLUCOSE SERPL-MCNC: 145 MG/DL (ref 70–99)
HBA1C MFR BLD: 8 %
HDLC SERPL-MCNC: 62 MG/DL (ref 40–60)
LDLC SERPL CALC-MCNC: 33 MG/DL
MAGNESIUM SERPL-MCNC: 2 MG/DL (ref 1.8–2.4)
NT-PROBNP SERPL-MCNC: 438 PG/ML (ref 0–449)
PERFORMED ON: ABNORMAL
POTASSIUM SERPL-SCNC: 3.5 MMOL/L (ref 3.5–5.1)
SODIUM SERPL-SCNC: 141 MMOL/L (ref 136–145)
TRIGL SERPL-MCNC: 74 MG/DL (ref 0–150)
VLDLC SERPL CALC-MCNC: 15 MG/DL

## 2023-10-04 PROCEDURE — 36415 COLL VENOUS BLD VENIPUNCTURE: CPT

## 2023-10-04 PROCEDURE — 97530 THERAPEUTIC ACTIVITIES: CPT

## 2023-10-04 PROCEDURE — 6370000000 HC RX 637 (ALT 250 FOR IP): Performed by: STUDENT IN AN ORGANIZED HEALTH CARE EDUCATION/TRAINING PROGRAM

## 2023-10-04 PROCEDURE — 2700000000 HC OXYGEN THERAPY PER DAY

## 2023-10-04 PROCEDURE — 80048 BASIC METABOLIC PNL TOTAL CA: CPT

## 2023-10-04 PROCEDURE — 2580000003 HC RX 258: Performed by: NURSE PRACTITIONER

## 2023-10-04 PROCEDURE — 97166 OT EVAL MOD COMPLEX 45 MIN: CPT

## 2023-10-04 PROCEDURE — 97161 PT EVAL LOW COMPLEX 20 MIN: CPT

## 2023-10-04 PROCEDURE — 1200000000 HC SEMI PRIVATE

## 2023-10-04 PROCEDURE — 80061 LIPID PANEL: CPT

## 2023-10-04 PROCEDURE — 97110 THERAPEUTIC EXERCISES: CPT

## 2023-10-04 PROCEDURE — 94761 N-INVAS EAR/PLS OXIMETRY MLT: CPT

## 2023-10-04 PROCEDURE — 83735 ASSAY OF MAGNESIUM: CPT

## 2023-10-04 PROCEDURE — 94669 MECHANICAL CHEST WALL OSCILL: CPT

## 2023-10-04 PROCEDURE — 6370000000 HC RX 637 (ALT 250 FOR IP): Performed by: NURSE PRACTITIONER

## 2023-10-04 PROCEDURE — 6360000002 HC RX W HCPCS: Performed by: NURSE PRACTITIONER

## 2023-10-04 PROCEDURE — 94640 AIRWAY INHALATION TREATMENT: CPT

## 2023-10-04 PROCEDURE — 97535 SELF CARE MNGMENT TRAINING: CPT

## 2023-10-04 PROCEDURE — 83880 ASSAY OF NATRIURETIC PEPTIDE: CPT

## 2023-10-04 PROCEDURE — 99221 1ST HOSP IP/OBS SF/LOW 40: CPT | Performed by: STUDENT IN AN ORGANIZED HEALTH CARE EDUCATION/TRAINING PROGRAM

## 2023-10-04 RX ORDER — IPRATROPIUM BROMIDE AND ALBUTEROL SULFATE 2.5; .5 MG/3ML; MG/3ML
1 SOLUTION RESPIRATORY (INHALATION)
Status: COMPLETED | OUTPATIENT
Start: 2023-10-04 | End: 2023-10-04

## 2023-10-04 RX ADMIN — LISINOPRIL 20 MG: 20 TABLET ORAL at 10:32

## 2023-10-04 RX ADMIN — IPRATROPIUM BROMIDE AND ALBUTEROL SULFATE 1 DOSE: 2.5; .5 SOLUTION RESPIRATORY (INHALATION) at 15:50

## 2023-10-04 RX ADMIN — SODIUM CHLORIDE, PRESERVATIVE FREE 10 ML: 5 INJECTION INTRAVENOUS at 10:35

## 2023-10-04 RX ADMIN — FAMOTIDINE 10 MG: 20 TABLET ORAL at 10:32

## 2023-10-04 RX ADMIN — SODIUM CHLORIDE, PRESERVATIVE FREE 10 ML: 5 INJECTION INTRAVENOUS at 21:36

## 2023-10-04 RX ADMIN — FUROSEMIDE 40 MG: 10 INJECTION, SOLUTION INTRAMUSCULAR; INTRAVENOUS at 10:33

## 2023-10-04 RX ADMIN — EMPAGLIFLOZIN 10 MG: 10 TABLET, FILM COATED ORAL at 15:05

## 2023-10-04 RX ADMIN — AMLODIPINE BESYLATE 10 MG: 5 TABLET ORAL at 10:33

## 2023-10-04 RX ADMIN — IPRATROPIUM BROMIDE AND ALBUTEROL SULFATE 1 DOSE: 2.5; .5 SOLUTION RESPIRATORY (INHALATION) at 11:43

## 2023-10-04 RX ADMIN — APIXABAN 5 MG: 5 TABLET, FILM COATED ORAL at 10:33

## 2023-10-04 RX ADMIN — LEVOTHYROXINE SODIUM 150 MCG: 0.15 TABLET ORAL at 07:01

## 2023-10-04 RX ADMIN — ATORVASTATIN CALCIUM 20 MG: 10 TABLET, FILM COATED ORAL at 21:33

## 2023-10-04 RX ADMIN — FUROSEMIDE 40 MG: 10 INJECTION, SOLUTION INTRAMUSCULAR; INTRAVENOUS at 18:19

## 2023-10-04 RX ADMIN — APIXABAN 5 MG: 5 TABLET, FILM COATED ORAL at 21:33

## 2023-10-04 RX ADMIN — FAMOTIDINE 10 MG: 20 TABLET ORAL at 21:35

## 2023-10-04 RX ADMIN — FLUOXETINE 40 MG: 20 CAPSULE ORAL at 10:32

## 2023-10-04 RX ADMIN — HYDROCHLOROTHIAZIDE 12.5 MG: 25 TABLET ORAL at 10:33

## 2023-10-04 RX ADMIN — ASPIRIN 81 MG: 81 TABLET, COATED ORAL at 10:32

## 2023-10-04 NOTE — CARE COORDINATION
Chart reviewed day 1. Care provided by cards and IM. Pt is from home alone and is IPTA. Pt is on O2 at 1L and her baseline is RA. Pt on lasix IV. Pt is down 6 lbs since yesterday. Will continue to follow course for needs.  Ayla Ambrosio RN

## 2023-10-05 LAB
ANION GAP SERPL CALCULATED.3IONS-SCNC: 10 MMOL/L (ref 3–16)
BASOPHILS # BLD: 0.1 K/UL (ref 0–0.2)
BASOPHILS NFR BLD: 1.2 %
BUN SERPL-MCNC: 22 MG/DL (ref 7–20)
CALCIUM SERPL-MCNC: 9.2 MG/DL (ref 8.3–10.6)
CHLORIDE SERPL-SCNC: 98 MMOL/L (ref 99–110)
CO2 SERPL-SCNC: 28 MMOL/L (ref 21–32)
CREAT SERPL-MCNC: 0.7 MG/DL (ref 0.6–1.2)
DEPRECATED RDW RBC AUTO: 17.8 % (ref 12.4–15.4)
EOSINOPHIL # BLD: 0.1 K/UL (ref 0–0.6)
EOSINOPHIL NFR BLD: 1.8 %
GFR SERPLBLD CREATININE-BSD FMLA CKD-EPI: >60 ML/MIN/{1.73_M2}
GLUCOSE BLD-MCNC: 162 MG/DL (ref 70–99)
GLUCOSE BLD-MCNC: 189 MG/DL (ref 70–99)
GLUCOSE BLD-MCNC: 255 MG/DL (ref 70–99)
GLUCOSE BLD-MCNC: 261 MG/DL (ref 70–99)
GLUCOSE SERPL-MCNC: 170 MG/DL (ref 70–99)
HCT VFR BLD AUTO: 38.1 % (ref 36–48)
HGB BLD-MCNC: 12.3 G/DL (ref 12–16)
LYMPHOCYTES # BLD: 1.2 K/UL (ref 1–5.1)
LYMPHOCYTES NFR BLD: 15.3 %
MAGNESIUM SERPL-MCNC: 2.1 MG/DL (ref 1.8–2.4)
MCH RBC QN AUTO: 26.7 PG (ref 26–34)
MCHC RBC AUTO-ENTMCNC: 32.3 G/DL (ref 31–36)
MCV RBC AUTO: 82.5 FL (ref 80–100)
MONOCYTES # BLD: 0.8 K/UL (ref 0–1.3)
MONOCYTES NFR BLD: 10.1 %
NEUTROPHILS # BLD: 5.6 K/UL (ref 1.7–7.7)
NEUTROPHILS NFR BLD: 71.6 %
PERFORMED ON: ABNORMAL
PLATELET # BLD AUTO: 229 K/UL (ref 135–450)
PMV BLD AUTO: 8.8 FL (ref 5–10.5)
POTASSIUM SERPL-SCNC: 3.2 MMOL/L (ref 3.5–5.1)
RBC # BLD AUTO: 4.61 M/UL (ref 4–5.2)
SODIUM SERPL-SCNC: 136 MMOL/L (ref 136–145)
WBC # BLD AUTO: 7.8 K/UL (ref 4–11)

## 2023-10-05 PROCEDURE — 6360000002 HC RX W HCPCS: Performed by: NURSE PRACTITIONER

## 2023-10-05 PROCEDURE — 83735 ASSAY OF MAGNESIUM: CPT

## 2023-10-05 PROCEDURE — 94669 MECHANICAL CHEST WALL OSCILL: CPT

## 2023-10-05 PROCEDURE — 99232 SBSQ HOSP IP/OBS MODERATE 35: CPT | Performed by: NURSE PRACTITIONER

## 2023-10-05 PROCEDURE — 6370000000 HC RX 637 (ALT 250 FOR IP): Performed by: NURSE PRACTITIONER

## 2023-10-05 PROCEDURE — 93306 TTE W/DOPPLER COMPLETE: CPT

## 2023-10-05 PROCEDURE — 85025 COMPLETE CBC W/AUTO DIFF WBC: CPT

## 2023-10-05 PROCEDURE — 36415 COLL VENOUS BLD VENIPUNCTURE: CPT

## 2023-10-05 PROCEDURE — 2580000003 HC RX 258: Performed by: NURSE PRACTITIONER

## 2023-10-05 PROCEDURE — 6370000000 HC RX 637 (ALT 250 FOR IP): Performed by: STUDENT IN AN ORGANIZED HEALTH CARE EDUCATION/TRAINING PROGRAM

## 2023-10-05 PROCEDURE — 1200000000 HC SEMI PRIVATE

## 2023-10-05 PROCEDURE — 80048 BASIC METABOLIC PNL TOTAL CA: CPT

## 2023-10-05 RX ORDER — POTASSIUM CHLORIDE 20 MEQ/1
40 TABLET, EXTENDED RELEASE ORAL ONCE
Status: COMPLETED | OUTPATIENT
Start: 2023-10-05 | End: 2023-10-05

## 2023-10-05 RX ORDER — GUAIFENESIN 600 MG/1
600 TABLET, EXTENDED RELEASE ORAL 2 TIMES DAILY
Status: DISCONTINUED | OUTPATIENT
Start: 2023-10-05 | End: 2023-10-08 | Stop reason: HOSPADM

## 2023-10-05 RX ORDER — POTASSIUM CHLORIDE 20 MEQ/1
20 TABLET, EXTENDED RELEASE ORAL DAILY
Status: DISCONTINUED | OUTPATIENT
Start: 2023-10-06 | End: 2023-10-06

## 2023-10-05 RX ADMIN — HYDROCHLOROTHIAZIDE 12.5 MG: 25 TABLET ORAL at 09:42

## 2023-10-05 RX ADMIN — SODIUM CHLORIDE, PRESERVATIVE FREE 10 ML: 5 INJECTION INTRAVENOUS at 20:45

## 2023-10-05 RX ADMIN — FUROSEMIDE 40 MG: 10 INJECTION, SOLUTION INTRAMUSCULAR; INTRAVENOUS at 18:03

## 2023-10-05 RX ADMIN — POTASSIUM CHLORIDE 40 MEQ: 1500 TABLET, EXTENDED RELEASE ORAL at 12:06

## 2023-10-05 RX ADMIN — APIXABAN 5 MG: 5 TABLET, FILM COATED ORAL at 20:44

## 2023-10-05 RX ADMIN — FAMOTIDINE 10 MG: 20 TABLET ORAL at 20:45

## 2023-10-05 RX ADMIN — ATORVASTATIN CALCIUM 20 MG: 10 TABLET, FILM COATED ORAL at 20:44

## 2023-10-05 RX ADMIN — INSULIN LISPRO 2 UNITS: 100 INJECTION, SOLUTION INTRAVENOUS; SUBCUTANEOUS at 12:48

## 2023-10-05 RX ADMIN — FAMOTIDINE 10 MG: 20 TABLET ORAL at 09:43

## 2023-10-05 RX ADMIN — LEVOTHYROXINE SODIUM 150 MCG: 0.15 TABLET ORAL at 05:19

## 2023-10-05 RX ADMIN — FLUOXETINE 40 MG: 20 CAPSULE ORAL at 09:42

## 2023-10-05 RX ADMIN — GUAIFENESIN 600 MG: 600 TABLET ORAL at 20:45

## 2023-10-05 RX ADMIN — APIXABAN 5 MG: 5 TABLET, FILM COATED ORAL at 09:43

## 2023-10-05 RX ADMIN — LISINOPRIL 20 MG: 20 TABLET ORAL at 09:43

## 2023-10-05 RX ADMIN — SODIUM CHLORIDE, PRESERVATIVE FREE 10 ML: 5 INJECTION INTRAVENOUS at 09:43

## 2023-10-05 RX ADMIN — AMLODIPINE BESYLATE 10 MG: 5 TABLET ORAL at 09:42

## 2023-10-05 RX ADMIN — FUROSEMIDE 40 MG: 10 INJECTION, SOLUTION INTRAMUSCULAR; INTRAVENOUS at 09:40

## 2023-10-05 RX ADMIN — ASPIRIN 81 MG: 81 TABLET, COATED ORAL at 09:42

## 2023-10-05 RX ADMIN — EMPAGLIFLOZIN 10 MG: 10 TABLET, FILM COATED ORAL at 09:42

## 2023-10-05 RX ADMIN — GUAIFENESIN 600 MG: 600 TABLET ORAL at 12:06

## 2023-10-06 ENCOUNTER — APPOINTMENT (OUTPATIENT)
Dept: GENERAL RADIOLOGY | Age: 78
DRG: 291 | End: 2023-10-06
Payer: MEDICARE

## 2023-10-06 PROBLEM — R06.09 EXERTIONAL DYSPNEA: Status: ACTIVE | Noted: 2023-10-06

## 2023-10-06 LAB
ANION GAP SERPL CALCULATED.3IONS-SCNC: 11 MMOL/L (ref 3–16)
BASOPHILS # BLD: 0.1 K/UL (ref 0–0.2)
BASOPHILS NFR BLD: 1.1 %
BUN SERPL-MCNC: 28 MG/DL (ref 7–20)
CALCIUM SERPL-MCNC: 9.1 MG/DL (ref 8.3–10.6)
CHLORIDE SERPL-SCNC: 99 MMOL/L (ref 99–110)
CO2 SERPL-SCNC: 26 MMOL/L (ref 21–32)
CREAT SERPL-MCNC: 0.7 MG/DL (ref 0.6–1.2)
DEPRECATED RDW RBC AUTO: 17.7 % (ref 12.4–15.4)
EOSINOPHIL # BLD: 0.1 K/UL (ref 0–0.6)
EOSINOPHIL NFR BLD: 2 %
GFR SERPLBLD CREATININE-BSD FMLA CKD-EPI: >60 ML/MIN/{1.73_M2}
GLUCOSE BLD-MCNC: 157 MG/DL (ref 70–99)
GLUCOSE BLD-MCNC: 188 MG/DL (ref 70–99)
GLUCOSE BLD-MCNC: 249 MG/DL (ref 70–99)
GLUCOSE BLD-MCNC: 372 MG/DL (ref 70–99)
GLUCOSE SERPL-MCNC: 173 MG/DL (ref 70–99)
HCT VFR BLD AUTO: 40.6 % (ref 36–48)
HGB BLD-MCNC: 12.9 G/DL (ref 12–16)
LYMPHOCYTES # BLD: 1.1 K/UL (ref 1–5.1)
LYMPHOCYTES NFR BLD: 15.8 %
MAGNESIUM SERPL-MCNC: 2.2 MG/DL (ref 1.8–2.4)
MCH RBC QN AUTO: 25.1 PG (ref 26–34)
MCHC RBC AUTO-ENTMCNC: 31.9 G/DL (ref 31–36)
MCV RBC AUTO: 78.6 FL (ref 80–100)
MONOCYTES # BLD: 0.6 K/UL (ref 0–1.3)
MONOCYTES NFR BLD: 8.1 %
NEUTROPHILS # BLD: 5.3 K/UL (ref 1.7–7.7)
NEUTROPHILS NFR BLD: 73 %
NT-PROBNP SERPL-MCNC: 310 PG/ML (ref 0–449)
PERFORMED ON: ABNORMAL
PLATELET # BLD AUTO: 249 K/UL (ref 135–450)
PMV BLD AUTO: 9 FL (ref 5–10.5)
POTASSIUM SERPL-SCNC: 3.5 MMOL/L (ref 3.5–5.1)
PROCALCITONIN SERPL IA-MCNC: 0.03 NG/ML (ref 0–0.15)
RBC # BLD AUTO: 5.16 M/UL (ref 4–5.2)
SODIUM SERPL-SCNC: 136 MMOL/L (ref 136–145)
WBC # BLD AUTO: 7.2 K/UL (ref 4–11)

## 2023-10-06 PROCEDURE — 6370000000 HC RX 637 (ALT 250 FOR IP): Performed by: NURSE PRACTITIONER

## 2023-10-06 PROCEDURE — 94669 MECHANICAL CHEST WALL OSCILL: CPT

## 2023-10-06 PROCEDURE — 85025 COMPLETE CBC W/AUTO DIFF WBC: CPT

## 2023-10-06 PROCEDURE — 87070 CULTURE OTHR SPECIMN AEROBIC: CPT

## 2023-10-06 PROCEDURE — 6370000000 HC RX 637 (ALT 250 FOR IP): Performed by: INTERNAL MEDICINE

## 2023-10-06 PROCEDURE — 99233 SBSQ HOSP IP/OBS HIGH 50: CPT | Performed by: NURSE PRACTITIONER

## 2023-10-06 PROCEDURE — 83880 ASSAY OF NATRIURETIC PEPTIDE: CPT

## 2023-10-06 PROCEDURE — 87449 NOS EACH ORGANISM AG IA: CPT

## 2023-10-06 PROCEDURE — 83735 ASSAY OF MAGNESIUM: CPT

## 2023-10-06 PROCEDURE — 80048 BASIC METABOLIC PNL TOTAL CA: CPT

## 2023-10-06 PROCEDURE — 2700000000 HC OXYGEN THERAPY PER DAY

## 2023-10-06 PROCEDURE — 6370000000 HC RX 637 (ALT 250 FOR IP): Performed by: STUDENT IN AN ORGANIZED HEALTH CARE EDUCATION/TRAINING PROGRAM

## 2023-10-06 PROCEDURE — 36415 COLL VENOUS BLD VENIPUNCTURE: CPT

## 2023-10-06 PROCEDURE — 87205 SMEAR GRAM STAIN: CPT

## 2023-10-06 PROCEDURE — 94640 AIRWAY INHALATION TREATMENT: CPT

## 2023-10-06 PROCEDURE — 71045 X-RAY EXAM CHEST 1 VIEW: CPT

## 2023-10-06 PROCEDURE — 94761 N-INVAS EAR/PLS OXIMETRY MLT: CPT

## 2023-10-06 PROCEDURE — 99223 1ST HOSP IP/OBS HIGH 75: CPT | Performed by: INTERNAL MEDICINE

## 2023-10-06 PROCEDURE — 2580000003 HC RX 258: Performed by: NURSE PRACTITIONER

## 2023-10-06 PROCEDURE — 84145 PROCALCITONIN (PCT): CPT

## 2023-10-06 PROCEDURE — 1200000000 HC SEMI PRIVATE

## 2023-10-06 RX ORDER — TORSEMIDE 20 MG/1
20 TABLET ORAL 2 TIMES DAILY
Status: DISCONTINUED | OUTPATIENT
Start: 2023-10-06 | End: 2023-10-08 | Stop reason: HOSPADM

## 2023-10-06 RX ORDER — POTASSIUM CHLORIDE 20 MEQ/1
20 TABLET, EXTENDED RELEASE ORAL 2 TIMES DAILY
Status: DISCONTINUED | OUTPATIENT
Start: 2023-10-06 | End: 2023-10-08 | Stop reason: HOSPADM

## 2023-10-06 RX ORDER — PREDNISONE 20 MG/1
40 TABLET ORAL DAILY
Status: DISCONTINUED | OUTPATIENT
Start: 2023-10-06 | End: 2023-10-07

## 2023-10-06 RX ADMIN — SODIUM CHLORIDE, PRESERVATIVE FREE 10 ML: 5 INJECTION INTRAVENOUS at 09:01

## 2023-10-06 RX ADMIN — POTASSIUM CHLORIDE 20 MEQ: 1500 TABLET, EXTENDED RELEASE ORAL at 21:24

## 2023-10-06 RX ADMIN — AMLODIPINE BESYLATE 10 MG: 5 TABLET ORAL at 08:58

## 2023-10-06 RX ADMIN — FLUOXETINE 40 MG: 20 CAPSULE ORAL at 08:58

## 2023-10-06 RX ADMIN — GUAIFENESIN 600 MG: 600 TABLET ORAL at 08:59

## 2023-10-06 RX ADMIN — ATORVASTATIN CALCIUM 20 MG: 10 TABLET, FILM COATED ORAL at 21:25

## 2023-10-06 RX ADMIN — APIXABAN 5 MG: 5 TABLET, FILM COATED ORAL at 08:58

## 2023-10-06 RX ADMIN — TORSEMIDE 20 MG: 20 TABLET ORAL at 21:25

## 2023-10-06 RX ADMIN — LEVOTHYROXINE SODIUM 150 MCG: 0.15 TABLET ORAL at 06:18

## 2023-10-06 RX ADMIN — POTASSIUM CHLORIDE 20 MEQ: 1500 TABLET, EXTENDED RELEASE ORAL at 08:58

## 2023-10-06 RX ADMIN — Medication 2 PUFF: at 20:04

## 2023-10-06 RX ADMIN — POLYETHYLENE GLYCOL 3350 17 G: 17 POWDER, FOR SOLUTION ORAL at 21:26

## 2023-10-06 RX ADMIN — SODIUM CHLORIDE, PRESERVATIVE FREE 10 ML: 5 INJECTION INTRAVENOUS at 21:26

## 2023-10-06 RX ADMIN — FAMOTIDINE 10 MG: 20 TABLET ORAL at 08:59

## 2023-10-06 RX ADMIN — FAMOTIDINE 10 MG: 20 TABLET ORAL at 21:25

## 2023-10-06 RX ADMIN — EMPAGLIFLOZIN 10 MG: 10 TABLET, FILM COATED ORAL at 08:59

## 2023-10-06 RX ADMIN — POLYETHYLENE GLYCOL 3350 17 G: 17 POWDER, FOR SOLUTION ORAL at 08:59

## 2023-10-06 RX ADMIN — INSULIN LISPRO 4 UNITS: 100 INJECTION, SOLUTION INTRAVENOUS; SUBCUTANEOUS at 21:43

## 2023-10-06 RX ADMIN — ASPIRIN 81 MG: 81 TABLET, COATED ORAL at 08:59

## 2023-10-06 RX ADMIN — TORSEMIDE 20 MG: 20 TABLET ORAL at 08:59

## 2023-10-06 RX ADMIN — GUAIFENESIN 600 MG: 600 TABLET ORAL at 21:24

## 2023-10-06 RX ADMIN — PREDNISONE 40 MG: 20 TABLET ORAL at 15:59

## 2023-10-06 RX ADMIN — INSULIN LISPRO 1 UNITS: 100 INJECTION, SOLUTION INTRAVENOUS; SUBCUTANEOUS at 12:44

## 2023-10-06 RX ADMIN — APIXABAN 5 MG: 5 TABLET, FILM COATED ORAL at 21:25

## 2023-10-06 NOTE — CARE COORDINATION
Chart review day 3- pt from home, IPTA. SOB, CHF. Nursing to complete O2 walk test. Pulmonology consult. Cm will continue to follow for DCP needs.

## 2023-10-07 PROBLEM — I51.7 LVH (LEFT VENTRICULAR HYPERTROPHY): Status: ACTIVE | Noted: 2023-10-07

## 2023-10-07 PROBLEM — E66.9 CLASS 2 OBESITY IN ADULT: Status: ACTIVE | Noted: 2023-10-07

## 2023-10-07 PROBLEM — I35.0 AORTIC STENOSIS: Status: ACTIVE | Noted: 2023-10-07

## 2023-10-07 PROBLEM — J98.11 ATELECTASIS: Status: ACTIVE | Noted: 2023-10-07

## 2023-10-07 PROBLEM — E66.812 CLASS 2 OBESITY IN ADULT: Status: ACTIVE | Noted: 2023-10-07

## 2023-10-07 PROBLEM — E11.65 UNCONTROLLED TYPE 2 DIABETES MELLITUS WITH HYPERGLYCEMIA (HCC): Status: ACTIVE | Noted: 2023-10-07

## 2023-10-07 PROBLEM — R29.818 SUSPECTED SLEEP APNEA: Status: ACTIVE | Noted: 2023-10-07

## 2023-10-07 LAB
ANION GAP SERPL CALCULATED.3IONS-SCNC: 13 MMOL/L (ref 3–16)
BASOPHILS # BLD: 0 K/UL (ref 0–0.2)
BASOPHILS NFR BLD: 0.3 %
BUN SERPL-MCNC: 31 MG/DL (ref 7–20)
CALCIUM SERPL-MCNC: 9.1 MG/DL (ref 8.3–10.6)
CHLORIDE SERPL-SCNC: 96 MMOL/L (ref 99–110)
CO2 SERPL-SCNC: 25 MMOL/L (ref 21–32)
CREAT SERPL-MCNC: 0.7 MG/DL (ref 0.6–1.2)
DEPRECATED RDW RBC AUTO: 17.6 % (ref 12.4–15.4)
EOSINOPHIL # BLD: 0 K/UL (ref 0–0.6)
EOSINOPHIL NFR BLD: 0.1 %
GFR SERPLBLD CREATININE-BSD FMLA CKD-EPI: >60 ML/MIN/{1.73_M2}
GLUCOSE BLD-MCNC: 210 MG/DL (ref 70–99)
GLUCOSE BLD-MCNC: 249 MG/DL (ref 70–99)
GLUCOSE BLD-MCNC: 313 MG/DL (ref 70–99)
GLUCOSE BLD-MCNC: 326 MG/DL (ref 70–99)
GLUCOSE SERPL-MCNC: 253 MG/DL (ref 70–99)
HCT VFR BLD AUTO: 35.7 % (ref 36–48)
HGB BLD-MCNC: 12 G/DL (ref 12–16)
LEGIONELLA AG UR QL: NORMAL
LYMPHOCYTES # BLD: 0.7 K/UL (ref 1–5.1)
LYMPHOCYTES NFR BLD: 10.2 %
MAGNESIUM SERPL-MCNC: 2.3 MG/DL (ref 1.8–2.4)
MCH RBC QN AUTO: 28.7 PG (ref 26–34)
MCHC RBC AUTO-ENTMCNC: 33.7 G/DL (ref 31–36)
MCV RBC AUTO: 85 FL (ref 80–100)
MONOCYTES # BLD: 0.4 K/UL (ref 0–1.3)
MONOCYTES NFR BLD: 6.6 %
NEUTROPHILS # BLD: 5.6 K/UL (ref 1.7–7.7)
NEUTROPHILS NFR BLD: 82.8 %
PERFORMED ON: ABNORMAL
PLATELET # BLD AUTO: 200 K/UL (ref 135–450)
PMV BLD AUTO: 9.6 FL (ref 5–10.5)
POTASSIUM SERPL-SCNC: 4 MMOL/L (ref 3.5–5.1)
RBC # BLD AUTO: 4.2 M/UL (ref 4–5.2)
S PNEUM AG UR QL: NORMAL
SODIUM SERPL-SCNC: 134 MMOL/L (ref 136–145)
WBC # BLD AUTO: 6.7 K/UL (ref 4–11)

## 2023-10-07 PROCEDURE — 2580000003 HC RX 258: Performed by: NURSE PRACTITIONER

## 2023-10-07 PROCEDURE — 94640 AIRWAY INHALATION TREATMENT: CPT

## 2023-10-07 PROCEDURE — 6370000000 HC RX 637 (ALT 250 FOR IP): Performed by: INTERNAL MEDICINE

## 2023-10-07 PROCEDURE — 6370000000 HC RX 637 (ALT 250 FOR IP): Performed by: STUDENT IN AN ORGANIZED HEALTH CARE EDUCATION/TRAINING PROGRAM

## 2023-10-07 PROCEDURE — 99232 SBSQ HOSP IP/OBS MODERATE 35: CPT

## 2023-10-07 PROCEDURE — 99232 SBSQ HOSP IP/OBS MODERATE 35: CPT | Performed by: INTERNAL MEDICINE

## 2023-10-07 PROCEDURE — 80048 BASIC METABOLIC PNL TOTAL CA: CPT

## 2023-10-07 PROCEDURE — 94669 MECHANICAL CHEST WALL OSCILL: CPT

## 2023-10-07 PROCEDURE — 2700000000 HC OXYGEN THERAPY PER DAY

## 2023-10-07 PROCEDURE — 6370000000 HC RX 637 (ALT 250 FOR IP): Performed by: NURSE PRACTITIONER

## 2023-10-07 PROCEDURE — 36415 COLL VENOUS BLD VENIPUNCTURE: CPT

## 2023-10-07 PROCEDURE — 83735 ASSAY OF MAGNESIUM: CPT

## 2023-10-07 PROCEDURE — 85025 COMPLETE CBC W/AUTO DIFF WBC: CPT

## 2023-10-07 PROCEDURE — 1200000000 HC SEMI PRIVATE

## 2023-10-07 PROCEDURE — 94761 N-INVAS EAR/PLS OXIMETRY MLT: CPT

## 2023-10-07 PROCEDURE — 6370000000 HC RX 637 (ALT 250 FOR IP): Performed by: REGISTERED NURSE

## 2023-10-07 RX ORDER — INSULIN GLARGINE 100 [IU]/ML
INJECTION, SOLUTION SUBCUTANEOUS NIGHTLY
Status: CANCELLED | OUTPATIENT
Start: 2023-10-07

## 2023-10-07 RX ORDER — INSULIN LISPRO 100 [IU]/ML
5 INJECTION, SOLUTION INTRAVENOUS; SUBCUTANEOUS
Status: DISCONTINUED | OUTPATIENT
Start: 2023-10-07 | End: 2023-10-08 | Stop reason: HOSPADM

## 2023-10-07 RX ORDER — ALBUTEROL SULFATE 90 UG/1
2 AEROSOL, METERED RESPIRATORY (INHALATION) EVERY 6 HOURS PRN
Status: DISCONTINUED | OUTPATIENT
Start: 2023-10-07 | End: 2023-10-08 | Stop reason: HOSPADM

## 2023-10-07 RX ORDER — PREDNISONE 20 MG/1
20 TABLET ORAL DAILY
Status: DISCONTINUED | OUTPATIENT
Start: 2023-10-08 | End: 2023-10-08 | Stop reason: HOSPADM

## 2023-10-07 RX ORDER — BISACODYL 10 MG
10 SUPPOSITORY, RECTAL RECTAL DAILY PRN
Status: DISCONTINUED | OUTPATIENT
Start: 2023-10-07 | End: 2023-10-08 | Stop reason: HOSPADM

## 2023-10-07 RX ORDER — INSULIN LISPRO 100 [IU]/ML
3 INJECTION, SOLUTION INTRAVENOUS; SUBCUTANEOUS
Status: DISCONTINUED | OUTPATIENT
Start: 2023-10-07 | End: 2023-10-07

## 2023-10-07 RX ADMIN — Medication 2 PUFF: at 07:55

## 2023-10-07 RX ADMIN — ASPIRIN 81 MG: 81 TABLET, COATED ORAL at 08:48

## 2023-10-07 RX ADMIN — LEVOTHYROXINE SODIUM 150 MCG: 0.15 TABLET ORAL at 06:46

## 2023-10-07 RX ADMIN — SODIUM CHLORIDE, PRESERVATIVE FREE 10 ML: 5 INJECTION INTRAVENOUS at 20:14

## 2023-10-07 RX ADMIN — TORSEMIDE 20 MG: 20 TABLET ORAL at 20:13

## 2023-10-07 RX ADMIN — SACUBITRIL AND VALSARTAN 1 TABLET: 24; 26 TABLET, FILM COATED ORAL at 20:13

## 2023-10-07 RX ADMIN — GUAIFENESIN 600 MG: 600 TABLET ORAL at 08:49

## 2023-10-07 RX ADMIN — POLYETHYLENE GLYCOL 3350 17 G: 17 POWDER, FOR SOLUTION ORAL at 22:50

## 2023-10-07 RX ADMIN — AMLODIPINE BESYLATE 10 MG: 5 TABLET ORAL at 08:49

## 2023-10-07 RX ADMIN — FAMOTIDINE 10 MG: 20 TABLET ORAL at 08:49

## 2023-10-07 RX ADMIN — APIXABAN 5 MG: 5 TABLET, FILM COATED ORAL at 20:14

## 2023-10-07 RX ADMIN — INSULIN LISPRO 1 UNITS: 100 INJECTION, SOLUTION INTRAVENOUS; SUBCUTANEOUS at 08:48

## 2023-10-07 RX ADMIN — INSULIN LISPRO 3 UNITS: 100 INJECTION, SOLUTION INTRAVENOUS; SUBCUTANEOUS at 12:55

## 2023-10-07 RX ADMIN — APIXABAN 5 MG: 5 TABLET, FILM COATED ORAL at 08:49

## 2023-10-07 RX ADMIN — INSULIN LISPRO 5 UNITS: 100 INJECTION, SOLUTION INTRAVENOUS; SUBCUTANEOUS at 18:52

## 2023-10-07 RX ADMIN — POLYETHYLENE GLYCOL 3350 17 G: 17 POWDER, FOR SOLUTION ORAL at 08:57

## 2023-10-07 RX ADMIN — POTASSIUM CHLORIDE 20 MEQ: 1500 TABLET, EXTENDED RELEASE ORAL at 08:49

## 2023-10-07 RX ADMIN — EMPAGLIFLOZIN 10 MG: 10 TABLET, FILM COATED ORAL at 08:49

## 2023-10-07 RX ADMIN — FAMOTIDINE 10 MG: 20 TABLET ORAL at 20:14

## 2023-10-07 RX ADMIN — ATORVASTATIN CALCIUM 20 MG: 10 TABLET, FILM COATED ORAL at 20:13

## 2023-10-07 RX ADMIN — PREDNISONE 40 MG: 20 TABLET ORAL at 08:48

## 2023-10-07 RX ADMIN — INSULIN LISPRO 3 UNITS: 100 INJECTION, SOLUTION INTRAVENOUS; SUBCUTANEOUS at 18:53

## 2023-10-07 RX ADMIN — FLUOXETINE 40 MG: 20 CAPSULE ORAL at 08:49

## 2023-10-07 RX ADMIN — GUAIFENESIN 600 MG: 600 TABLET ORAL at 20:13

## 2023-10-07 RX ADMIN — SODIUM CHLORIDE, PRESERVATIVE FREE 10 ML: 5 INJECTION INTRAVENOUS at 08:55

## 2023-10-07 RX ADMIN — BISACODYL 10 MG: 10 SUPPOSITORY RECTAL at 23:40

## 2023-10-07 RX ADMIN — TORSEMIDE 20 MG: 20 TABLET ORAL at 08:48

## 2023-10-07 RX ADMIN — POTASSIUM CHLORIDE 20 MEQ: 1500 TABLET, EXTENDED RELEASE ORAL at 20:13

## 2023-10-07 RX ADMIN — SACUBITRIL AND VALSARTAN 1 TABLET: 24; 26 TABLET, FILM COATED ORAL at 08:50

## 2023-10-08 VITALS
TEMPERATURE: 98 F | RESPIRATION RATE: 17 BRPM | HEART RATE: 71 BPM | HEIGHT: 66 IN | OXYGEN SATURATION: 92 % | DIASTOLIC BLOOD PRESSURE: 82 MMHG | SYSTOLIC BLOOD PRESSURE: 119 MMHG | WEIGHT: 217 LBS | BODY MASS INDEX: 34.87 KG/M2

## 2023-10-08 LAB
ANION GAP SERPL CALCULATED.3IONS-SCNC: 11 MMOL/L (ref 3–16)
BACTERIA SPEC RESP CULT: NORMAL
BASOPHILS # BLD: 0.1 K/UL (ref 0–0.2)
BASOPHILS NFR BLD: 0.8 %
BUN SERPL-MCNC: 32 MG/DL (ref 7–20)
CALCIUM SERPL-MCNC: 9.1 MG/DL (ref 8.3–10.6)
CHLORIDE SERPL-SCNC: 95 MMOL/L (ref 99–110)
CO2 SERPL-SCNC: 30 MMOL/L (ref 21–32)
CREAT SERPL-MCNC: 0.7 MG/DL (ref 0.6–1.2)
DEPRECATED RDW RBC AUTO: 17.7 % (ref 12.4–15.4)
EOSINOPHIL # BLD: 0 K/UL (ref 0–0.6)
EOSINOPHIL NFR BLD: 0.5 %
GFR SERPLBLD CREATININE-BSD FMLA CKD-EPI: >60 ML/MIN/{1.73_M2}
GLUCOSE BLD-MCNC: 195 MG/DL (ref 70–99)
GLUCOSE SERPL-MCNC: 224 MG/DL (ref 70–99)
GRAM STN SPEC: NORMAL
HCT VFR BLD AUTO: 41.1 % (ref 36–48)
HGB BLD-MCNC: 13.7 G/DL (ref 12–16)
LYMPHOCYTES # BLD: 1.6 K/UL (ref 1–5.1)
LYMPHOCYTES NFR BLD: 17.8 %
MAGNESIUM SERPL-MCNC: 2.3 MG/DL (ref 1.8–2.4)
MCH RBC QN AUTO: 27.8 PG (ref 26–34)
MCHC RBC AUTO-ENTMCNC: 33.3 G/DL (ref 31–36)
MCV RBC AUTO: 83.6 FL (ref 80–100)
MONOCYTES # BLD: 0.8 K/UL (ref 0–1.3)
MONOCYTES NFR BLD: 8.8 %
NEUTROPHILS # BLD: 6.6 K/UL (ref 1.7–7.7)
NEUTROPHILS NFR BLD: 72.1 %
PERFORMED ON: ABNORMAL
PLATELET # BLD AUTO: 219 K/UL (ref 135–450)
PMV BLD AUTO: 10.2 FL (ref 5–10.5)
POTASSIUM SERPL-SCNC: 4 MMOL/L (ref 3.5–5.1)
RBC # BLD AUTO: 4.92 M/UL (ref 4–5.2)
SODIUM SERPL-SCNC: 136 MMOL/L (ref 136–145)
WBC # BLD AUTO: 9.2 K/UL (ref 4–11)

## 2023-10-08 PROCEDURE — 2580000003 HC RX 258: Performed by: NURSE PRACTITIONER

## 2023-10-08 PROCEDURE — 6370000000 HC RX 637 (ALT 250 FOR IP): Performed by: STUDENT IN AN ORGANIZED HEALTH CARE EDUCATION/TRAINING PROGRAM

## 2023-10-08 PROCEDURE — 6370000000 HC RX 637 (ALT 250 FOR IP)

## 2023-10-08 PROCEDURE — 6370000000 HC RX 637 (ALT 250 FOR IP): Performed by: NURSE PRACTITIONER

## 2023-10-08 PROCEDURE — 6370000000 HC RX 637 (ALT 250 FOR IP): Performed by: INTERNAL MEDICINE

## 2023-10-08 PROCEDURE — 83735 ASSAY OF MAGNESIUM: CPT

## 2023-10-08 PROCEDURE — 94669 MECHANICAL CHEST WALL OSCILL: CPT

## 2023-10-08 PROCEDURE — 36415 COLL VENOUS BLD VENIPUNCTURE: CPT

## 2023-10-08 PROCEDURE — 80048 BASIC METABOLIC PNL TOTAL CA: CPT

## 2023-10-08 PROCEDURE — 6370000000 HC RX 637 (ALT 250 FOR IP): Performed by: REGISTERED NURSE

## 2023-10-08 PROCEDURE — 85025 COMPLETE CBC W/AUTO DIFF WBC: CPT

## 2023-10-08 RX ORDER — TORSEMIDE 20 MG/1
20 TABLET ORAL 2 TIMES DAILY
Qty: 60 TABLET | Refills: 1 | Status: SHIPPED | OUTPATIENT
Start: 2023-10-08

## 2023-10-08 RX ORDER — GUAIFENESIN 600 MG/1
600 TABLET, EXTENDED RELEASE ORAL 2 TIMES DAILY
Qty: 20 TABLET | Refills: 0 | Status: SHIPPED | OUTPATIENT
Start: 2023-10-08 | End: 2023-10-18

## 2023-10-08 RX ORDER — PREDNISONE 20 MG/1
20 TABLET ORAL DAILY
Qty: 4 TABLET | Refills: 0 | Status: SHIPPED | OUTPATIENT
Start: 2023-10-09 | End: 2023-10-13

## 2023-10-08 RX ADMIN — EMPAGLIFLOZIN 10 MG: 10 TABLET, FILM COATED ORAL at 07:59

## 2023-10-08 RX ADMIN — POTASSIUM CHLORIDE 20 MEQ: 1500 TABLET, EXTENDED RELEASE ORAL at 07:59

## 2023-10-08 RX ADMIN — SODIUM CHLORIDE, PRESERVATIVE FREE 10 ML: 5 INJECTION INTRAVENOUS at 08:01

## 2023-10-08 RX ADMIN — TORSEMIDE 20 MG: 20 TABLET ORAL at 07:59

## 2023-10-08 RX ADMIN — LEVOTHYROXINE SODIUM 150 MCG: 0.15 TABLET ORAL at 06:20

## 2023-10-08 RX ADMIN — ASPIRIN 81 MG: 81 TABLET, COATED ORAL at 07:58

## 2023-10-08 RX ADMIN — FAMOTIDINE 10 MG: 20 TABLET ORAL at 07:58

## 2023-10-08 RX ADMIN — Medication 1 LOZENGE: at 04:28

## 2023-10-08 RX ADMIN — GUAIFENESIN 600 MG: 600 TABLET ORAL at 07:59

## 2023-10-08 RX ADMIN — SACUBITRIL AND VALSARTAN 1 TABLET: 24; 26 TABLET, FILM COATED ORAL at 07:58

## 2023-10-08 RX ADMIN — APIXABAN 5 MG: 5 TABLET, FILM COATED ORAL at 07:58

## 2023-10-08 RX ADMIN — AMLODIPINE BESYLATE 10 MG: 5 TABLET ORAL at 07:59

## 2023-10-08 RX ADMIN — PREDNISONE 20 MG: 20 TABLET ORAL at 07:59

## 2023-10-08 RX ADMIN — FLUOXETINE 40 MG: 20 CAPSULE ORAL at 07:59

## 2023-10-08 RX ADMIN — INSULIN LISPRO 5 UNITS: 100 INJECTION, SOLUTION INTRAVENOUS; SUBCUTANEOUS at 08:03

## 2023-10-08 RX ADMIN — POLYETHYLENE GLYCOL 3350 17 G: 17 POWDER, FOR SOLUTION ORAL at 07:59

## 2023-10-08 RX ADMIN — BISACODYL 10 MG: 10 SUPPOSITORY RECTAL at 07:59

## 2023-10-08 NOTE — DISCHARGE SUMMARY
Hospital Medicine Discharge Summary    Patient: Alejandro Escamilla   : [birth year]     Admit Date: 10/3/2023   Discharge Date: 10/8/2023    Disposition:  [x]Home   []HHC  []SNF  []ECF  []Acute Rehab  []LTAC  []Hospice  Code status:  [x]Full  []DNR/CCA  []Limited (DNR/CCA with Do Not Intubate)  []DNRCC  Condition at Discharge: Stable  Primary Care Provider: BASSAM Gutierrez - NP    Admitting Provider: Latonia Toledo MD  Discharge Provider: BASSAM Weaver     Discharge Diagnoses: Active Hospital Problems    Diagnosis     LVH (left ventricular hypertrophy) [I51.7]     Class 2 obesity in adult [E66.9]     Suspected sleep apnea [R29.818]     Uncontrolled type 2 diabetes mellitus with hyperglycemia (HCC) [E11.65]     Aortic stenosis [I35.0]     Atelectasis [J98.11]     Exertional dyspnea [R06.09]     Elevated troponin [R79.89]     Acute on chronic heart failure with preserved ejection fraction ([address]) [I50.33]        Presenting Admission History: This is a 69 y.o. female who presented to [facility] from her Cardiologists appointment with Dr. Constantine Morrow. PMHx significant for AFIB, DMII, HTN, CAD, HLD, and HFpEF. She presented to her Cardiology appointment with complaints of exertional SOB that has progressively getting worse over the past week or so. She has been fighting a URI and has completed two course of antibiotic therapy over the past three weeks but still has a persistent cough. Outpatient Cxr on  revealed cardiopulmonary congestion and without infiltrates or consolidation. She was newly diagnosed with CHF within the past year. IN the ED she is not hypoxic, sats are 92-94% on RA. CXR revealed evealed no change from prior examination suggestion of mild CHF. Hypoventilatory changes in the lung bases. She was hemodynamically stable and without fever.      Troponin 19 ---> 21  EKG AFIB with rate of 81  Assessment/Plan:       Current Principal Problem:  Acute on chronic

## 2023-10-08 NOTE — PLAN OF CARE
.CHF Care Plan      Patient's EF (Ejection Fraction) is greater than 40%    Heart Failure Medications:  Diuretics[de-identified] Furosemide and Hydrochlorothiazide     (One of the following REQUIRED for EF </= 40%/SYSTOLIC FAILURE but MAY be used in EF% >40%/DIASTOLIC FAILURE)        ACE[de-identified] Lisinopril        ARB[de-identified] None         ARNI[de-identified] None    (Beta Blockers)  NON- Evidenced Based Beta Blocker (for EF% >40%/DIASTOLIC FAILURE): None    Evidenced Based Beta Blocker::(REQUIRED for EF% <40%/SYSTOLIC FAILURE) None  . .................................................................................................................................................. Healthy Weight Tracking - BMI + Meds 4/25/2023 6/1/2023 10/3/2023 10/3/2023 10/3/2023 10/4/2023 10/5/2023   Weight 228 lb 224 lb 6.4 oz 226 lb 226 lb 224 lb 220 lb 12.8 oz 218 lb 14.4 oz   Height 5' 6\" 5' 6\" 5' 6\" - 5' 6\" - -   Body Mass Index 36.8 kg/m2 36.22 kg/m2 36.48 kg/m2 - 36.15 kg/m2 35.64 kg/m2 35.33 kg/m2   Some recent data might be hidden         Patient's weights and intake/output reviewed: Yes    Daily Weight log at bedside, patient/family participation in use of log: \"yes    Patient's Last Weight: 218 lbs obtained by standing scale. Difference of 2 lbs less than last documented weight. Intake/Output Summary (Last 24 hours) at 10/5/2023 1458  Last data filed at 10/5/2023 1250  Gross per 24 hour   Intake 870 ml   Output 2350 ml   Net -1480 ml       Education Booklet Provided: yes    Comorbidities Reviewed Yes    Patient has a past medical history of CHF (congestive heart failure), NYHA class I, acute on chronic, combined ([address]), Coronary artery disease, non-occlusive, DJD (degenerative joint disease), Hyperlipidemia, Hypertension, Hypothyroidism, Morbid obesity due to excess calories ([address]), and Type II or unspecified type diabetes mellitus without mention of complication, not stated as uncontrolled.      >>For CHF and Comorbidity documentation on Education Time
CHF Care Plan      Patient's EF (Ejection Fraction) is greater than 40%    Heart Failure Medications:  Diuretics[de-identified] Furosemide    (One of the following REQUIRED for EF </= 40%/SYSTOLIC FAILURE but MAY be used in EF% >40%/DIASTOLIC FAILURE)        ACE[de-identified] Lisinopril        ARB[de-identified] None         ARNI[de-identified] None    (Beta Blockers)  NON- Evidenced Based Beta Blocker (for EF% >40%/DIASTOLIC FAILURE): None    Evidenced Based Beta Blocker::(REQUIRED for EF% <40%/SYSTOLIC FAILURE) None  . .................................................................................................................................................. Healthy Weight Tracking - BMI + Meds 3/27/2023 4/25/2023 6/1/2023 10/3/2023 10/3/2023 10/3/2023 10/4/2023   Weight 235 lb 228 lb 224 lb 6.4 oz 226 lb 226 lb 224 lb 220 lb 12.8 oz   Height 5' 5\" 5' 6\" 5' 6\" 5' 6\" - 5' 6\" -   Body Mass Index 39.1 kg/m2 36.8 kg/m2 36.22 kg/m2 36.48 kg/m2 - 36.15 kg/m2 35.64 kg/m2   Some recent data might be hidden         Patient's weights and intake/output reviewed: Yes    Daily Weight log at bedside, patient/family participation in use of log: \"yes    Patient's Last Weight: 100.2 kg obtained by standing scale. Difference of 1.4 kg less than last documented weight. Intake/Output Summary (Last 24 hours) at 10/4/2023 2322  Last data filed at 10/4/2023 4106  Gross per 24 hour   Intake 150 ml   Output 225 ml   Net -75 ml       Education Booklet Provided: yes    Comorbidities Reviewed Yes    Patient has a past medical history of CHF (congestive heart failure), NYHA class I, acute on chronic, combined ([address]), Coronary artery disease, non-occlusive, DJD (degenerative joint disease), Hyperlipidemia, Hypertension, Hypothyroidism, Morbid obesity due to excess calories ([address]), and Type II or unspecified type diabetes mellitus without mention of complication, not stated as uncontrolled.      >>For CHF and Comorbidity documentation on Education Time and Topics, please see Education
CHF Care Plan      Patient's EF (Ejection Fraction) is greater than 40%    Heart Failure Medications:  Diuretics[de-identified] Furosemide    (One of the following REQUIRED for EF </= 40%/SYSTOLIC FAILURE but MAY be used in EF% >40%/DIASTOLIC FAILURE)        ACE[de-identified] Lisinopril        ARB[de-identified] None         ARNI[de-identified] None    (Beta Blockers)  NON- Evidenced Based Beta Blocker (for EF% >40%/DIASTOLIC FAILURE): None    Evidenced Based Beta Blocker::(REQUIRED for EF% <40%/SYSTOLIC FAILURE) None  . .................................................................................................................................................. Healthy Weight Tracking - BMI + Meds 3/27/2023 4/25/2023 6/1/2023 10/3/2023 10/3/2023 10/3/2023 10/4/2023   Weight 235 lb 228 lb 224 lb 6.4 oz 226 lb 226 lb 224 lb 220 lb 12.8 oz   Height 5' 5\" 5' 6\" 5' 6\" 5' 6\" - 5' 6\" -   Body Mass Index 39.1 kg/m2 36.8 kg/m2 36.22 kg/m2 36.48 kg/m2 - 36.15 kg/m2 35.64 kg/m2   Some recent data might be hidden         Patient's weights and intake/output reviewed: Yes    Daily Weight log at bedside, patient/family participation in use of log: \"yes    Patient's Last Weight: 220 lbs obtained by standing scale. Difference of 4 lbs less than last documented weight (previous documented was stated)      Intake/Output Summary (Last 24 hours) at 10/4/2023 1306  Last data filed at 10/4/2023 1215  Gross per 24 hour   Intake 350 ml   Output 1100 ml   Net -750 ml       Education Booklet Provided: yes    Comorbidities Reviewed Yes    Patient has a past medical history of CHF (congestive heart failure), NYHA class I, acute on chronic, combined ([address]), Coronary artery disease, non-occlusive, DJD (degenerative joint disease), Hyperlipidemia, Hypertension, Hypothyroidism, Morbid obesity due to excess calories ([address]), and Type II or unspecified type diabetes mellitus without mention of complication, not stated as uncontrolled.      >>For CHF and Comorbidity documentation on Education Time
CHF Care Plan      Patient's EF (Ejection Fraction) is greater than 40%    Heart Failure Medications:  Diuretics[de-identified] Furosemide    (One of the following REQUIRED for EF </= 40%/SYSTOLIC FAILURE but MAY be used in EF% >40%/DIASTOLIC FAILURE)        ACE[de-identified] Lisinopril        ARB[de-identified] None         ARNI[de-identified] None    (Beta Blockers)  NON- Evidenced Based Beta Blocker (for EF% >40%/DIASTOLIC FAILURE): None    Evidenced Based Beta Blocker::(REQUIRED for EF% <40%/SYSTOLIC FAILURE) None  . .................................................................................................................................................. Healthy Weight Tracking - BMI + Meds 4/25/2023 6/1/2023 10/3/2023 10/3/2023 10/3/2023 10/4/2023 10/5/2023   Weight 228 lb 224 lb 6.4 oz 226 lb 226 lb 224 lb 220 lb 12.8 oz 218 lb 14.4 oz   Height 5' 6\" 5' 6\" 5' 6\" - 5' 6\" - -   Body Mass Index 36.8 kg/m2 36.22 kg/m2 36.48 kg/m2 - 36.15 kg/m2 35.64 kg/m2 35.33 kg/m2   Some recent data might be hidden         Patient's weights and intake/output reviewed: Yes    Daily Weight log at bedside, patient/family participation in use of log: \"yes    Patient's Last Weight: 99.3 kg obtained by standing scale. Difference of 0.9 kg less than last documented weight. Intake/Output Summary (Last 24 hours) at 10/5/2023 0530  Last data filed at 10/5/2023 0522  Gross per 24 hour   Intake 640 ml   Output 2250 ml   Net -1610 ml       Education Booklet Provided: yes    Comorbidities Reviewed Yes    Patient has a past medical history of CHF (congestive heart failure), NYHA class I, acute on chronic, combined ([address]), Coronary artery disease, non-occlusive, DJD (degenerative joint disease), Hyperlipidemia, Hypertension, Hypothyroidism, Morbid obesity due to excess calories ([address]), and Type II or unspecified type diabetes mellitus without mention of complication, not stated as uncontrolled.      >>For CHF and Comorbidity documentation on Education Time and Topics, please see
CHF Care Plan      Patient's EF (Ejection Fraction) is greater than 40%    Heart Failure Medications:  Diuretics[de-identified] Torsemide    (One of the following REQUIRED for EF </= 40%/SYSTOLIC FAILURE but MAY be used in EF% >40%/DIASTOLIC FAILURE)        ACE[de-identified] None        ARB[de-identified] None         ARNI[de-identified] Sacubitril/Valsartan-Entresto    (Beta Blockers)  NON- Evidenced Based Beta Blocker (for EF% >40%/DIASTOLIC FAILURE): None    Evidenced Based Beta Blocker::(REQUIRED for EF% <40%/SYSTOLIC FAILURE) None  . .................................................................................................................................................. Healthy Weight Tracking - BMI + Meds 6/1/2023 10/3/2023 10/3/2023 10/3/2023 10/4/2023 10/5/2023 10/6/2023   Weight 224 lb 6.4 oz 226 lb 226 lb 224 lb 220 lb 12.8 oz 218 lb 14.4 oz 220 lb   Height 5' 6\" 5' 6\" - 5' 6\" - - -   Body Mass Index 36.22 kg/m2 36.48 kg/m2 - 36.15 kg/m2 35.64 kg/m2 35.33 kg/m2 35.51 kg/m2   Some recent data might be hidden         Patient's weights and intake/output reviewed: Yes    Daily Weight log at bedside, patient/family participation in use of log: \"yes    Patient's Last Weight: 220 lbs obtained by standing scale. Difference of 2 lbs more than last documented weight. Intake/Output Summary (Last 24 hours) at 10/6/2023 5108  Last data filed at 10/6/2023 1559  Gross per 24 hour   Intake 1180 ml   Output 2500 ml   Net -1320 ml       Education Booklet Provided: yes    Comorbidities Reviewed Yes    Patient has a past medical history of CHF (congestive heart failure), NYHA class I, acute on chronic, combined ([address]), Coronary artery disease, non-occlusive, DJD (degenerative joint disease), Hyperlipidemia, Hypertension, Hypothyroidism, Morbid obesity due to excess calories ([address]), and Type II or unspecified type diabetes mellitus without mention of complication, not stated as uncontrolled.      >>For CHF and Comorbidity documentation on Education Time and Topics,
Problem: Discharge Planning  Goal: Discharge to home or other facility with appropriate resources  10/4/2023 0250 by Teresa Bell RN  Outcome: Progressing  10/3/2023 1428 by Rc Hendrickson RN  Outcome: Progressing     Problem: ABCDS Injury Assessment  Goal: Absence of physical injury  10/4/2023 0250 by Teresa Bell RN  Outcome: Progressing  10/3/2023 1428 by Rc Hendrickson RN  Outcome: Progressing     Problem: Respiratory - Adult  Goal: Achieves optimal ventilation and oxygenation  Outcome: Progressing     Problem: Cardiovascular - Adult  Goal: Maintains optimal cardiac output and hemodynamic stability  Outcome: Progressing     Problem: Cardiovascular - Adult  Goal: Absence of cardiac dysrhythmias or at baseline  Outcome: Progressing
Problem: Discharge Planning  Goal: Discharge to home or other facility with appropriate resources  10/4/2023 1310 by Sharla Echevarria RN  Outcome: Progressing  10/4/2023 0250 by Fallon Valera RN  Outcome: Progressing   Plans for home at discharge  Problem: ABCDS Injury Assessment  Goal: Absence of physical injury  10/4/2023 1310 by Sharla Echevarria RN  Outcome: Progressing  10/4/2023 0250 by Fallon Valera RN  Outcome: Progressing   Fall precautions in place. Bed in low position, alarm on, non-skid socks on, call light within reach.    Problem: Cardiovascular - Adult  Goal: Maintains optimal cardiac output and hemodynamic stability  10/4/2023 0250 by Fallon Valera RN  Outcome: Progressing  Goal: Absence of cardiac dysrhythmias or at baseline  10/4/2023 0250 by Fallon Valera RN  Outcome: Progressing   Monitoring on telemetry, cardiologist consulted, iv lasix given as ordered, patient voiding in hat for measuring urine
Problem: Discharge Planning  Goal: Discharge to home or other facility with appropriate resources  10/6/2023 1329 by Brendon Silva RN  Outcome: Progressing  Flowsheets (Taken 10/6/2023 0730)  Discharge to home or other facility with appropriate resources: Identify barriers to discharge with patient and caregiver     Problem: ABCDS Injury Assessment  Goal: Absence of physical injury  10/6/2023 1329 by Brendon Silva RN  Outcome: Progressing     Problem: Respiratory - Adult  Goal: Achieves optimal ventilation and oxygenation  10/6/2023 1329 by Brendon Silva RN  Outcome: Progressing  Flowsheets (Taken 10/6/2023 0730)  Achieves optimal ventilation and oxygenation: Assess for changes in respiratory status     Problem: Cardiovascular - Adult  Goal: Maintains optimal cardiac output and hemodynamic stability  10/6/2023 1329 by Brendon Silva RN  Outcome: Progressing  Flowsheets (Taken 10/6/2023 0730)  Maintains optimal cardiac output and hemodynamic stability: Monitor blood pressure and heart rate    Goal: Absence of cardiac dysrhythmias or at baseline  10/6/2023 1329 by Brendon Silva RN  Outcome: Progressing  Flowsheets (Taken 10/6/2023 0730)  Absence of cardiac dysrhythmias or at baseline: Monitor cardiac rate and rhythm     Problem: Safety - Adult  Goal: Free from fall injury  10/6/2023 1329 by Brendon Silva RN  Outcome: Progressing     Problem: Chronic Conditions and Co-morbidities  Goal: Patient's chronic conditions and co-morbidity symptoms are monitored and maintained or improved  Outcome: Progressing  Flowsheets (Taken 10/6/2023 0730)  Care Plan - Patient's Chronic Conditions and Co-Morbidity Symptoms are Monitored and Maintained or Improved: Monitor and assess patient's chronic conditions and comorbid symptoms for stability, deterioration, or improvement
Problem: Discharge Planning  Goal: Discharge to home or other facility with appropriate resources  10/7/2023 0042 by Jacinda Vides RN  Outcome: Progressing  Flowsheets (Taken 10/6/2023 2015)  Discharge to home or other facility with appropriate resources: Identify barriers to discharge with patient and caregiver  10/6/2023 1329 by Saint Mcgill, RN  Outcome: Progressing  Flowsheets (Taken 10/6/2023 0730)  Discharge to home or other facility with appropriate resources: Identify barriers to discharge with patient and caregiver     Problem: ABCDS Injury Assessment  Goal: Absence of physical injury  10/7/2023 0042 by Jacinda Vides RN  Outcome: Progressing  10/6/2023 1329 by Saint Mcgill, RN  Outcome: Progressing     Problem: Respiratory - Adult  Goal: Achieves optimal ventilation and oxygenation  10/7/2023 0042 by Jacinda Vides RN  Outcome: Progressing  Flowsheets (Taken 10/6/2023 2015)  Achieves optimal ventilation and oxygenation: Assess for changes in respiratory status  10/6/2023 1329 by Saint Mcgill, RN  Outcome: Progressing  Flowsheets (Taken 10/6/2023 0730)  Achieves optimal ventilation and oxygenation: Assess for changes in respiratory status     Problem: Cardiovascular - Adult  Goal: Maintains optimal cardiac output and hemodynamic stability  10/7/2023 0042 by Jacinda Vides RN  Outcome: Progressing  Flowsheets (Taken 10/6/2023 2015)  Maintains optimal cardiac output and hemodynamic stability: Monitor blood pressure and heart rate  10/6/2023 1329 by Saint Mcgill, RN  Outcome: Progressing  Flowsheets (Taken 10/6/2023 0730)  Maintains optimal cardiac output and hemodynamic stability: Monitor blood pressure and heart rate  Goal: Absence of cardiac dysrhythmias or at baseline  10/7/2023 0042 by Jacinda Vides RN  Outcome: Progressing  Flowsheets (Taken 10/6/2023 2015)  Absence of cardiac dysrhythmias or at baseline: Monitor cardiac rate and rhythm  10/6/2023 1329 by Saint Mcgill, RN  Outcome: Progressing  Flowsheets
Problem: Discharge Planning  Goal: Discharge to home or other facility with appropriate resources  Outcome: Progressing     Problem: ABCDS Injury Assessment  Goal: Absence of physical injury  Outcome: Progressing
Problem: Discharge Planning  Goal: Discharge to home or other facility with appropriate resources  Outcome: Progressing     Problem: ABCDS Injury Assessment  Goal: Absence of physical injury  Outcome: Progressing     Problem: Respiratory - Adult  Goal: Achieves optimal ventilation and oxygenation  Outcome: Progressing     Problem: Cardiovascular - Adult  Goal: Maintains optimal cardiac output and hemodynamic stability  Outcome: Progressing  Goal: Absence of cardiac dysrhythmias or at baseline  Outcome: Progressing     Problem: Safety - Adult  Goal: Free from fall injury  Outcome: Progressing
Problem: Discharge Planning  Goal: Discharge to home or other facility with appropriate resources  Outcome: Progressing     Problem: ABCDS Injury Assessment  Goal: Absence of physical injury  Outcome: Progressing     Problem: Respiratory - Adult  Goal: Achieves optimal ventilation and oxygenation  Outcome: Progressing     Problem: Cardiovascular - Adult  Goal: Maintains optimal cardiac output and hemodynamic stability  Outcome: Progressing  Goal: Absence of cardiac dysrhythmias or at baseline  Outcome: Progressing     Problem: Safety - Adult  Goal: Free from fall injury  Outcome: Progressing     Problem: Chronic Conditions and Co-morbidities  Goal: Patient's chronic conditions and co-morbidity symptoms are monitored and maintained or improved  Outcome: Progressing
Tab    Progressive Mobility Assessment:  What is this patient's Current Level of Mobility?: Ambulatory-Up Ad Sarina  How was this patient Mobilized today?: Edge of Bed, Up to Chair,  Up to Toilet/Shower, and Up in Room, ambulated 10 ft                 With Whom? Self                 Level of Difficulty/Assistance: Independent     Pt resting in bed at this time on  1 L O2. Pt denies shortness of breath. Pt without lower extremity edema.      Patient and/or Family's stated Goal of Care this Admission: increase activity tolerance, better understand heart failure and disease management, and be more comfortable prior to discharge        :
please see Education Tab    Progressive Mobility Assessment:  What is this patient's Current Level of Mobility?: Ambulatory- with Assistance  How was this patient Mobilized today?: Edge of Bed,  Up to Toilet/Shower, Up in Room, and Up in Hallway, ambulated 240 ft                 With Whom? Nurse and PCA                 Level of Difficulty/Assistance: 1x Assist     Pt resting in bed at this time on room air. Pt denies shortness of breath. Pt without lower extremity edema.      Patient and/or Family's stated Goal of Care this Admission: reduce shortness of breath, increase activity tolerance, better understand heart failure and disease management, be more comfortable, and reduce lower extremity edema prior to discharge        :
please see Education Tab    Progressive Mobility Assessment:  What is this patient's Current Level of Mobility?: Ambulatory-Up Ad Sarina  How was this patient Mobilized today?: Edge of Bed,  Up to Toilet/Shower, and Up in Room, ambulated 20 ft                 With Whom? Nurse and Self                 Level of Difficulty/Assistance: Independent     Pt resting in bed at this time on  1 L O2. Pt denies shortness of breath. Pt without lower extremity edema.      Patient and/or Family's stated Goal of Care this Admission: increase activity tolerance, better understand heart failure and disease management, and be more comfortable prior to discharge        :     Problem: Discharge Planning  Goal: Discharge to home or other facility with appropriate resources  Outcome: Progressing     Problem: ABCDS Injury Assessment  Goal: Absence of physical injury  Outcome: Progressing     Problem: Respiratory - Adult  Goal: Achieves optimal ventilation and oxygenation  Outcome: Progressing     Problem: Cardiovascular - Adult  Goal: Maintains optimal cardiac output and hemodynamic stability  Outcome: Progressing  Goal: Absence of cardiac dysrhythmias or at baseline  Outcome: Progressing     Problem: Safety - Adult  Goal: Free from fall injury  Outcome: Progressing
see Education Tab    Progressive Mobility Assessment:  What is this patient's Current Level of Mobility?: Ambulatory- with Assistance  How was this patient Mobilized today?: Edge of Bed, Up to Chair, and Up in Room, ambulated 10 ft                 With Whom? Nurse                 Level of Difficulty/Assistance: 1x Assist     Pt resting in bed at this time on room air. Pt denies shortness of breath. Pt without lower extremity edema.      Patient and/or Family's stated Goal of Care this Admission: reduce shortness of breath, increase activity tolerance, better understand heart failure and disease management, be more comfortable, and reduce lower extremity edema prior to discharge        :

## 2023-10-09 ENCOUNTER — TELEPHONE (OUTPATIENT)
Dept: PULMONOLOGY | Age: 78
End: 2023-10-09

## 2023-10-09 DIAGNOSIS — R06.09 EXERTIONAL DYSPNEA: Primary | ICD-10-CM

## 2023-10-09 NOTE — TELEPHONE ENCOUNTER
Walter Soto MD sent to [facility] Clinical Staff  PFT along with 6-minute walk test in 3 weeks time and follow-up with Dr Raul Diane can decide if patient needs sleep studies  Scheduled.

## 2023-10-11 ENCOUNTER — FOLLOWUP TELEPHONE ENCOUNTER (OUTPATIENT)
Dept: TELEMETRY | Age: 78
End: 2023-10-11

## 2023-10-11 NOTE — TELEPHONE ENCOUNTER
Care Transitions Initial Follow Up Call    Call within 2 business days of discharge: Yes     Patient: Jak Veliz Patient :  MRN: [id number]    [unfilled]    RARS: Readmission Risk Score: 10.3       Spoke with: patient     Discharge department/facility: home    Non-face-to-face services provided:  Scheduled appointment with PCP-10/10/23    Spoke to patient for hospital follow up. States she saw her PCP yesterday and states she feels well. States she is monitoring her fluid and sodium levels. Has a cardiology appt on 10/19/23. Denies any needs at this time.      Follow Up  Future Appointments   Date Time Provider [address]   10/19/2023  1:30 PM BASSAM Gibbs - ROBERT Meyers [facility]   2023  8:00 AM SCHEDULE, MHAZ PFT MHAZ PFT [facility]   2023  9:00 AM Lisa Nagel MD AND PULM [facility]   2023  8:30 AM Larissa Gross MD Marcela Ludwig [facility]       Bismark Henriquez RN

## 2023-10-12 NOTE — PROGRESS NOTES
[facility]  Office Visit    Kristina Small  [birth year]    October 19, 2023    CC:   Chief Complaint   Patient presents with    Follow-Up from Hospital     2 weeks    Atrial Fibrillation    Hypertension    Hyperlipidemia    Coronary Artery Disease     HPI:  The patient is 69 y.o. female with a past medical history notable for nonobstructive CAD (cath 3/4/22), RBBB, AV block, HTN, atrial fibrillation, HLP, morbid obesity, gastric bypass surgery,  and type II diabetes mellitus here for follow up d/t recent addition of diuretic per her PCP. She was last seen in March 2023 by Dr. Ileana Jones and followed by EP in June 2023. She was hospitalized from 10/3/23-10/8/23 with exacerbation of her CHF. She was seen here in the office on 10/3 and noted to be hypoxic and SOB. She was sent to ED and admitted for diuresis. Despite diuresis she remained hypoxic and seen by pulmonary. Plan for PFT, 6 minute walk test  and sleep study as outpatient. She had medications adjusted and now on Eliquis, Entresto. Here for hospital follow up. Patient first evaluated 2/23/2022 and ECG demonstrated AF with HR of 68 bpm at that time. She was started on Eliquis. She underwent a cardiac cath on 3/4/2022 that demonstrated mild-moderate, nonobstructive CAD. Patient wore a cardiac event monitor from 2/23/2022 to 2/25/2022 which demonstrated persistent AF with an average HR of 73 (). The duration of the AF is unknown. Patient underwent successful CV on 7/28/2022. She was noted to have recurrence of atrial fibrillation in follow-up. Persistent by repeat monitor. Rate control strategy elected it appears per EP. Overall doing well. Continues to have some weakness with increased exertion. Has chronic tinnitus. SOB markedly improved. Remains on torsemide BID and questioning if she needs it long term. Home weight: discharged at 220#; this .8#. She is feeling \"dried out. \"  Home BP: 103-120/60's.  BS slightly elevated

## 2023-10-19 ENCOUNTER — OFFICE VISIT (OUTPATIENT)
Dept: CARDIOLOGY CLINIC | Age: 78
End: 2023-10-19
Payer: MEDICARE

## 2023-10-19 VITALS
SYSTOLIC BLOOD PRESSURE: 124 MMHG | HEART RATE: 69 BPM | HEIGHT: 66 IN | DIASTOLIC BLOOD PRESSURE: 60 MMHG | OXYGEN SATURATION: 90 % | BODY MASS INDEX: 34.23 KG/M2 | WEIGHT: 213 LBS

## 2023-10-19 DIAGNOSIS — I48.19 PERSISTENT ATRIAL FIBRILLATION (HCC): ICD-10-CM

## 2023-10-19 DIAGNOSIS — I25.10 CORONARY ARTERY DISEASE, NON-OCCLUSIVE: ICD-10-CM

## 2023-10-19 DIAGNOSIS — I35.0 NONRHEUMATIC AORTIC VALVE STENOSIS: ICD-10-CM

## 2023-10-19 DIAGNOSIS — E78.2 MIXED HYPERLIPIDEMIA: ICD-10-CM

## 2023-10-19 DIAGNOSIS — I50.33 ACUTE ON CHRONIC HEART FAILURE WITH PRESERVED EJECTION FRACTION (HFPEF) (HCC): Primary | ICD-10-CM

## 2023-10-19 DIAGNOSIS — E11.9 TYPE 2 DIABETES MELLITUS WITHOUT COMPLICATION, WITHOUT LONG-TERM CURRENT USE OF INSULIN (HCC): ICD-10-CM

## 2023-10-19 DIAGNOSIS — I10 ESSENTIAL HYPERTENSION: ICD-10-CM

## 2023-10-19 PROCEDURE — 3052F HG A1C>EQUAL 8.0%<EQUAL 9.0%: CPT | Performed by: NURSE PRACTITIONER

## 2023-10-19 PROCEDURE — 3078F DIAST BP <80 MM HG: CPT | Performed by: NURSE PRACTITIONER

## 2023-10-19 PROCEDURE — 99214 OFFICE O/P EST MOD 30 MIN: CPT | Performed by: NURSE PRACTITIONER

## 2023-10-19 PROCEDURE — 3074F SYST BP LT 130 MM HG: CPT | Performed by: NURSE PRACTITIONER

## 2023-10-19 PROCEDURE — 1123F ACP DISCUSS/DSCN MKR DOCD: CPT | Performed by: NURSE PRACTITIONER

## 2023-10-19 RX ORDER — TORSEMIDE 20 MG/1
TABLET ORAL
Qty: 60 TABLET | Refills: 1 | Status: SHIPPED | OUTPATIENT
Start: 2023-10-19

## 2023-10-19 NOTE — PATIENT INSTRUCTIONS
Decrease Torsemide to 20 mg daily (take additional tablet if > 3-5 # weight gain in 5-7 days    Continue other medications    Lab work: BMP and BNP in 1-2 weeks (Nonfasting labs)

## 2023-11-02 ENCOUNTER — HOSPITAL ENCOUNTER (OUTPATIENT)
Age: 78
Discharge: HOME | End: 2023-11-02
Payer: MEDICARE

## 2023-11-02 DIAGNOSIS — I50.33 ACUTE ON CHRONIC HEART FAILURE WITH PRESERVED EJECTION FRACTION (HFPEF) (HCC): ICD-10-CM

## 2023-11-02 LAB
ANION GAP SERPL CALCULATED.3IONS-SCNC: 12 MMOL/L (ref 3–16)
BUN SERPL-MCNC: 21 MG/DL (ref 7–20)
CALCIUM SERPL-MCNC: 9.2 MG/DL (ref 8.3–10.6)
CHLORIDE SERPL-SCNC: 100 MMOL/L (ref 99–110)
CO2 SERPL-SCNC: 28 MMOL/L (ref 21–32)
CREAT SERPL-MCNC: 0.7 MG/DL (ref 0.6–1.2)
GFR SERPLBLD CREATININE-BSD FMLA CKD-EPI: >60 ML/MIN/{1.73_M2}
GLUCOSE SERPL-MCNC: 134 MG/DL (ref 70–99)
NT-PROBNP SERPL-MCNC: 833 PG/ML (ref 0–449)
POTASSIUM SERPL-SCNC: 3.5 MMOL/L (ref 3.5–5.1)
SODIUM SERPL-SCNC: 140 MMOL/L (ref 136–145)

## 2023-11-02 PROCEDURE — 83880 ASSAY OF NATRIURETIC PEPTIDE: CPT

## 2023-11-02 PROCEDURE — 36415 COLL VENOUS BLD VENIPUNCTURE: CPT

## 2023-11-02 PROCEDURE — 80048 BASIC METABOLIC PNL TOTAL CA: CPT

## 2023-11-03 DIAGNOSIS — I25.10 CORONARY ARTERY DISEASE, NON-OCCLUSIVE: ICD-10-CM

## 2023-11-03 DIAGNOSIS — I48.19 PERSISTENT ATRIAL FIBRILLATION (HCC): Primary | ICD-10-CM

## 2023-11-03 DIAGNOSIS — R07.2 PRECORDIAL PAIN: ICD-10-CM

## 2023-11-03 DIAGNOSIS — I50.33 ACUTE ON CHRONIC HEART FAILURE WITH PRESERVED EJECTION FRACTION (HCC): ICD-10-CM

## 2023-11-03 PROBLEM — R79.89 ELEVATED TROPONIN: Status: RESOLVED | Noted: 2023-10-04 | Resolved: 2023-11-03

## 2023-11-03 RX ORDER — POTASSIUM CHLORIDE 1.5 G/1.58G
POWDER, FOR SOLUTION ORAL
Qty: 60 EACH | Refills: 0 | Status: SHIPPED | OUTPATIENT
Start: 2023-11-03

## 2023-11-03 NOTE — TELEPHONE ENCOUNTER
----- Message from BASSAM Vincent CNP sent at 11/3/2023  8:54 AM EDT -----  Labs reviewed. BNP elevated suggesting some fluid overload. We recently decreased her torsemide to daily with add'l dose as needed. Recommend she continue twice a day dosing x 3 days and then decrease back to daily only and 2nd dose if sxs as we discussed during office visit. Her K+ is borderline, and so recommend adding Klor Con 20 mEq daily (Take 2 tabs on days she takes 2 torsemide). Recheck BMP in 6-8 weeks. Please call.

## 2023-11-06 ENCOUNTER — TELEPHONE (OUTPATIENT)
Dept: CARDIOLOGY CLINIC | Age: 78
End: 2023-11-06

## 2023-11-06 ENCOUNTER — HOSPITAL ENCOUNTER (OUTPATIENT)
Dept: PULMONOLOGY | Age: 78
Discharge: HOME | End: 2023-11-06
Payer: MEDICARE

## 2023-11-06 VITALS — OXYGEN SATURATION: 95 %

## 2023-11-06 DIAGNOSIS — R06.09 EXERTIONAL DYSPNEA: ICD-10-CM

## 2023-11-06 LAB
DLCO %PRED: NORMAL
DLCO PRED: NORMAL
DLCO/VA %PRED: NORMAL
DLCO/VA PRED: NORMAL
DLCO/VA: 86 ML/MIN/MMHG
DLCO: NORMAL
EXPIRATORY TIME-POST: NORMAL
EXPIRATORY TIME: NORMAL
FEF 25-75% %CHNG: NORMAL
FEF 25-75% %PRED-POST: NORMAL
FEF 25-75% %PRED-PRE: NORMAL
FEF 25-75% PRED: NORMAL
FEF 25-75%-POST: NORMAL
FEF 25-75%-PRE: NORMAL
FEV1 %PRED-POST: 96 %
FEV1 %PRED-PRE: 84 %
FEV1 PRED: NORMAL
FEV1-POST: NORMAL
FEV1-PRE: NORMAL
FEV1/FVC %PRED-POST: 85 %
FEV1/FVC %PRED-PRE: 76 %
FEV1/FVC PRED: NORMAL
FEV1/FVC-POST: NORMAL
FEV1/FVC-PRE: NORMAL
FVC %PRED-POST: 111 L
FVC %PRED-PRE: 108 %
FVC PRED: NORMAL
FVC-POST: NORMAL
FVC-PRE: NORMAL
GAW %PRED: NORMAL
GAW PRED: NORMAL
GAW: NORMAL
IC %PRED: NORMAL
IC PRED: NORMAL
IC: NORMAL
MEP: NORMAL
MIP: NORMAL
MVV %PRED-PRE: NORMAL
MVV PRED: NORMAL
MVV-PRE: NORMAL
PEF %PRED-POST: NORMAL
PEF %PRED-PRE: NORMAL
PEF PRED: NORMAL
PEF%CHNG: NORMAL
PEF-POST: NORMAL
PEF-PRE: NORMAL
RAW %PRED: NORMAL
RAW PRED: NORMAL
RAW: NORMAL
RV %PRED: NORMAL
RV PRED: NORMAL
RV: NORMAL
SVC %PRED: NORMAL
SVC PRED: NORMAL
SVC: NORMAL
TLC %PRED: 99 %
TLC PRED: NORMAL
TLC: NORMAL
VA %PRED: NORMAL
VA PRED: NORMAL
VA: NORMAL
VTG %PRED: NORMAL
VTG PRED: NORMAL
VTG: NORMAL

## 2023-11-06 PROCEDURE — 94060 EVALUATION OF WHEEZING: CPT

## 2023-11-06 PROCEDURE — 6370000000 HC RX 637 (ALT 250 FOR IP): Performed by: INTERNAL MEDICINE

## 2023-11-06 PROCEDURE — 94729 DIFFUSING CAPACITY: CPT

## 2023-11-06 PROCEDURE — 94726 PLETHYSMOGRAPHY LUNG VOLUMES: CPT

## 2023-11-06 PROCEDURE — 94618 PULMONARY STRESS TESTING: CPT

## 2023-11-06 RX ORDER — POTASSIUM CHLORIDE 20 MEQ/1
TABLET, EXTENDED RELEASE ORAL
Qty: 40 TABLET | Refills: 1 | Status: SHIPPED | OUTPATIENT
Start: 2023-11-06

## 2023-11-06 RX ORDER — ALBUTEROL SULFATE 90 UG/1
4 AEROSOL, METERED RESPIRATORY (INHALATION) ONCE
Status: COMPLETED | OUTPATIENT
Start: 2023-11-06 | End: 2023-11-06

## 2023-11-06 RX ADMIN — Medication 4 PUFF: at 07:49

## 2023-11-06 ASSESSMENT — PULMONARY FUNCTION TESTS
FVC_PERCENT_PREDICTED_POST: 111
FVC_PERCENT_PREDICTED_PRE: 108
FEV1/FVC_PERCENT_PREDICTED_POST: 85
FEV1_PERCENT_PREDICTED_POST: 96
FEV1_PERCENT_PREDICTED_PRE: 84
FEV1/FVC_PERCENT_PREDICTED_PRE: 76

## 2023-11-06 NOTE — TELEPHONE ENCOUNTER
Change to KCL 20 mEq tablets (generic). Take one tablet daily and 2 tablets on days when she takes Torsemide.     Rx sent into pharmacy

## 2023-11-06 NOTE — TELEPHONE ENCOUNTER
Pt stated that she is not going to be able to afford the potassium chloride, she said that it is going to be $73 and would like to know what her alternative solutions are. Please advise.

## 2023-11-07 PROCEDURE — 94618 PULMONARY STRESS TESTING: CPT | Performed by: STUDENT IN AN ORGANIZED HEALTH CARE EDUCATION/TRAINING PROGRAM

## 2023-11-07 PROCEDURE — 94729 DIFFUSING CAPACITY: CPT | Performed by: STUDENT IN AN ORGANIZED HEALTH CARE EDUCATION/TRAINING PROGRAM

## 2023-11-07 PROCEDURE — 94726 PLETHYSMOGRAPHY LUNG VOLUMES: CPT | Performed by: STUDENT IN AN ORGANIZED HEALTH CARE EDUCATION/TRAINING PROGRAM

## 2023-11-07 PROCEDURE — 94060 EVALUATION OF WHEEZING: CPT | Performed by: STUDENT IN AN ORGANIZED HEALTH CARE EDUCATION/TRAINING PROGRAM

## 2023-11-07 NOTE — PROCEDURES
6-minute walk test    The patient walked without any assistance throughout the entire walk. At the time of the test, the patient reported a Promise scale of 0 while standing with an oxygen saturation of 95% on room air and heart rate of 66 bpm.  A 6-minute walk test was performed. The patient went for the entire 6-minute duration. The patient ambulated for a total distance of 1200 ft on RA. Her dyspnea at the end of the test was found to be 8 on the Promise scale at that time with an oxygen saturation of 94%. The patient's expected value was 18 ft for an average healthy female of her age group. Highest heart rate during the test was 145 bpm.  Lowest oxygenation saturation was 94% at 6 minutes. Impression: No desaturation throughout the entire 6-minute procedure. Patient achieved 104% of the expected distance for her age group.     Maribel Espinosa MD  [facility]

## 2023-11-07 NOTE — PROCEDURES
Pulmonary Function Testing      Patient name:  Jak Veliz     [facility] Unit #:   [id number]   Date of test:  11/6/2023   Date of interpretation:   11/7/2023    Ms. Jak Veliz is a 69y.o. year-old former smoker. The spirometry data were acceptable and reproducible. Spirometry:  Flow volume loops were obstructed. The FEV-1/FVC ratio was normal. The FEV-1 was normal. The FVC was normal. Response to inhaled bronchodilators (albuterol) was significant. Lung volumes:  Lung volumes were tested by plethysmography. The total lung capacity was normal. The residual volume was increased. The ratio of residual volume to total lung capacity (RV/TLC) was 124%, which was normal.     Diffusion capacity was found to be 86% which is Normal.      Interpretation:  Based on flow-volume loop and air trapping, mild obstructive defect with significant bronchodilator response and normal diffusion.     Comments: None      [facility]

## 2023-11-13 RX ORDER — SPIRONOLACTONE 25 MG/1
12.5 TABLET ORAL DAILY
Qty: 45 TABLET | Refills: 1 | OUTPATIENT
Start: 2023-11-13

## 2023-11-16 ENCOUNTER — OFFICE VISIT (OUTPATIENT)
Dept: PULMONOLOGY | Age: 78
End: 2023-11-16
Payer: MEDICARE

## 2023-11-16 VITALS
TEMPERATURE: 97.2 F | RESPIRATION RATE: 16 BRPM | HEIGHT: 66 IN | OXYGEN SATURATION: 96 % | WEIGHT: 214 LBS | SYSTOLIC BLOOD PRESSURE: 126 MMHG | HEART RATE: 52 BPM | DIASTOLIC BLOOD PRESSURE: 58 MMHG | BODY MASS INDEX: 34.39 KG/M2

## 2023-11-16 DIAGNOSIS — R53.83 EASY FATIGABILITY: ICD-10-CM

## 2023-11-16 DIAGNOSIS — G47.19 EXCESSIVE DAYTIME SLEEPINESS: ICD-10-CM

## 2023-11-16 DIAGNOSIS — R06.83 SNORING: Primary | ICD-10-CM

## 2023-11-16 PROCEDURE — 3074F SYST BP LT 130 MM HG: CPT | Performed by: INTERNAL MEDICINE

## 2023-11-16 PROCEDURE — 99214 OFFICE O/P EST MOD 30 MIN: CPT | Performed by: INTERNAL MEDICINE

## 2023-11-16 PROCEDURE — 3078F DIAST BP <80 MM HG: CPT | Performed by: INTERNAL MEDICINE

## 2023-11-16 PROCEDURE — 1123F ACP DISCUSS/DSCN MKR DOCD: CPT | Performed by: INTERNAL MEDICINE

## 2023-11-16 RX ORDER — TIOTROPIUM BROMIDE INHALATION SPRAY 1.56 UG/1
2 SPRAY, METERED RESPIRATORY (INHALATION) DAILY
Qty: 2 EACH | Refills: 0 | Status: SHIPPED | COMMUNITY
Start: 2023-11-16

## 2023-11-16 NOTE — PATIENT INSTRUCTIONS
Use albuterol as needed  We will give a sample of Spiriva Respimat to try and see if that makes a difference  Discontinue Dulera due to slight increased risk for pneumonia  Home sleep apnea test  Call pharmacy for RSV vaccine  Follow-up 1 week after the sleep study results    Health Maintenance/Preventive measures:        >>  Avoid exposure to tobacco, irritants, allergens as possible as well as contact with patients with infectious respiratory illness. >>  Stay up-to-date with influenza & pneumonia vaccines, RSV, & COVID-19 vaccine as recommended by the Advisory Committee on Immunization Practices (ACIP)        >>  Healthy diet and activity as able. >>  Acid reflux precautions: Head of bed elevation, avoiding tight clothes, avoiding big meals or snacking 3 hours before bedtime, targeting healthy weight.        >>  Practice sleep hygiene measures. Avoid driving or operating heavy machines if tired or sleepy. Remember to bring a list of pulmonary medications and any CPAP or BiPAP machines to your next appointment with the office. Please keep all of your future appointments scheduled by [facility] office. Out of respect for other patients and providers, you may be asked to reschedule your appointment if you arrive later than your scheduled appointment time. Appointments cancelled less than 24hrs in advance will be considered a no show. Patients with three missed appointments within 1 year or four missed appointments within 2 years can be dismissed from the practice. Please be aware that our physicians are required to work in the Intensive Care Unit at [facility].  Your appointment may need to be rescheduled if they are designated to work during your appointment time. You may receive a survey regarding the care you received during your visit. Your input is valuable to us. We encourage you to complete and return your survey.   We hope you will

## 2023-11-16 NOTE — PROGRESS NOTES
MA Communication:   The following orders are received by verbal communication from Lisa Nagel MD    Orders include: Home sleep study follow up after

## 2023-11-29 ENCOUNTER — HOSPITAL ENCOUNTER (OUTPATIENT)
Dept: WOMENS IMAGING | Age: 78
Discharge: HOME | End: 2023-11-29
Payer: MEDICARE

## 2023-11-29 VITALS — BODY MASS INDEX: 34.39 KG/M2 | HEIGHT: 66 IN | WEIGHT: 214 LBS

## 2023-11-29 DIAGNOSIS — Z12.31 ENCOUNTER FOR SCREENING MAMMOGRAM FOR MALIGNANT NEOPLASM OF BREAST: ICD-10-CM

## 2023-11-29 PROCEDURE — 77063 BREAST TOMOSYNTHESIS BI: CPT

## 2023-12-07 ENCOUNTER — HOSPITAL ENCOUNTER (OUTPATIENT)
Dept: SLEEP CENTER | Age: 78
Discharge: HOME | End: 2023-12-09
Payer: MEDICARE

## 2023-12-07 DIAGNOSIS — G47.19 EXCESSIVE DAYTIME SLEEPINESS: ICD-10-CM

## 2023-12-07 DIAGNOSIS — R53.83 EASY FATIGABILITY: ICD-10-CM

## 2023-12-07 PROCEDURE — 95806 SLEEP STUDY UNATT&RESP EFFT: CPT

## 2023-12-12 ENCOUNTER — TELEPHONE (OUTPATIENT)
Dept: PULMONOLOGY | Age: 78
End: 2023-12-12

## 2023-12-12 PROBLEM — R53.83 EASY FATIGABILITY: Status: ACTIVE | Noted: 2023-12-12

## 2023-12-12 NOTE — TELEPHONE ENCOUNTER
Patient did not show for OV  with MERARY on 12/12/23. Reason:  NA    This is patient's first no show. Patient was ano show on: NA.      Patient did not reschedule.   Reschedule date:  NA

## 2023-12-12 NOTE — PROGRESS NOTES
[facility]   Cardiac Consultation    Date: 12/14/23  Patient Name: Shala Garcia  YOB: 1945    Primary Care Physician: BASSAM Garcia NP    CHIEF COMPLAINT:   Chief Complaint   Patient presents with    Atrial Fibrillation    1 Year Follow Up     LOV SAHRA 10/2022         HPI:  Shala Garcia is a 69 y.o. female who presented for preop clearance for knee surgery to cardiology on 2/23/2022 and ECG demonstrated AF with HR of 68 bpm at that time. She was started on Eliquis. She underwent a cardiac cath on 3/4/2022 that demonstrated mild, nonobstructive CAD. Patient wore a cardiac event monitor from 2/23/2022 to 2/25/2022 which demonstrated persistent AF with an average HR of 73 (). The duration of the AF is unknown. She does not describe symptoms related to the AF. On 5/11/2022, she reports that she is doing ok. She is scheduled for right knee replacement for 6/1/2022. Patient underwent successful CV on 7/28/2022. On 11/16/2022, she reports that she is doing ok. She has now had both knees replaced. She does not feel like she is in AF. Her HR is controlled. 1 week cardiac monitor showed predominately AF with average HR of 80 bpm (), 0.77% PVC burden, and NSVT. In 10/2023 she was admitted with CHF. Today, 12/14/2023, she has been feeling OK. She reports she has been watching her sodium intake since her hospitalization in 10/2023. She reports think think she felt better after her CV 7/2022 while she was in [address] [place] [address]. Patient denies current edema, chest pain, sob, palpitations, dizziness or syncope. Patient is taking all cardiac medications as prescribed and tolerates them well.         Past Medical History:   has a past medical history of CHF (congestive heart failure), NYHA class I, acute on chronic, combined ([address]), Coronary artery disease, non-occlusive, DJD (degenerative joint disease), Hyperlipidemia, Hypertension, Hypothyroidism, Morbid obesity due to excess

## 2023-12-12 NOTE — PROGRESS NOTES
PULMONARY CLINIC NOTE      Tameka Cadena   : 1945  MRN: [id number]     Date of Service: 2024    PCP: Teresa Albarran APRN - NP    Referring provider: No ref. provider found      Chief Complaint   Patient presents with    Results     Patient is here to discuss sleep study results           ASSESSMENT & PLAN       78 y.o. pleasant  female patient with:    1. TAVO (obstructive sleep apnea)    2. Pulmonary emphysema, unspecified emphysema type (HCC)           #COPD with recurrent bronchitis, mild.  Admission for exacerbation in 2023  PFTs showed mild obstruction with air trapping and positive bronchodilator response.  Normal gas diffusion.  No oxygen needs in 2023  Use Spiriva, albuterol and a flutter valve for congestion.    Call us if things do not improve to consider chest x-ray, antibiotics or steroids if needed.    #Metabolic syndrome: Diabetes, hypertension, hyperlipidemia, body habitus.  S/p bypass surgery.    Body mass index is 35.19 kg/m².  Targeting healthy weight is encouraged. Healthy weight can help treat sleep apnea, decrease breathing difficulties and respiratory illness exacerbations.      # TAVO, mild with nocturnal hypoxia  Sleep disordered breathing with daytime fatigue  HSAT 2023: AHI 9.6 with lowest oxygen saturation 83% and hypoxia time 47 minutes  Discussed results of the sleep study with the patient.  Education provided on sleep apnea, contributing factors, benefits of treatment and risks of untreated sleep apnea.  Sleep hygiene, CPAP cleaning, desensitization, safety precautions education provided.  Auto CPAP ordered  Follow-up in  2 to 3 months     #HFpEF with exacerbation  #Elevated proBNP  #Mild to moderate aortic stenosis  #A-fib  #Nonobstructive coronary artery disease  #Right bundle branch block  Continue with diuresis and optimizing heart failure treatment as per cardiology and primary      #15 pack-year smoking history.  Quit in .  Does

## 2023-12-14 ENCOUNTER — TELEPHONE (OUTPATIENT)
Dept: CARDIOLOGY CLINIC | Age: 78
End: 2023-12-14

## 2023-12-14 ENCOUNTER — OFFICE VISIT (OUTPATIENT)
Dept: CARDIOLOGY CLINIC | Age: 78
End: 2023-12-14
Payer: MEDICARE

## 2023-12-14 VITALS
HEIGHT: 66 IN | BODY MASS INDEX: 35.36 KG/M2 | DIASTOLIC BLOOD PRESSURE: 50 MMHG | SYSTOLIC BLOOD PRESSURE: 100 MMHG | WEIGHT: 220 LBS | HEART RATE: 75 BPM | OXYGEN SATURATION: 95 %

## 2023-12-14 DIAGNOSIS — I48.19 PERSISTENT ATRIAL FIBRILLATION (HCC): Primary | ICD-10-CM

## 2023-12-14 PROCEDURE — 93000 ELECTROCARDIOGRAM COMPLETE: CPT | Performed by: INTERNAL MEDICINE

## 2023-12-14 PROCEDURE — 3074F SYST BP LT 130 MM HG: CPT | Performed by: INTERNAL MEDICINE

## 2023-12-14 PROCEDURE — 1123F ACP DISCUSS/DSCN MKR DOCD: CPT | Performed by: INTERNAL MEDICINE

## 2023-12-14 PROCEDURE — 99214 OFFICE O/P EST MOD 30 MIN: CPT | Performed by: INTERNAL MEDICINE

## 2023-12-14 PROCEDURE — 3078F DIAST BP <80 MM HG: CPT | Performed by: INTERNAL MEDICINE

## 2023-12-14 NOTE — TELEPHONE ENCOUNTER
Pt presented self in main suite. Dropped off PA renewal paperwork for Eliquis. Placed paperwork in [address] folder.

## 2023-12-14 NOTE — PATIENT INSTRUCTIONS
Plan:  Discussed alternative AF treatment options  -stay on current treatment course  -antiarrythmic medication, dofetilide (requires 3 day hospital stay) with possible repeat cardioversion  -catheter ablation  Patient would like to proceed with Dofetilide (Tikosyn) admission after the first of the year  Continue current cardiac medications  Follow up in 6 months with EP NP

## 2023-12-14 NOTE — TELEPHONE ENCOUNTER
Called and spoke with access center to set up future admission for 1/8/2024 for Tikosyn(Dofetilide) admission. Confirmed with access center. LVM for patient to inform her future admission has been scheduled for 1/8/2024 per our in office discussion. Please also re-iterate to her to not leave her house that morning until she has heard from access center regarding bed number, and if she has not heard from them by 8AM to please give our office a call. Reminder sent to NP's/MD's and hospital RN.

## 2024-01-04 ENCOUNTER — TELEPHONE (OUTPATIENT)
Dept: CARDIOLOGY CLINIC | Age: 79
End: 2024-01-04

## 2024-01-04 ENCOUNTER — OFFICE VISIT (OUTPATIENT)
Dept: PULMONOLOGY | Age: 79
End: 2024-01-04
Payer: MEDICARE

## 2024-01-04 VITALS
SYSTOLIC BLOOD PRESSURE: 109 MMHG | HEIGHT: 66 IN | OXYGEN SATURATION: 96 % | TEMPERATURE: 97.9 F | WEIGHT: 218 LBS | DIASTOLIC BLOOD PRESSURE: 57 MMHG | BODY MASS INDEX: 35.03 KG/M2 | HEART RATE: 88 BPM | RESPIRATION RATE: 16 BRPM

## 2024-01-04 DIAGNOSIS — J43.9 PULMONARY EMPHYSEMA, UNSPECIFIED EMPHYSEMA TYPE (HCC): ICD-10-CM

## 2024-01-04 DIAGNOSIS — G47.33 OSA (OBSTRUCTIVE SLEEP APNEA): Primary | ICD-10-CM

## 2024-01-04 PROCEDURE — 3074F SYST BP LT 130 MM HG: CPT | Performed by: INTERNAL MEDICINE

## 2024-01-04 PROCEDURE — 1123F ACP DISCUSS/DSCN MKR DOCD: CPT | Performed by: INTERNAL MEDICINE

## 2024-01-04 PROCEDURE — 3078F DIAST BP <80 MM HG: CPT | Performed by: INTERNAL MEDICINE

## 2024-01-04 PROCEDURE — 99214 OFFICE O/P EST MOD 30 MIN: CPT | Performed by: INTERNAL MEDICINE

## 2024-01-04 ASSESSMENT — SLEEP AND FATIGUE QUESTIONNAIRES
HOW LIKELY ARE YOU TO NOD OFF OR FALL ASLEEP WHILE SITTING QUIETLY AFTER LUNCH WITHOUT ALCOHOL: 0
HOW LIKELY ARE YOU TO NOD OFF OR FALL ASLEEP WHILE SITTING AND TALKING TO SOMEONE: 0
HOW LIKELY ARE YOU TO NOD OFF OR FALL ASLEEP WHILE WATCHING TV: 1
ESS TOTAL SCORE: 4
HOW LIKELY ARE YOU TO NOD OFF OR FALL ASLEEP IN A CAR, WHILE STOPPED FOR A FEW MINUTES IN TRAFFIC: 0
HOW LIKELY ARE YOU TO NOD OFF OR FALL ASLEEP WHILE SITTING INACTIVE IN A PUBLIC PLACE: 0
HOW LIKELY ARE YOU TO NOD OFF OR FALL ASLEEP WHILE LYING DOWN TO REST IN THE AFTERNOON WHEN CIRCUMSTANCES PERMIT: 1
HOW LIKELY ARE YOU TO NOD OFF OR FALL ASLEEP WHILE SITTING AND READING: 1
HOW LIKELY ARE YOU TO NOD OFF OR FALL ASLEEP WHEN YOU ARE A PASSENGER IN A CAR FOR AN HOUR WITHOUT A BREAK: 1

## 2024-01-04 NOTE — PATIENT INSTRUCTIONS
Discussed results of the sleep study with the patient.  Education provided on sleep apnea, contributing factors, benefits of treatment and risks of untreated sleep apnea.  Sleep hygiene, CPAP cleaning, desensitization, safety precautions education provided.  Auto CPAP ordered  Use Spiriva, albuterol and a flutter valve for congestion.  Call us if things do not improve to consider chest x-ray, antibiotics or steroids if needed.  Follow-up in  2 to 3 months      Health Maintenance/Preventive measures:        >>  Avoid exposure to tobacco, irritants, allergens as possible as well as contact with patients with infectious respiratory illness.        >>  Stay up-to-date with influenza & pneumonia vaccines, RSV, & COVID-19 vaccine as recommended by the Advisory Committee on Immunization Practices (ACIP)        >>  Healthy diet and activity as able.        >>  Acid reflux precautions: Head of bed elevation, avoiding tight clothes, avoiding big meals or snacking 3 hours before bedtime, targeting healthy weight.        >>  Practice sleep hygiene measures. Avoid driving or operating heavy machines if tired or sleepy.     Remember to bring a list of pulmonary medications and any CPAP or BiPAP machines to your next appointment with the office.     Please keep all of your future appointments scheduled by [facility] office. Out of respect for other patients and providers, you may be asked to reschedule your appointment if you arrive later than your scheduled appointment time. Appointments cancelled less than 24hrs in advance will be considered a no show. Patients with three missed appointments within 1 year or four missed appointments within 2 years can be dismissed from the practice.     Please be aware that our physicians are required to work in the Intensive Care Unit at [facility].  Your appointment may need to be rescheduled if they are designated to work during your appointment

## 2024-01-04 NOTE — PROGRESS NOTES
MA Communication:  The following orders are received by verbal communication from Dakota Carlisle MD    Orders include:    Patient given DME list  31-90 day scheduled

## 2024-01-08 NOTE — PROGRESS NOTES
rash, hives, or cyanosis  Musculoskeletal: Denies joint or muscle aches/pain  Neurological: Denies syncope or TIA-like symptoms.  Psychiatric: Denies anxiety, insomnia or depression       Past Medical History:   Diagnosis Date    CHF (congestive heart failure), NYHA class I, acute on chronic, combined ([facility]) 10/03/2023    Coronary artery disease, non-occlusive 2023    DJD (degenerative joint disease)     Hyperlipidemia     Hypertension     Hypothyroidism     Morbid obesity due to excess calories ([facility]) 2016    Persistent atrial fibrillation ([facility]) 2022    Type II or unspecified type diabetes mellitus without mention of complication, not stated as uncontrolled      Past Surgical History:   Procedure Laterality Date    CHOLECYSTECTOMY      COLONOSCOPY      DILATION AND CURETTAGE OF UTERUS      GASTRIC BYPASS SURGERY      HERNIA REPAIR      HYSTERECTOMY, TOTAL ABDOMINAL (CERVIX REMOVED)      KNEE SURGERY      OVARY REMOVAL      TUBAL LIGATION       Family History   Problem Relation Age of Onset    Breast Cancer Maternal Aunt      Social History     Tobacco Use    Smoking status: Former     Current packs/day: 0.00     Average packs/day: 1 pack/day for 15.0 years (15.0 ttl pk-yrs)     Types: Cigarettes     Start date: 1963     Quit date: 1978     Years since quittin.0    Smokeless tobacco: Never   Vaping Use    Vaping Use: Never used   Substance Use Topics    Alcohol use: Yes     Comment: occasionally    Drug use: No       No Known Allergies  Current Outpatient Medications   Medication Sig Dispense Refill    albuterol sulfate HFA (PROVENTIL;VENTOLIN;PROAIR) 108 (90 Base) MCG/ACT inhaler 1 puff as needed Inhalation every 4 hrs for 7 days      VICTOZA 18 MG/3ML SOPN SC injection       potassium chloride (KLOR-CON M20) 20 MEQ extended release tablet TAKE 1 TABLET BY MOUTH DAILY AND TAKE 2 TABLETS ON DAYS YOU TAKE TORSEMIDE 40 tablet 1    tiotropium (SPIRIVA RESPIMAT) 1.25 MCG/ACT AERS inhaler

## 2024-01-10 ENCOUNTER — TELEPHONE (OUTPATIENT)
Dept: PULMONOLOGY | Age: 79
End: 2024-01-10

## 2024-01-10 NOTE — TELEPHONE ENCOUNTER
Patient is wanting to use [employer] for her DME. The fax number is [phone]. Please send the order.

## 2024-01-11 ENCOUNTER — OFFICE VISIT (OUTPATIENT)
Dept: CARDIOLOGY CLINIC | Age: 79
End: 2024-01-11
Payer: MEDICARE

## 2024-01-11 VITALS
OXYGEN SATURATION: 95 % | HEART RATE: 78 BPM | BODY MASS INDEX: 34.55 KG/M2 | SYSTOLIC BLOOD PRESSURE: 116 MMHG | HEIGHT: 66 IN | WEIGHT: 215 LBS | DIASTOLIC BLOOD PRESSURE: 52 MMHG

## 2024-01-11 DIAGNOSIS — I25.10 CORONARY ARTERY DISEASE, NON-OCCLUSIVE: ICD-10-CM

## 2024-01-11 DIAGNOSIS — E78.2 MIXED HYPERLIPIDEMIA: ICD-10-CM

## 2024-01-11 DIAGNOSIS — I50.32 CHRONIC HEART FAILURE WITH PRESERVED EJECTION FRACTION (HFPEF) (HCC): Primary | ICD-10-CM

## 2024-01-11 DIAGNOSIS — I35.0 NONRHEUMATIC AORTIC VALVE STENOSIS: ICD-10-CM

## 2024-01-11 DIAGNOSIS — I10 ESSENTIAL HYPERTENSION: ICD-10-CM

## 2024-01-11 DIAGNOSIS — I48.19 PERSISTENT ATRIAL FIBRILLATION (HCC): ICD-10-CM

## 2024-01-11 PROCEDURE — 3078F DIAST BP <80 MM HG: CPT | Performed by: NURSE PRACTITIONER

## 2024-01-11 PROCEDURE — 3074F SYST BP LT 130 MM HG: CPT | Performed by: NURSE PRACTITIONER

## 2024-01-11 PROCEDURE — 1123F ACP DISCUSS/DSCN MKR DOCD: CPT | Performed by: NURSE PRACTITIONER

## 2024-01-11 PROCEDURE — 99214 OFFICE O/P EST MOD 30 MIN: CPT | Performed by: NURSE PRACTITIONER

## 2024-01-11 RX ORDER — LIRAGLUTIDE 6 MG/ML
INJECTION SUBCUTANEOUS
COMMUNITY
Start: 2023-12-06

## 2024-01-11 RX ORDER — ALBUTEROL SULFATE 90 UG/1
AEROSOL, METERED RESPIRATORY (INHALATION)
COMMUNITY
Start: 2023-09-23

## 2024-02-15 ENCOUNTER — TELEPHONE (OUTPATIENT)
Dept: PULMONOLOGY | Age: 79
End: 2024-02-15

## 2024-02-15 DIAGNOSIS — G47.33 OSA (OBSTRUCTIVE SLEEP APNEA): Primary | ICD-10-CM

## 2024-02-15 NOTE — TELEPHONE ENCOUNTER
Pt. Returned CPAP and [employer] needs MD to send DC order. Pt. Signed a against medical advise with [employer].

## 2024-02-15 NOTE — PROGRESS NOTES
Patient returned her CPAP device.  Will discontinue orders and discuss treatment options in her coming visit in a month.

## 2024-02-19 ENCOUNTER — TELEPHONE (OUTPATIENT)
Dept: CARDIOLOGY CLINIC | Age: 79
End: 2024-02-19

## 2024-02-19 NOTE — TELEPHONE ENCOUNTER
Spoke with pt, informed pt that blayne sheppard requesting documentation of out of pocket prescription expenses. Pt v/u.

## 2024-03-27 NOTE — PROGRESS NOTES
[facility]   Electrophysiology Outpatient Note              Date:  March 27, 2024  Patient name: Tameka Cadena  YOB: 1945    Primary Care physician: Teresa Albarran APRN - NP    HISTORY OF PRESENT ILLNESS: The patient is a 78 y.o.  female with a history of paroxysmal atrial fibrillation, RBBB, CAD, hyperlipidemia, hypertension, hypothyroidism, diabetes and gastric bypass surgery     In 2/2022 she presented to cardiology for preop clearance for knee surgery. EKG revealed atrial fibrillation. She was started on Eliquis. In 3/2022 she had LHC that revealed mild, nonobstructive CAD. Patient wore a cardiac event monitor from 2/23/2022 to 2/25/2022 which demonstrated persistent atrial fibrillation with an average HR of 73 (). In 7/2022 she had a right knee replacement. On 7/28/2022 she underwent DCCV with successful restoration of SR. In 11/2022 at , she was found to be in AF. 1 week cardiac monitor showed predominately AF with average HR of 80 bpm (), 0.77% PVC burden, and NSVT.    Patient was admitted to the hospital 10/3/2023 with complaints of shortness of breath and cough.  She was found to be in heart failure.  Patient was started on IV Lasix and Entresto, Jardiance and transition to Demadex at discharge    Today she is being seen for history of paroxysmal atrial fibrillation. EKG shows A-fib with a HR of 67. Patient states she has been feeling well.  She denies chest pain shortness of breath and palpitations.  She remains active on a daily basis.  She plans on starting her golf league with her girlfriends soon.  Had long discussion regarding her Tikosyn initiation that was planned with Dr Gross. She has decided to postpone this for now. She wants to wait since she is feeling good.    Past Medical History:   has a past medical history of CHF (congestive heart failure), NYHA class I, acute on chronic, combined (HCC), Coronary artery disease,

## 2024-03-28 ENCOUNTER — OFFICE VISIT (OUTPATIENT)
Dept: CARDIOLOGY CLINIC | Age: 79
End: 2024-03-28
Payer: MEDICARE

## 2024-03-28 VITALS
BODY MASS INDEX: 35.74 KG/M2 | HEIGHT: 66 IN | HEART RATE: 59 BPM | OXYGEN SATURATION: 95 % | DIASTOLIC BLOOD PRESSURE: 58 MMHG | WEIGHT: 222.4 LBS | SYSTOLIC BLOOD PRESSURE: 112 MMHG

## 2024-03-28 DIAGNOSIS — I48.19 PERSISTENT ATRIAL FIBRILLATION (HCC): Primary | ICD-10-CM

## 2024-03-28 PROCEDURE — 3078F DIAST BP <80 MM HG: CPT

## 2024-03-28 PROCEDURE — 3074F SYST BP LT 130 MM HG: CPT

## 2024-03-28 PROCEDURE — 99214 OFFICE O/P EST MOD 30 MIN: CPT

## 2024-03-28 PROCEDURE — 1123F ACP DISCUSS/DSCN MKR DOCD: CPT

## 2024-03-28 NOTE — PATIENT INSTRUCTIONS
1. Continue Entresto 24-25 mg twice daily  2.  Continue Jardiance 25 mg daily  3.  Continue Demadex 20 mg oral twice daily & KCl 20 mill equivalents twice daily  4.  Continue Norvasc 10 mg oral daily  5.  Continue aspirin 81 mg daily & Lipitor 20 mg nightly  6.  Continue Eliquis 5 mg twice daily for stroke risk reduction    Stay physically active

## 2024-04-12 LAB
ALBUMIN SERPL-MCNC: 4 G/DL
ALP BLD-CCNC: 109 U/L
ALT SERPL-CCNC: 11 U/L
ANION GAP SERPL CALCULATED.3IONS-SCNC: 1.3 MMOL/L
AST SERPL-CCNC: 22 U/L
BASOPHILS ABSOLUTE: 0 /ΜL
BASOPHILS RELATIVE PERCENT: 1 %
BILIRUB SERPL-MCNC: 0.8 MG/DL (ref 0.1–1.4)
BUN BLDV-MCNC: 24 MG/DL
CALCIUM SERPL-MCNC: 9.5 MG/DL
CHLORIDE BLD-SCNC: 100 MMOL/L
CHOLESTEROL, TOTAL: 160 MG/DL
CHOLESTEROL/HDL RATIO: ABNORMAL
CO2: 26 MMOL/L
CREAT SERPL-MCNC: 0.96 MG/DL
EGFR: 61
EOSINOPHILS ABSOLUTE: 0.1 /ΜL
EOSINOPHILS RELATIVE PERCENT: 1 %
ESTIMATED AVERAGE GLUCOSE: 171
GLUCOSE BLD-MCNC: 139 MG/DL
HBA1C MFR BLD: 7.6 %
HCT VFR BLD CALC: 40.7 % (ref 36–46)
HDLC SERPL-MCNC: 97 MG/DL (ref 35–70)
HEMOGLOBIN: 12.4 G/DL (ref 12–16)
LDL CHOLESTEROL CALCULATED: 50 MG/DL (ref 0–160)
LYMPHOCYTES ABSOLUTE: 1.2 /ΜL
LYMPHOCYTES RELATIVE PERCENT: 18 %
MCH RBC QN AUTO: 25.4 PG
MCHC RBC AUTO-ENTMCNC: 30.5 G/DL
MCV RBC AUTO: 83 FL
MONOCYTES ABSOLUTE: 0.5 /ΜL
MONOCYTES RELATIVE PERCENT: 8 %
NEUTROPHILS ABSOLUTE: 5 /ΜL
NEUTROPHILS RELATIVE PERCENT: 72 %
NONHDLC SERPL-MCNC: ABNORMAL MG/DL
PLATELET # BLD: 122 K/ΜL
PMV BLD AUTO: NORMAL FL
POTASSIUM SERPL-SCNC: 4.5 MMOL/L
RBC # BLD: 4.89 10^6/ΜL
SODIUM BLD-SCNC: NORMAL MMOL/L
TOTAL PROTEIN: 7
TRIGL SERPL-MCNC: 63 MG/DL
TSH SERPL DL<=0.05 MIU/L-ACNC: 0.08 UIU/ML
VLDLC SERPL CALC-MCNC: 13 MG/DL
WBC # BLD: 7 10^3/ML

## 2024-04-25 ENCOUNTER — TELEPHONE (OUTPATIENT)
Dept: CARDIOLOGY CLINIC | Age: 79
End: 2024-04-25

## 2024-04-25 RX ORDER — POTASSIUM CHLORIDE 20 MEQ/1
TABLET, EXTENDED RELEASE ORAL
Qty: 40 TABLET | Refills: 3 | Status: SHIPPED | OUTPATIENT
Start: 2024-04-25

## 2024-04-25 NOTE — TELEPHONE ENCOUNTER
Pt presented self in main suite to drop off PA PPW for Entresto.  Placed ppw in NPDE folder to be completed and faxed.

## 2024-06-25 NOTE — PROGRESS NOTES
torsemide (DEMADEX) 20 MG tablet Decrease to 20 mg tablet daily; take additional 20 mg tablet if weight gain > 3-5# over 5-7 days. 60 tablet 1    sacubitril-valsartan (ENTRESTO) 24-26 MG per tablet Take 1 tablet by mouth 2 times daily 60 tablet 1    apixaban (ELIQUIS) 5 MG TABS tablet Take 1 tablet by mouth 2 times daily 180 tablet 3    atorvastatin (LIPITOR) 20 MG tablet Take 1 tablet by mouth daily      FLUoxetine (PROZAC) 40 MG capsule Take 1 capsule by mouth as needed (anxiety) TAKE ONE CAPSULE BY MOUTH DAILY 30 capsule 2    metFORMIN (GLUCOPHAGE) 1000 MG tablet TAKE ONE TABLET BY MOUTH TWICE A DAY WITH MEALS 180 tablet 2    amLODIPine (NORVASC) 10 MG tablet TAKE ONE TABLET BY MOUTH DAILY (Patient taking differently: 0.5 tablets TAKE ONE TABLET BY MOUTH DAILY) 90 tablet 2    levothyroxine (SYNTHROID) 150 MCG tablet TAKE ONE TABLET BY MOUTH DAILY 90 tablet 3    ACCU-CHEK LIBBY PLUS strip USE TO TEST ONCE DAILY 50 strip 1    famotidine (PEPCID) 10 MG tablet Take 1 tablet by mouth 2 times daily      Cetirizine HCl (ZYRTEC PO) Take by mouth 2 times daily 2 BID      Insulin Pen Needle (PEN NEEDLES) 32G X 4 MM MISC USE ONCE DAILY FOR VICTOZA 100 each 2    Blood Glucose Monitoring Suppl (ACURA BLOOD GLUCOSE METER) W/DEVICE KIT Provide pt with glucometer under forumlary - ACCU-CHEK, Dx Type 2 DM, testing daily 1 kit 0    Lancets MISC 1 po daily, please provide insurance formulary brand - ACCU-CHEK, Dx Type 2 DM, testing daily 100 each 3    Vitamin D (CHOLECALCIFEROL) 1000 UNITS CAPS capsule Take 1 capsule by mouth daily      calcium-vitamin D (OSCAL-500) 500-200 MG-UNIT per tablet Take 2 tablets by mouth daily      aspirin 81 MG tablet Take 1 tablet by mouth daily       No current facility-administered medications for this visit.     Physical Exam:   /62   Pulse 78   Ht 1.676 m (5' 6\")   Wt 101.6 kg (224 lb)   SpO2 96%   BMI 36.15 kg/m²   Wt Readings from Last 2 Encounters:   06/27/24 101.6 kg (224 lb)

## 2024-06-27 ENCOUNTER — TELEPHONE (OUTPATIENT)
Dept: CARDIOLOGY CLINIC | Age: 79
End: 2024-06-27

## 2024-06-27 ENCOUNTER — OFFICE VISIT (OUTPATIENT)
Dept: CARDIOLOGY CLINIC | Age: 79
End: 2024-06-27
Payer: MEDICARE

## 2024-06-27 VITALS
SYSTOLIC BLOOD PRESSURE: 128 MMHG | BODY MASS INDEX: 36 KG/M2 | WEIGHT: 224 LBS | OXYGEN SATURATION: 96 % | HEART RATE: 78 BPM | HEIGHT: 66 IN | DIASTOLIC BLOOD PRESSURE: 62 MMHG

## 2024-06-27 DIAGNOSIS — I50.32 CHRONIC HEART FAILURE WITH PRESERVED EJECTION FRACTION (HFPEF) (HCC): Primary | ICD-10-CM

## 2024-06-27 DIAGNOSIS — E78.2 MIXED HYPERLIPIDEMIA: ICD-10-CM

## 2024-06-27 DIAGNOSIS — I35.0 NONRHEUMATIC AORTIC VALVE STENOSIS: ICD-10-CM

## 2024-06-27 DIAGNOSIS — G47.33 OSA (OBSTRUCTIVE SLEEP APNEA): ICD-10-CM

## 2024-06-27 DIAGNOSIS — I48.19 PERSISTENT ATRIAL FIBRILLATION (HCC): ICD-10-CM

## 2024-06-27 DIAGNOSIS — E11.9 TYPE 2 DIABETES MELLITUS WITHOUT COMPLICATION, WITHOUT LONG-TERM CURRENT USE OF INSULIN (HCC): ICD-10-CM

## 2024-06-27 DIAGNOSIS — I10 ESSENTIAL HYPERTENSION: ICD-10-CM

## 2024-06-27 DIAGNOSIS — I25.10 CORONARY ARTERY DISEASE, NON-OCCLUSIVE: ICD-10-CM

## 2024-06-27 PROCEDURE — 1123F ACP DISCUSS/DSCN MKR DOCD: CPT | Performed by: NURSE PRACTITIONER

## 2024-06-27 PROCEDURE — 99214 OFFICE O/P EST MOD 30 MIN: CPT | Performed by: NURSE PRACTITIONER

## 2024-06-27 PROCEDURE — 3074F SYST BP LT 130 MM HG: CPT | Performed by: NURSE PRACTITIONER

## 2024-06-27 PROCEDURE — 3078F DIAST BP <80 MM HG: CPT | Performed by: NURSE PRACTITIONER

## 2024-06-27 NOTE — PATIENT INSTRUCTIONS
Call [phone] ([phone]) to schedule  -call and schedule echo to be done (ultrasound of your heart): should be scheduled after November 4th.     Continue same medications

## 2024-08-05 RX ORDER — POTASSIUM CHLORIDE 20 MEQ/1
TABLET, EXTENDED RELEASE ORAL
Qty: 40 TABLET | Refills: 3 | Status: SHIPPED | OUTPATIENT
Start: 2024-08-05

## 2024-09-26 ENCOUNTER — OFFICE VISIT (OUTPATIENT)
Dept: CARDIOLOGY CLINIC | Age: 79
End: 2024-09-26
Payer: MEDICARE

## 2024-09-26 VITALS
HEIGHT: 66 IN | HEART RATE: 72 BPM | OXYGEN SATURATION: 98 % | SYSTOLIC BLOOD PRESSURE: 118 MMHG | DIASTOLIC BLOOD PRESSURE: 70 MMHG | WEIGHT: 225.8 LBS | BODY MASS INDEX: 36.29 KG/M2

## 2024-09-26 DIAGNOSIS — I48.19 PERSISTENT ATRIAL FIBRILLATION (HCC): Primary | ICD-10-CM

## 2024-09-26 PROCEDURE — 93000 ELECTROCARDIOGRAM COMPLETE: CPT

## 2024-09-26 PROCEDURE — 99214 OFFICE O/P EST MOD 30 MIN: CPT

## 2024-09-26 PROCEDURE — 3074F SYST BP LT 130 MM HG: CPT

## 2024-09-26 PROCEDURE — 3078F DIAST BP <80 MM HG: CPT

## 2024-09-26 PROCEDURE — 1123F ACP DISCUSS/DSCN MKR DOCD: CPT

## 2024-11-07 ENCOUNTER — HOSPITAL ENCOUNTER (OUTPATIENT)
Dept: CARDIOLOGY | Age: 79
Discharge: HOME | End: 2024-11-09
Payer: MEDICARE

## 2024-11-07 VITALS
DIASTOLIC BLOOD PRESSURE: 62 MMHG | HEIGHT: 66 IN | WEIGHT: 225 LBS | BODY MASS INDEX: 36.16 KG/M2 | SYSTOLIC BLOOD PRESSURE: 128 MMHG

## 2024-11-07 DIAGNOSIS — I35.0 NONRHEUMATIC AORTIC VALVE STENOSIS: ICD-10-CM

## 2024-11-07 LAB
ECHO AO ASC DIAM: 3.7 CM
ECHO AO ASCENDING AORTA INDEX: 1.76 CM/M2
ECHO AO ROOT DIAM: 3.1 CM
ECHO AO ROOT INDEX: 1.48 CM/M2
ECHO AV AREA PEAK VELOCITY: 1.3 CM2
ECHO AV AREA VTI: 1.3 CM2
ECHO AV AREA/BSA PEAK VELOCITY: 0.6 CM2/M2
ECHO AV AREA/BSA VTI: 0.6 CM2/M2
ECHO AV CUSP MM: 1.5 CM
ECHO AV MEAN GRADIENT: 12 MMHG
ECHO AV MEAN VELOCITY: 1.6 M/S
ECHO AV PEAK GRADIENT: 20 MMHG
ECHO AV PEAK VELOCITY: 2.3 M/S
ECHO AV VELOCITY RATIO: 0.35
ECHO AV VTI: 48 CM
ECHO BSA: 2.18 M2
ECHO EST RA PRESSURE: 8 MMHG
ECHO IVC PROX: 2.1 CM
ECHO LA AREA 2C: 26.6 CM2
ECHO LA AREA 4C: 28.7 CM2
ECHO LA DIAMETER INDEX: 2.14 CM/M2
ECHO LA DIAMETER: 4.5 CM
ECHO LA MAJOR AXIS: 6.8 CM
ECHO LA MINOR AXIS: 6.4 CM
ECHO LA TO AORTIC ROOT RATIO: 1.45
ECHO LA VOL BP: 95 ML (ref 22–52)
ECHO LA VOL MOD A2C: 88 ML (ref 22–52)
ECHO LA VOL MOD A4C: 98 ML (ref 22–52)
ECHO LA VOL/BSA BIPLANE: 45 ML/M2 (ref 16–34)
ECHO LA VOLUME INDEX MOD A2C: 42 ML/M2 (ref 16–34)
ECHO LA VOLUME INDEX MOD A4C: 47 ML/M2 (ref 16–34)
ECHO LV EDV 3D: 132 ML
ECHO LV EDV INDEX 3D: 63 ML/M2
ECHO LV EF PHYSICIAN: 55 %
ECHO LV EJECTION FRACTION 3D: 55 %
ECHO LV ESV 3D: 60 ML
ECHO LV ESV INDEX 3D: 29 ML/M2
ECHO LV FRACTIONAL SHORTENING: 36 % (ref 28–44)
ECHO LV GLOBAL LONGITUDINAL STRAIN (GLS): -11.1 %
ECHO LV GLOBAL LONGITUDINAL STRAIN (GLS): -15.5 %
ECHO LV GLOBAL LONGITUDINAL STRAIN (GLS): -16.1 %
ECHO LV GLOBAL LONGITUDINAL STRAIN (GLS): -19.2 %
ECHO LV INTERNAL DIMENSION DIASTOLE INDEX: 2.24 CM/M2
ECHO LV INTERNAL DIMENSION DIASTOLIC: 4.7 CM (ref 3.9–5.3)
ECHO LV INTERNAL DIMENSION SYSTOLIC INDEX: 1.43 CM/M2
ECHO LV INTERNAL DIMENSION SYSTOLIC: 3 CM
ECHO LV IVSD: 1.2 CM (ref 0.6–0.9)
ECHO LV MASS 2D: 199.6 G (ref 67–162)
ECHO LV MASS 3D INDEX: 59 G/M2
ECHO LV MASS 3D: 124 G
ECHO LV MASS INDEX 2D: 95 G/M2 (ref 43–95)
ECHO LV POSTERIOR WALL DIASTOLIC: 1.1 CM (ref 0.6–0.9)
ECHO LV RELATIVE WALL THICKNESS RATIO: 0.47
ECHO LVOT AREA: 3.8 CM2
ECHO LVOT AV VTI INDEX: 0.35
ECHO LVOT DIAM: 2.2 CM
ECHO LVOT MEAN GRADIENT: 1 MMHG
ECHO LVOT PEAK GRADIENT: 3 MMHG
ECHO LVOT PEAK VELOCITY: 0.8 M/S
ECHO LVOT STROKE VOLUME INDEX: 30.6 ML/M2
ECHO LVOT SV: 64.2 ML
ECHO LVOT VTI: 16.9 CM
ECHO RA AREA 4C: 23.6 CM2
ECHO RA END SYSTOLIC VOLUME APICAL 4 CHAMBER INDEX BSA: 35 ML/M2
ECHO RA VOLUME: 74 ML
ECHO RIGHT VENTRICULAR SYSTOLIC PRESSURE (RVSP): 41 MMHG
ECHO RV FREE WALL PEAK S': 17.5 CM/S
ECHO RV TAPSE: 1.8 CM (ref 1.7–?)
ECHO TV REGURGITANT MAX VELOCITY: 2.87 M/S
ECHO TV REGURGITANT PEAK GRADIENT: 33 MMHG

## 2024-11-07 PROCEDURE — 93325 DOPPLER ECHO COLOR FLOW MAPG: CPT

## 2024-11-19 ENCOUNTER — TELEPHONE (OUTPATIENT)
Dept: CARDIOLOGY CLINIC | Age: 79
End: 2024-11-19

## 2024-11-19 NOTE — TELEPHONE ENCOUNTER
[facility] sent a notification that pt needs to reapply for 2025 entresto. Spoke with pt, pt sts she will bring application to [facility] office for us to send in.

## 2024-12-04 NOTE — PROGRESS NOTES
CARDIOLOGY FOLLOW UP        Patient Name: Tameka Cadena  Primary Care physician: Teresa Albarran APRN - NP    Reason for Referral/Chief Complaint: Tameka Cadena is a 79 y.o. patient who presents to cardiology clinic today for evaluation and treatment of aortic stenosis and non obstructive CAD.     History of Present Illness:   Tameka Cadena 79 y.o. female with a medical history notable for hypertension, hyperlipidemia, Type 2 diabetes mellitus, persistent atrial fibrillation, non obstructive CAD (Cath 3/4/22),  RBBB, obstructive sleep apnea failed to tolerate CPAP, HFpEF, mild aortic stenosis and AV block.    Patient first evaluated 2/23/2022 and ECG demonstrated AF with HR of 68 bpm at that time. She was started on Eliquis. She underwent a cardiac cath on 3/4/2022 that demonstrated mild-moderate, nonobstructive CAD. Patient wore a cardiac event monitor from 2/23/2022 to 2/25/2022 which demonstrated persistent AF with an average HR of 73 (). subsequently underwent successful CV on 7/28/2022.  She was noted to have recurrence of atrial fibrillation in follow-up.  Persistent by repeat monitor.  Rate control strategy elected it per EP.    LOV 3/27/23 at which time noted stable.    In the interim she was hospitalized from 10/3/23-10/8/23 with exacerbation of her CHF. She had  a 6 minute walk test, PFT by pulmonary and now on CPAP after sleep evaluation. However could not tolerate CPAP and returned it to sleep center.  Follows EP for Atrial Fibrillation, noted declined afib ablation.  A limited echocardiogram  on 11/7/24 showed EF 55-60%, mild aortic valve stenosis, trace AR, trivial MR and mild TR.      Today she reports feeling well.  She is leaving for [facility] this weekend to babysit her daughter's 3 dogs while they go on a trip. Just got back from [place] last evening for great grandchild's [employer] performance.  She states every now and then she will feel \"electrical feeling\" in her

## 2024-12-05 ENCOUNTER — OFFICE VISIT (OUTPATIENT)
Dept: CARDIOLOGY CLINIC | Age: 79
End: 2024-12-05
Payer: MEDICARE

## 2024-12-05 VITALS
HEART RATE: 75 BPM | SYSTOLIC BLOOD PRESSURE: 130 MMHG | WEIGHT: 225 LBS | OXYGEN SATURATION: 98 % | HEIGHT: 66 IN | BODY MASS INDEX: 36.16 KG/M2 | DIASTOLIC BLOOD PRESSURE: 62 MMHG

## 2024-12-05 DIAGNOSIS — I48.19 PERSISTENT ATRIAL FIBRILLATION (HCC): ICD-10-CM

## 2024-12-05 DIAGNOSIS — I25.10 CORONARY ARTERY DISEASE, NON-OCCLUSIVE: ICD-10-CM

## 2024-12-05 DIAGNOSIS — I45.10 RBBB: ICD-10-CM

## 2024-12-05 DIAGNOSIS — I50.32 CHRONIC DIASTOLIC HEART FAILURE WITH PRESERVED EJECTION FRACTION (HCC): Primary | ICD-10-CM

## 2024-12-05 DIAGNOSIS — I35.0 NONRHEUMATIC AORTIC VALVE STENOSIS: ICD-10-CM

## 2024-12-05 PROCEDURE — 3078F DIAST BP <80 MM HG: CPT | Performed by: INTERNAL MEDICINE

## 2024-12-05 PROCEDURE — 99214 OFFICE O/P EST MOD 30 MIN: CPT | Performed by: INTERNAL MEDICINE

## 2024-12-05 PROCEDURE — 3075F SYST BP GE 130 - 139MM HG: CPT | Performed by: INTERNAL MEDICINE

## 2024-12-05 PROCEDURE — 1159F MED LIST DOCD IN RCRD: CPT | Performed by: INTERNAL MEDICINE

## 2024-12-05 PROCEDURE — 1123F ACP DISCUSS/DSCN MKR DOCD: CPT | Performed by: INTERNAL MEDICINE

## 2024-12-05 NOTE — PATIENT INSTRUCTIONS
Recommend taking the Torsemide once every morning for now.    Continue to weigh yourself daily.  If you gain 3 lbs over night or 5 lbs in 1 week you may take an extra torsemide.  Take this no later than 2 pm.    Strict Low salt/sodium diet  Continue all other medications as prescribed  No cardiac testing warranted at this time  Goal blood pressure is 140/90 or below

## 2024-12-23 NOTE — TELEPHONE ENCOUNTER
Attempted to contact pt, Per HIPAA form I was able to LMOVM relaying NPDE message   
Called [facility] and spoke to devante to request most recent labs be faxed to us.  
Labs abstracted and scanned into media. Please review   
Labs reviewed. No changes in meds. BMP, lipids look good.  
______________________________________ Electronically signed by: BREE DIAZ M.D. Date:     12/22/2024 Time:    07:58     XR CHEST PORTABLE    Result Date: 12/20/2024  1.  Extensive subcutaneous emphysema results in limited evaluation for pneumothorax.  Questionable pleural line and small apical pneumothorax adjacent to the left upper lobe chain suture material versus artifact from the suture.  Chest tube in unchanged position.    ______________________________________ Electronically signed by: STONE GALVEZ M.D. Date:     12/20/2024 Time:    15:36     XR CHEST PORTABLE    Result Date: 12/19/2024   1.  Stable position of the left chest tube. 2.  Small bilateral pneumothoraces, with stable extensive subcutaneous emphysema.    ______________________________________ Electronically signed by: ADAMARIS BOWER M.D. Date:     12/19/2024 Time:    07:02     XR CHEST PORTABLE    Result Date: 12/18/2024  1.  Stable extensive subcutaneous emphysema. 2.  Medial right pneumothorax. Possible pneumomediastinum. 3.  Stable position of left-sided chest tube.    ______________________________________ Electronically signed by: RAYMOND GALLARDO M.D. Date:     12/18/2024 Time:    07:16     XR CHEST PORTABLE    Result Date: 12/17/2024  No significant interval change with no visualized pneumothorax.    ______________________________________ Electronically signed by: TOMASZ MCCLOUD M.D. Date:     12/17/2024 Time:    06:13        [unfilled]    Discharge Medications    Current Discharge Medication List        Current Discharge Medication List        Current Discharge Medication List    CONTINUE these medications which have NOT CHANGED    donepezil (ARICEPT) 10 MG tablet  Take 1 tablet by mouth in the morning and at bedtime    memantine (NAMENDA) 10 MG tablet  Take 1 tablet by mouth 2 times daily    metoprolol succinate (TOPROL XL) 25 MG extended release tablet  Take 1 tablet by mouth 2 times daily    polyethylene glycol (GLYCOLAX) 17 g

## 2025-01-13 ENCOUNTER — TELEPHONE (OUTPATIENT)
Dept: CARDIOLOGY CLINIC | Age: 80
End: 2025-01-13

## 2025-01-13 NOTE — TELEPHONE ENCOUNTER
Pt presented herself in office and dropped off Patient Assistance ppw. Placed in RJM folder on 1.13.2025

## 2025-01-31 ENCOUNTER — HOSPITAL ENCOUNTER (OUTPATIENT)
Dept: WOMENS IMAGING | Age: 80
Discharge: HOME | End: 2025-01-31
Payer: MEDICARE

## 2025-01-31 VITALS — WEIGHT: 227 LBS | BODY MASS INDEX: 36.48 KG/M2 | HEIGHT: 66 IN

## 2025-01-31 DIAGNOSIS — Z12.31 VISIT FOR SCREENING MAMMOGRAM: ICD-10-CM

## 2025-01-31 PROCEDURE — 77063 BREAST TOMOSYNTHESIS BI: CPT

## 2025-02-21 ENCOUNTER — TELEPHONE (OUTPATIENT)
Dept: CARDIOLOGY CLINIC | Age: 80
End: 2025-02-21

## 2025-02-21 NOTE — TELEPHONE ENCOUNTER
Rosanne Pierson  Signed 1:41 PM     Copy     Pt stopped in main suite. Dropped off PA PPW for Entresto.  Placed ppw in NPDE folder to be completed and fax.

## 2025-03-09 ENCOUNTER — HOSPITAL ENCOUNTER (INPATIENT)
Age: 80
LOS: 2 days | Discharge: HOME | DRG: 291 | End: 2025-03-11
Attending: EMERGENCY MEDICINE | Admitting: INTERNAL MEDICINE
Payer: MEDICARE

## 2025-03-09 ENCOUNTER — APPOINTMENT (OUTPATIENT)
Dept: GENERAL RADIOLOGY | Age: 80
DRG: 291 | End: 2025-03-09
Payer: MEDICARE

## 2025-03-09 DIAGNOSIS — I16.0 HYPERTENSIVE URGENCY: ICD-10-CM

## 2025-03-09 DIAGNOSIS — J10.1 INFLUENZA A: Primary | ICD-10-CM

## 2025-03-09 DIAGNOSIS — R06.02 SHORTNESS OF BREATH: ICD-10-CM

## 2025-03-09 DIAGNOSIS — R09.02 HYPOXEMIA: ICD-10-CM

## 2025-03-09 DIAGNOSIS — J02.9 SORE THROAT: ICD-10-CM

## 2025-03-09 DIAGNOSIS — I50.9 ACUTE ON CHRONIC CONGESTIVE HEART FAILURE, UNSPECIFIED HEART FAILURE TYPE (HCC): ICD-10-CM

## 2025-03-09 DIAGNOSIS — R51.9 ACUTE NONINTRACTABLE HEADACHE, UNSPECIFIED HEADACHE TYPE: ICD-10-CM

## 2025-03-09 LAB
ALBUMIN SERPL-MCNC: 3.8 G/DL (ref 3.4–5)
ALBUMIN/GLOB SERPL: 1.1 {RATIO} (ref 1.1–2.2)
ALP SERPL-CCNC: 106 U/L (ref 40–129)
ALT SERPL-CCNC: 19 U/L (ref 10–40)
ANION GAP SERPL CALCULATED.3IONS-SCNC: 13 MMOL/L (ref 3–16)
AST SERPL-CCNC: 31 U/L (ref 15–37)
BASE EXCESS BLDV CALC-SCNC: 2.5 MMOL/L (ref -3–3)
BASOPHILS # BLD: 0 K/UL (ref 0–0.2)
BASOPHILS NFR BLD: 1 %
BILIRUB SERPL-MCNC: 0.5 MG/DL (ref 0–1)
BUN SERPL-MCNC: 14 MG/DL (ref 7–20)
CALCIUM SERPL-MCNC: 8.8 MG/DL (ref 8.3–10.6)
CHLORIDE SERPL-SCNC: 98 MMOL/L (ref 99–110)
CO2 BLDV-SCNC: 30 MMOL/L
CO2 SERPL-SCNC: 28 MMOL/L (ref 21–32)
COHGB MFR BLDV: 3.1 % (ref 0–1.5)
CREAT SERPL-MCNC: 0.9 MG/DL (ref 0.6–1.2)
DEPRECATED RDW RBC AUTO: 18.3 % (ref 12.4–15.4)
EOSINOPHIL # BLD: 0.1 K/UL (ref 0–0.6)
EOSINOPHIL NFR BLD: 1.3 %
FERRITIN SERPL IA-MCNC: 16.1 NG/ML (ref 15–150)
FLUAV RNA RESP QL NAA+PROBE: DETECTED
FLUBV RNA RESP QL NAA+PROBE: NOT DETECTED
GFR SERPLBLD CREATININE-BSD FMLA CKD-EPI: 65 ML/MIN/{1.73_M2}
GLUCOSE BLD-MCNC: 118 MG/DL (ref 70–99)
GLUCOSE BLD-MCNC: 218 MG/DL (ref 70–99)
GLUCOSE SERPL-MCNC: 149 MG/DL (ref 70–99)
HCO3 BLDV-SCNC: 28.1 MMOL/L (ref 23–29)
HCT VFR BLD AUTO: 36.7 % (ref 36–48)
HGB BLD-MCNC: 11.6 G/DL (ref 12–16)
IRON SATN MFR SERPL: 4 % (ref 15–50)
IRON SERPL-MCNC: 19 UG/DL (ref 37–145)
LACTATE BLDV-SCNC: 1.2 MMOL/L (ref 0.4–2)
LYMPHOCYTES # BLD: 0.5 K/UL (ref 1–5.1)
LYMPHOCYTES NFR BLD: 10.5 %
MAGNESIUM SERPL-MCNC: 1.84 MG/DL (ref 1.8–2.4)
MCH RBC QN AUTO: 23.9 PG (ref 26–34)
MCHC RBC AUTO-ENTMCNC: 31.7 G/DL (ref 31–36)
MCV RBC AUTO: 75.4 FL (ref 80–100)
METHGB MFR BLDV: 0.3 %
MONOCYTES # BLD: 0.7 K/UL (ref 0–1.3)
MONOCYTES NFR BLD: 14.3 %
NEUTROPHILS # BLD: 3.4 K/UL (ref 1.7–7.7)
NEUTROPHILS NFR BLD: 72.9 %
NT-PROBNP SERPL-MCNC: 1116 PG/ML (ref 0–449)
O2 THERAPY: ABNORMAL
PCO2 BLDV: 47.5 MMHG (ref 40–50)
PERFORMED ON: ABNORMAL
PERFORMED ON: ABNORMAL
PH BLDV: 7.39 [PH] (ref 7.35–7.45)
PLATELET # BLD AUTO: 134 K/UL (ref 135–450)
PMV BLD AUTO: 9.6 FL (ref 5–10.5)
PO2 BLDV: 34.4 MMHG (ref 25–40)
POTASSIUM SERPL-SCNC: 3.5 MMOL/L (ref 3.5–5.1)
PROCALCITONIN SERPL IA-MCNC: 0.04 NG/ML (ref 0–0.15)
PROT SERPL-MCNC: 7.2 G/DL (ref 6.4–8.2)
RBC # BLD AUTO: 4.87 M/UL (ref 4–5.2)
RSV AG NOSE QL: NEGATIVE
SAO2 % BLDV: 64 %
SARS-COV-2 RNA RESP QL NAA+PROBE: NOT DETECTED
SODIUM SERPL-SCNC: 139 MMOL/L (ref 136–145)
TIBC SERPL-MCNC: 434 UG/DL (ref 260–445)
TROPONIN, HIGH SENSITIVITY: 15 NG/L (ref 0–14)
TROPONIN, HIGH SENSITIVITY: 17 NG/L (ref 0–14)
WBC # BLD AUTO: 4.6 K/UL (ref 4–11)

## 2025-03-09 PROCEDURE — 93005 ELECTROCARDIOGRAM TRACING: CPT | Performed by: EMERGENCY MEDICINE

## 2025-03-09 PROCEDURE — 96366 THER/PROPH/DIAG IV INF ADDON: CPT

## 2025-03-09 PROCEDURE — 36415 COLL VENOUS BLD VENIPUNCTURE: CPT

## 2025-03-09 PROCEDURE — 99285 EMERGENCY DEPT VISIT HI MDM: CPT

## 2025-03-09 PROCEDURE — 2500000003 HC RX 250 WO HCPCS

## 2025-03-09 PROCEDURE — 96374 THER/PROPH/DIAG INJ IV PUSH: CPT

## 2025-03-09 PROCEDURE — 84145 PROCALCITONIN (PCT): CPT

## 2025-03-09 PROCEDURE — 83550 IRON BINDING TEST: CPT

## 2025-03-09 PROCEDURE — 83036 HEMOGLOBIN GLYCOSYLATED A1C: CPT

## 2025-03-09 PROCEDURE — 83540 ASSAY OF IRON: CPT

## 2025-03-09 PROCEDURE — 84484 ASSAY OF TROPONIN QUANT: CPT

## 2025-03-09 PROCEDURE — 87636 SARSCOV2 & INF A&B AMP PRB: CPT

## 2025-03-09 PROCEDURE — 96365 THER/PROPH/DIAG IV INF INIT: CPT

## 2025-03-09 PROCEDURE — 83605 ASSAY OF LACTIC ACID: CPT

## 2025-03-09 PROCEDURE — 85025 COMPLETE CBC W/AUTO DIFF WBC: CPT

## 2025-03-09 PROCEDURE — 6360000002 HC RX W HCPCS: Performed by: EMERGENCY MEDICINE

## 2025-03-09 PROCEDURE — 1200000000 HC SEMI PRIVATE

## 2025-03-09 PROCEDURE — 6370000000 HC RX 637 (ALT 250 FOR IP)

## 2025-03-09 PROCEDURE — 71045 X-RAY EXAM CHEST 1 VIEW: CPT

## 2025-03-09 PROCEDURE — 6370000000 HC RX 637 (ALT 250 FOR IP): Performed by: EMERGENCY MEDICINE

## 2025-03-09 PROCEDURE — 6360000002 HC RX W HCPCS

## 2025-03-09 PROCEDURE — 87807 RSV ASSAY W/OPTIC: CPT

## 2025-03-09 PROCEDURE — G0378 HOSPITAL OBSERVATION PER HR: HCPCS

## 2025-03-09 PROCEDURE — 82803 BLOOD GASES ANY COMBINATION: CPT

## 2025-03-09 PROCEDURE — 83880 ASSAY OF NATRIURETIC PEPTIDE: CPT

## 2025-03-09 PROCEDURE — 83735 ASSAY OF MAGNESIUM: CPT

## 2025-03-09 PROCEDURE — 96375 TX/PRO/DX INJ NEW DRUG ADDON: CPT

## 2025-03-09 PROCEDURE — 80053 COMPREHEN METABOLIC PANEL: CPT

## 2025-03-09 PROCEDURE — 82728 ASSAY OF FERRITIN: CPT

## 2025-03-09 RX ORDER — ATORVASTATIN CALCIUM 10 MG/1
20 TABLET, FILM COATED ORAL DAILY
Status: DISCONTINUED | OUTPATIENT
Start: 2025-03-09 | End: 2025-03-11 | Stop reason: HOSPADM

## 2025-03-09 RX ORDER — AMLODIPINE BESYLATE 5 MG/1
5 TABLET ORAL ONCE
Status: COMPLETED | OUTPATIENT
Start: 2025-03-09 | End: 2025-03-09

## 2025-03-09 RX ORDER — FUROSEMIDE 10 MG/ML
40 INJECTION INTRAMUSCULAR; INTRAVENOUS DAILY
Status: DISCONTINUED | OUTPATIENT
Start: 2025-03-10 | End: 2025-03-09

## 2025-03-09 RX ORDER — OSELTAMIVIR PHOSPHATE 75 MG/1
75 CAPSULE ORAL ONCE
Status: COMPLETED | OUTPATIENT
Start: 2025-03-09 | End: 2025-03-09

## 2025-03-09 RX ORDER — TORSEMIDE 20 MG/1
20 TABLET ORAL DAILY
Status: DISCONTINUED | OUTPATIENT
Start: 2025-03-09 | End: 2025-03-11 | Stop reason: HOSPADM

## 2025-03-09 RX ORDER — INSULIN LISPRO 100 [IU]/ML
0-4 INJECTION, SOLUTION INTRAVENOUS; SUBCUTANEOUS
Status: DISCONTINUED | OUTPATIENT
Start: 2025-03-09 | End: 2025-03-11 | Stop reason: HOSPADM

## 2025-03-09 RX ORDER — SODIUM CHLORIDE 0.9 % (FLUSH) 0.9 %
5-40 SYRINGE (ML) INJECTION PRN
Status: DISCONTINUED | OUTPATIENT
Start: 2025-03-09 | End: 2025-03-11 | Stop reason: HOSPADM

## 2025-03-09 RX ORDER — POLYETHYLENE GLYCOL 3350 17 G/17G
17 POWDER, FOR SOLUTION ORAL DAILY PRN
Status: DISCONTINUED | OUTPATIENT
Start: 2025-03-09 | End: 2025-03-11 | Stop reason: HOSPADM

## 2025-03-09 RX ORDER — SODIUM CHLORIDE 0.9 % (FLUSH) 0.9 %
5-40 SYRINGE (ML) INJECTION EVERY 12 HOURS SCHEDULED
Status: DISCONTINUED | OUTPATIENT
Start: 2025-03-09 | End: 2025-03-11 | Stop reason: HOSPADM

## 2025-03-09 RX ORDER — ACETAMINOPHEN 325 MG/1
650 TABLET ORAL EVERY 6 HOURS PRN
Status: DISCONTINUED | OUTPATIENT
Start: 2025-03-09 | End: 2025-03-11 | Stop reason: HOSPADM

## 2025-03-09 RX ORDER — ONDANSETRON 2 MG/ML
4 INJECTION INTRAMUSCULAR; INTRAVENOUS EVERY 6 HOURS PRN
Status: DISCONTINUED | OUTPATIENT
Start: 2025-03-09 | End: 2025-03-11 | Stop reason: HOSPADM

## 2025-03-09 RX ORDER — ONDANSETRON 4 MG/1
4 TABLET, ORALLY DISINTEGRATING ORAL EVERY 8 HOURS PRN
Status: DISCONTINUED | OUTPATIENT
Start: 2025-03-09 | End: 2025-03-11 | Stop reason: HOSPADM

## 2025-03-09 RX ORDER — LEVOTHYROXINE SODIUM 150 UG/1
150 TABLET ORAL DAILY
Status: DISCONTINUED | OUTPATIENT
Start: 2025-03-10 | End: 2025-03-09

## 2025-03-09 RX ORDER — OSELTAMIVIR PHOSPHATE 75 MG/1
75 CAPSULE ORAL 2 TIMES DAILY
Status: DISCONTINUED | OUTPATIENT
Start: 2025-03-09 | End: 2025-03-11 | Stop reason: HOSPADM

## 2025-03-09 RX ORDER — ACETAMINOPHEN 650 MG/1
650 SUPPOSITORY RECTAL EVERY 6 HOURS PRN
Status: DISCONTINUED | OUTPATIENT
Start: 2025-03-09 | End: 2025-03-11 | Stop reason: HOSPADM

## 2025-03-09 RX ORDER — MAGNESIUM SULFATE 1 G/100ML
1000 INJECTION INTRAVENOUS ONCE
Status: COMPLETED | OUTPATIENT
Start: 2025-03-09 | End: 2025-03-09

## 2025-03-09 RX ORDER — AMLODIPINE BESYLATE 5 MG/1
5 TABLET ORAL DAILY
Status: DISCONTINUED | OUTPATIENT
Start: 2025-03-10 | End: 2025-03-11 | Stop reason: HOSPADM

## 2025-03-09 RX ORDER — DEXTROSE MONOHYDRATE 100 MG/ML
INJECTION, SOLUTION INTRAVENOUS CONTINUOUS PRN
Status: DISCONTINUED | OUTPATIENT
Start: 2025-03-09 | End: 2025-03-11 | Stop reason: HOSPADM

## 2025-03-09 RX ORDER — GLUCAGON 1 MG/ML
1 KIT INJECTION PRN
Status: DISCONTINUED | OUTPATIENT
Start: 2025-03-09 | End: 2025-03-11 | Stop reason: HOSPADM

## 2025-03-09 RX ORDER — SODIUM CHLORIDE 9 MG/ML
INJECTION, SOLUTION INTRAVENOUS PRN
Status: DISCONTINUED | OUTPATIENT
Start: 2025-03-09 | End: 2025-03-11 | Stop reason: HOSPADM

## 2025-03-09 RX ORDER — ASPIRIN 81 MG/1
81 TABLET, CHEWABLE ORAL DAILY
Status: DISCONTINUED | OUTPATIENT
Start: 2025-03-09 | End: 2025-03-11 | Stop reason: HOSPADM

## 2025-03-09 RX ORDER — POTASSIUM CHLORIDE 1500 MG/1
40 TABLET, EXTENDED RELEASE ORAL ONCE
Status: COMPLETED | OUTPATIENT
Start: 2025-03-09 | End: 2025-03-09

## 2025-03-09 RX ORDER — FUROSEMIDE 10 MG/ML
40 INJECTION INTRAMUSCULAR; INTRAVENOUS ONCE
Status: COMPLETED | OUTPATIENT
Start: 2025-03-09 | End: 2025-03-09

## 2025-03-09 RX ADMIN — AMLODIPINE BESYLATE 5 MG: 5 TABLET ORAL at 08:31

## 2025-03-09 RX ADMIN — POTASSIUM CHLORIDE 40 MEQ: 1500 TABLET, EXTENDED RELEASE ORAL at 09:49

## 2025-03-09 RX ADMIN — APIXABAN 5 MG: 5 TABLET, FILM COATED ORAL at 14:23

## 2025-03-09 RX ADMIN — ATORVASTATIN CALCIUM 20 MG: 10 TABLET, FILM COATED ORAL at 14:23

## 2025-03-09 RX ADMIN — FUROSEMIDE 40 MG: 10 INJECTION, SOLUTION INTRAMUSCULAR; INTRAVENOUS at 08:34

## 2025-03-09 RX ADMIN — SACUBITRIL AND VALSARTAN 1 TABLET: 24; 26 TABLET, FILM COATED ORAL at 14:22

## 2025-03-09 RX ADMIN — SACUBITRIL AND VALSARTAN 1 TABLET: 24; 26 TABLET, FILM COATED ORAL at 08:31

## 2025-03-09 RX ADMIN — ASPIRIN 81 MG: 81 TABLET, CHEWABLE ORAL at 14:23

## 2025-03-09 RX ADMIN — MAGNESIUM SULFATE HEPTAHYDRATE 1000 MG: 1 INJECTION, SOLUTION INTRAVENOUS at 09:51

## 2025-03-09 RX ADMIN — ACETAMINOPHEN 650 MG: 325 TABLET ORAL at 14:23

## 2025-03-09 RX ADMIN — OSELTAMIVIR 75 MG: 75 CAPSULE ORAL at 20:52

## 2025-03-09 RX ADMIN — SACUBITRIL AND VALSARTAN 1 TABLET: 24; 26 TABLET, FILM COATED ORAL at 20:52

## 2025-03-09 RX ADMIN — APIXABAN 5 MG: 5 TABLET, FILM COATED ORAL at 20:52

## 2025-03-09 RX ADMIN — INSULIN LISPRO 1 UNITS: 100 INJECTION, SOLUTION INTRAVENOUS; SUBCUTANEOUS at 20:50

## 2025-03-09 RX ADMIN — SODIUM CHLORIDE, PRESERVATIVE FREE 10 ML: 5 INJECTION INTRAVENOUS at 20:52

## 2025-03-09 RX ADMIN — OSELTAMIVIR PHOSPHATE 75 MG: 75 CAPSULE ORAL at 08:30

## 2025-03-09 RX ADMIN — NITROGLYCERIN 0.5 INCH: 20 OINTMENT TOPICAL at 08:33

## 2025-03-09 ASSESSMENT — PAIN DESCRIPTION - ORIENTATION: ORIENTATION: ANTERIOR

## 2025-03-09 ASSESSMENT — PAIN DESCRIPTION - DESCRIPTORS: DESCRIPTORS: ACHING

## 2025-03-09 ASSESSMENT — PAIN SCALES - GENERAL
PAINLEVEL_OUTOF10: 7
PAINLEVEL_OUTOF10: 0

## 2025-03-09 ASSESSMENT — PAIN DESCRIPTION - LOCATION: LOCATION: HEAD

## 2025-03-09 ASSESSMENT — PAIN - FUNCTIONAL ASSESSMENT: PAIN_FUNCTIONAL_ASSESSMENT: 0-10

## 2025-03-09 NOTE — ED NOTES
Ambulated Patient with pulse oximeter. Pt stayed between 91-94% on room air. Pt stated she felt short of breath during ambulation but otherwise no other complaints. Patient tolerated well.

## 2025-03-09 NOTE — ED NOTES
Tameka Cadena is a 79 y.o. female admitted for  Principal Problem:    Acute exacerbation of chronic heart failure (HCC)  Resolved Problems:    * No resolved hospital problems. *  .   Patient Home via family with   Chief Complaint   Patient presents with    Cough     All Sx started Thursday and has been getting worse    Fever    Fatigue    Shortness of Breath    Chills   .  Patient is alert and Person, Place, Time, and Situation  Patient's baseline mobility: Baseline Mobility: Independent   Code Status: Prior   Cardiac Rhythm:       Is patient on baseline Oxygen: no how many Liters:   Abnormal Assessment Findings: Flu A positive.     Isolation: Droplet      NIH Score:    C-SSRS: Risk of Suicide: No Risk  Bedside swallow:        Active LDA's:   Peripheral IV 03/09/25 Right Antecubital (Active)     Patient admitted with a vázquez: no If the vázquez is chronic was it exchanged:  Reason for vázquez:   Patient admitted with Central Line:  NA . PICC line placement confirmed: YES OR NO:[id number]}   Reason for Central line:   Was central line Inserted from an outside facility:        Family/Caregiver Present yes Any Concerns: no   Restraints no  Sitter no         Vitals:      Vitals:    03/09/25 0831 03/09/25 1103 03/09/25 1133 03/09/25 1204   BP: 125/64 111/65 121/72 115/65   Pulse:  78 86 81   Resp:  17 26 21   Temp:       TempSrc:       SpO2:  94% 94% 96%   Weight:       Height:           Last documented pain score (0-10 scale) Pain Level: 0  Pain medication administered No.    Pertinent or High Risk Medications/Drips: Yes- see MAR.    Pending Blood Product Administration: no    Abnormal labs:   Abnormal Labs Reviewed   COVID-19 & INFLUENZA COMBO - Abnormal; Notable for the following components:       Result Value    Influenza A DETECTED (*)     All other components within normal limits   CBC WITH AUTO DIFFERENTIAL - Abnormal; Notable for the following components:    Hemoglobin 11.6 (*)     MCV 75.4 (*)     MCH 23.9 (*)     RDW

## 2025-03-09 NOTE — PLAN OF CARE
PLAN OF CARE    Received request for inpatient admission. Admitting provider Connie Monge NP notified.    NESS TEE MD  3/9/2025  8:12 AM

## 2025-03-09 NOTE — ED PROVIDER NOTES
EMERGENCY DEPARTMENT ENCOUNTER        Pt Name: Tameka Cadena  MRN: [id number]  Birthdate 1945  Date of evaluation: 3/9/2025  Provider: Jaciel Corral MD  PCP: Teresa Albarran APRN - NP      CHIEF COMPLAINT       Chief Complaint   Patient presents with    Cough     All Sx started Thursday and has been getting worse    Fever    Fatigue    Shortness of Breath    Chills       HISTORY OFPRESENT ILLNESS   (Location/Symptom, Timing/Onset, Context/Setting, Quality, Duration, Modifying Factors,Severity)  Note limiting factors.     Tameka Cadena is a 79 y.o. female presenting today due to concern for not feeling well starting on Thursday evening associated with a scratchy throat that has progressed to a cough with fever and chills along with feeling short of breath and fatigue.  She reports trying to cough stuff up but cannot expectorate it.  She is on Eliquis and denies missing any doses.  She denies any history of blood clots.  She denies any leg swelling.  No urinary complaints.  No abdominal pain or vomiting or diarrhea.  No falls or trauma.  No syncope.  She does have a mild headache.  She denies any chest pain.  She has a history of congestive heart failure.  She had a temperature as high as 100.2 °F at home that she checked although she does describe subjective fever and chills as well.  No reported stroke-like symptoms such as unilateral numbness or weakness although she does have some generalized weakness.  No one else is sick at home although she does live by herself.  She does not normally wear oxygen.  Due to worsening shortness of breath, she was brought to the emergency department for further evaluation.                Review of Systems:     Positives and Pertinent negatives as per HPI.      PASTMEDICAL HISTORY     Past Medical History:   Diagnosis Date    CHF (congestive heart failure), NYHA class I, acute on chronic, combined ([facility]) 10/03/2023    Coronary artery disease, non-occlusive

## 2025-03-09 NOTE — PROGRESS NOTES
4 Eyes Skin Assessment and Patient belongings     The patient is being assess for  Admission    I agree that 2 Nurses have performed a thorough Head to Toe Skin Assessment on the patient. ALL assessment sites listed below have been assessed.       Areas assessed by both nurses: Dedra WEAVER & Radha RN   [x]   Head, Face, and Ears   [x]   Shoulders, Back, and Chest  [x]   Arms, Elbows, and Hands   [x]   Coccyx, Sacrum, and IschIum  [x]   Legs, Feet, and Heels        Does the Patient have Skin Breakdown?  No         Fercho Prevention initiated:  No   Wound Care Orders initiated:  No      [facility] nurse consulted for Pressure Injury (Stage 3,4, Unstageable, DTI, NWPT, and Complex wounds), New and Established Ostomies:  No      I agree that 2 Nurses have reviewed patient belongings with the patient/family and documented in the flowsheet upon admission or transfer to the unit.     Belongings  Dental Appliances: None  Vision - Corrective Lenses: Eyeglasses, At bedside  Hearing Aid: None  Clothing: Footwear, Shirt, Pants, Jacket/Coat, At bedside  Jewelry: Necklace  Body Piercings Removed: N/A  Electronic Devices: Cell Phone,   Weapons (Notify Protective Services/Security): None  Other Valuables: Purse, Sent home  Home Medications: None  Valuables Given To: Family (Comment)  Provide Name(s) of Who Valuable(s) Were Given To: Tyrone       Nurse 1 eSignature: Electronically signed by Dedra Larose RN on 3/9/25 at 3:47 PM EDT    **SHARE this note so that the co-signing nurse is able to place an eSignature**    Nurse 2 eSignature: {Esignature:[id number]}

## 2025-03-09 NOTE — ED NOTES
Patient resting in bed with call light in reach. Son at bedside. IV infusing and updated on plan of care.

## 2025-03-09 NOTE — H&P
Hospital Medicine History & Physical    V 1.6    Date of Admission: 3/9/2025    Date of Service:  Pt seen/examined on 3/9/25     [x]Admitted to Inpatient with expected LOS greater than two midnights due to medical therapy.  []Placed in Observation status.    Chief Admission Complaint:  chest tightness, productive cough    Presenting Admission History: 79 y.o. female with past medical history significant for atrial fibrillation, diabetes, hypertension, hyperlipidemia, hypothyroidism, obesity, heart failure, former smoker.  Presented to [facility] with chest tightness and productive cough.  This was associated with dyspnea, headache, and dizziness.  Denies fever, n/v, palpitations, edema, weight gain.  There are no aggravating or alleviating factors.  No radiation of symptoms.      Assessment/Plan:      Current Principal Problem:  Acute exacerbation of chronic heart failure (HCC)    Acute on chronic HFpEF.  Echo 11/2024: EF 55-60%; normal wall motion; normal diastolic function.  Daily weights.  Strict I&O.  Received IV lasix in ED.  Continue home dose torsemide, entresto on admission.  BNP was 1,116 on arrival.  Follows with [facility] as outpatient.  CHF orderset is in place    Flu A.  Positive PCR on 3/9.  Isolation precautions and duration per hospital policy.  Continue tamiflu    Diabetes type II.  No recent A1C found on chart review, one was ordered.  Hold home regimen while admitted, anticipate resuming on discharge.  Fingerstick blood glucose will be monitored.  Continue low-dose sliding scale insulin.  Hypoglycemia protocol in place.      Essential hypertension.  Continue entresto, amlodipine.  Monitor    Hyperlipidemia, controlled.  LDL noted to be 97 in April 2024.  Continue statin    Hypothyroidism.  No recent TSH found on chart review, one was ordered.  Continued levothyroxine    Atrial fibrillation, persistent.  Rate controlled.  Continue apixaban.  Not on beta-blocker or

## 2025-03-09 NOTE — PROGRESS NOTES
Assessment completed and documented. VSS. A/ox4. Denies pain. RA at 92%. Ambulates stand by but states she uses cane at home for long distance. Hat in commode for strict I&O. Admission 4 eye complete. Pt oriented to room. Bed locked and in lowest position. Bedside table and call light within reach. Denies further needs at this time.

## 2025-03-10 LAB
ALBUMIN SERPL-MCNC: 3.2 G/DL (ref 3.4–5)
ALP SERPL-CCNC: 90 U/L (ref 40–129)
ALT SERPL-CCNC: 12 U/L (ref 10–40)
ANION GAP SERPL CALCULATED.3IONS-SCNC: 12 MMOL/L (ref 3–16)
AST SERPL-CCNC: 28 U/L (ref 15–37)
BILIRUB DIRECT SERPL-MCNC: 0.2 MG/DL (ref 0–0.3)
BILIRUB INDIRECT SERPL-MCNC: 0.2 MG/DL (ref 0–1)
BILIRUB SERPL-MCNC: 0.4 MG/DL (ref 0–1)
BUN SERPL-MCNC: 15 MG/DL (ref 7–20)
CALCIUM SERPL-MCNC: 8.5 MG/DL (ref 8.3–10.6)
CHLORIDE SERPL-SCNC: 100 MMOL/L (ref 99–110)
CO2 SERPL-SCNC: 25 MMOL/L (ref 21–32)
CREAT SERPL-MCNC: 0.7 MG/DL (ref 0.6–1.2)
EKG DIAGNOSIS: NORMAL
EKG Q-T INTERVAL: 382 MS
EKG QRS DURATION: 122 MS
EKG QTC CALCULATION (BAZETT): 457 MS
EKG R AXIS: -69 DEGREES
EKG T AXIS: 63 DEGREES
EKG VENTRICULAR RATE: 86 BPM
EST. AVERAGE GLUCOSE BLD GHB EST-MCNC: 174.3 MG/DL
GFR SERPLBLD CREATININE-BSD FMLA CKD-EPI: 88 ML/MIN/{1.73_M2}
GLUCOSE BLD-MCNC: 132 MG/DL (ref 70–99)
GLUCOSE BLD-MCNC: 161 MG/DL (ref 70–99)
GLUCOSE BLD-MCNC: 186 MG/DL (ref 70–99)
GLUCOSE BLD-MCNC: 198 MG/DL (ref 70–99)
GLUCOSE SERPL-MCNC: 139 MG/DL (ref 70–99)
HBA1C MFR BLD: 7.7 %
MAGNESIUM SERPL-MCNC: 2.21 MG/DL (ref 1.8–2.4)
PERFORMED ON: ABNORMAL
POTASSIUM SERPL-SCNC: 3.4 MMOL/L (ref 3.5–5.1)
PROT SERPL-MCNC: 6.3 G/DL (ref 6.4–8.2)
SODIUM SERPL-SCNC: 137 MMOL/L (ref 136–145)

## 2025-03-10 PROCEDURE — 93010 ELECTROCARDIOGRAM REPORT: CPT | Performed by: INTERNAL MEDICINE

## 2025-03-10 PROCEDURE — 2500000003 HC RX 250 WO HCPCS

## 2025-03-10 PROCEDURE — 84443 ASSAY THYROID STIM HORMONE: CPT

## 2025-03-10 PROCEDURE — 83735 ASSAY OF MAGNESIUM: CPT

## 2025-03-10 PROCEDURE — 80048 BASIC METABOLIC PNL TOTAL CA: CPT

## 2025-03-10 PROCEDURE — 6370000000 HC RX 637 (ALT 250 FOR IP)

## 2025-03-10 PROCEDURE — 1200000000 HC SEMI PRIVATE

## 2025-03-10 PROCEDURE — G0378 HOSPITAL OBSERVATION PER HR: HCPCS

## 2025-03-10 PROCEDURE — 80061 LIPID PANEL: CPT

## 2025-03-10 PROCEDURE — 6370000000 HC RX 637 (ALT 250 FOR IP): Performed by: NURSE PRACTITIONER

## 2025-03-10 PROCEDURE — 80076 HEPATIC FUNCTION PANEL: CPT

## 2025-03-10 PROCEDURE — 36415 COLL VENOUS BLD VENIPUNCTURE: CPT

## 2025-03-10 RX ORDER — POTASSIUM CHLORIDE 1500 MG/1
20 TABLET, EXTENDED RELEASE ORAL ONCE
Status: COMPLETED | OUTPATIENT
Start: 2025-03-10 | End: 2025-03-10

## 2025-03-10 RX ADMIN — ATORVASTATIN CALCIUM 20 MG: 10 TABLET, FILM COATED ORAL at 10:07

## 2025-03-10 RX ADMIN — SACUBITRIL AND VALSARTAN 1 TABLET: 24; 26 TABLET, FILM COATED ORAL at 10:06

## 2025-03-10 RX ADMIN — OSELTAMIVIR 75 MG: 75 CAPSULE ORAL at 20:42

## 2025-03-10 RX ADMIN — POTASSIUM CHLORIDE 20 MEQ: 1500 TABLET, EXTENDED RELEASE ORAL at 12:37

## 2025-03-10 RX ADMIN — INSULIN LISPRO 1 UNITS: 100 INJECTION, SOLUTION INTRAVENOUS; SUBCUTANEOUS at 11:36

## 2025-03-10 RX ADMIN — LEVOTHYROXINE SODIUM 137 MCG: 0.03 TABLET ORAL at 05:13

## 2025-03-10 RX ADMIN — INSULIN LISPRO 1 UNITS: 100 INJECTION, SOLUTION INTRAVENOUS; SUBCUTANEOUS at 20:49

## 2025-03-10 RX ADMIN — SODIUM CHLORIDE, PRESERVATIVE FREE 10 ML: 5 INJECTION INTRAVENOUS at 20:43

## 2025-03-10 RX ADMIN — APIXABAN 5 MG: 5 TABLET, FILM COATED ORAL at 20:42

## 2025-03-10 RX ADMIN — TORSEMIDE 20 MG: 20 TABLET ORAL at 10:06

## 2025-03-10 RX ADMIN — OSELTAMIVIR 75 MG: 75 CAPSULE ORAL at 10:07

## 2025-03-10 RX ADMIN — APIXABAN 5 MG: 5 TABLET, FILM COATED ORAL at 10:06

## 2025-03-10 RX ADMIN — Medication 1 LOZENGE: at 05:13

## 2025-03-10 RX ADMIN — AMLODIPINE BESYLATE 5 MG: 5 TABLET ORAL at 10:08

## 2025-03-10 RX ADMIN — SODIUM CHLORIDE, PRESERVATIVE FREE 10 ML: 5 INJECTION INTRAVENOUS at 10:12

## 2025-03-10 RX ADMIN — ASPIRIN 81 MG: 81 TABLET, CHEWABLE ORAL at 10:06

## 2025-03-10 RX ADMIN — SACUBITRIL AND VALSARTAN 1 TABLET: 24; 26 TABLET, FILM COATED ORAL at 20:42

## 2025-03-10 NOTE — PLAN OF CARE
Problem: Chronic Conditions and Co-morbidities  Goal: Patient's chronic conditions and co-morbidity symptoms are monitored and maintained or improved  Outcome: Progressing  Flowsheets (Taken 3/9/2025 1343 by Dedra Larose RN)  Care Plan - Patient's Chronic Conditions and Co-Morbidity Symptoms are Monitored and Maintained or Improved:   Monitor and assess patient's chronic conditions and comorbid symptoms for stability, deterioration, or improvement   Collaborate with multidisciplinary team to address chronic and comorbid conditions and prevent exacerbation or deterioration   Update acute care plan with appropriate goals if chronic or comorbid symptoms are exacerbated and prevent overall improvement and discharge     Problem: Pain  Goal: Verbalizes/displays adequate comfort level or baseline comfort level  Outcome: Progressing  Flowsheets (Taken 3/9/2025 2207 by Delaeny Sullivan, RN)  Verbalizes/displays adequate comfort level or baseline comfort level:   Encourage patient to monitor pain and request assistance   Assess pain using appropriate pain scale   Administer analgesics based on type and severity of pain and evaluate response   Consider cultural and social influences on pain and pain management   Implement non-pharmacological measures as appropriate and evaluate response   Notify Licensed Independent Practitioner if interventions unsuccessful or patient reports new pain     Problem: Safety - Adult  Goal: Free from fall injury  Outcome: Progressing  Flowsheets (Taken 3/9/2025 2207 by Delaney Sullivan, RN)  Free From Fall Injury:   Based on caregiver fall risk screen, instruct family/caregiver to ask for assistance with transferring infant if caregiver noted to have fall risk factors   Instruct family/caregiver on patient safety     Problem: ABCDS Injury Assessment  Goal: Absence of physical injury  Outcome: Progressing  Flowsheets (Taken 3/9/2025 2207 by Delaney Sullivan, RN)  Absence of Physical Injury: Implement safety

## 2025-03-10 NOTE — DISCHARGE INSTRUCTIONS
Heart Failure Resources:  Heart Failure Interactive Workbook:  Go to https://[employer]/publication/?f=146741 for a Free Heart Failure Interactive Workbook provided by The American Heart Association. This interactive workbook will provide information on Healthier Living with Heart Failure. Please copy and paste link into search bar. Use your mouse to scroll through the pages.    HF Hayes adalberto:   Heart Failure Free smart phone adalberto available for iPhone and Android download. Use your phone to track sodium intake, fluid intake, symptoms, and weight.     Low Sodium Diet / Recipes:  Go to www.[employer] website for “renal” diet which is Low Sodium! [employer] is a dialysis company, but this website offers free seasonal cookbooks. Each quarter, they will release 25-30 new recipes with a breakdown of calories, sodium, and glucose. You can also go to www.[employer]/recipes website for free recipes.     Home Exercise Program:   Identification of Green/Yellow/Red zones:  You should be able to identify when you feel good (green zone), if you have 1-2 symptoms of HF (yellow zone), or if you are in need of medical attention (red zone).  In your CHF education folder you were provided a “stop light tool” to outline this information.     We want to you to rate your exertion levels:    Our therapy team has discussed means of identification with you such as the \"Promise scale.\"  The Promise rating scale ranges from 6 to 20, where 6 means \"no exertion at all\" and 20 means \"maximal exertion.\" The goal is to use this to gauge how much effort it is taking for you to do your normal daily tasks.   You should be able to recognize when too much exertion is being expended.    Elements of Energy Conservation:   Prioritize/Plan: Decide what needs to be done today, and what can wait for a later date, write to do lists, plan ahead to avoid extra trips, and gather supplies and equipment needed before starting an activity.   Position:

## 2025-03-10 NOTE — PLAN OF CARE
Problem: Chronic Conditions and Co-morbidities  Goal: Patient's chronic conditions and co-morbidity symptoms are monitored and maintained or improved  Outcome: Progressing  Flowsheets (Taken 3/9/2025 1343 by Dedra Larose RN)  Care Plan - Patient's Chronic Conditions and Co-Morbidity Symptoms are Monitored and Maintained or Improved:   Monitor and assess patient's chronic conditions and comorbid symptoms for stability, deterioration, or improvement   Collaborate with multidisciplinary team to address chronic and comorbid conditions and prevent exacerbation or deterioration   Update acute care plan with appropriate goals if chronic or comorbid symptoms are exacerbated and prevent overall improvement and discharge     Problem: Pain  Goal: Verbalizes/displays adequate comfort level or baseline comfort level  Outcome: Progressing  Flowsheets (Taken 3/9/2025 2207)  Verbalizes/displays adequate comfort level or baseline comfort level:   Encourage patient to monitor pain and request assistance   Assess pain using appropriate pain scale   Administer analgesics based on type and severity of pain and evaluate response   Consider cultural and social influences on pain and pain management   Implement non-pharmacological measures as appropriate and evaluate response   Notify Licensed Independent Practitioner if interventions unsuccessful or patient reports new pain     Problem: Safety - Adult  Goal: Free from fall injury  Outcome: Progressing  Flowsheets (Taken 3/9/2025 2207)  Free From Fall Injury:   Based on caregiver fall risk screen, instruct family/caregiver to ask for assistance with transferring infant if caregiver noted to have fall risk factors   Instruct family/caregiver on patient safety     Problem: ABCDS Injury Assessment  Goal: Absence of physical injury  Outcome: Progressing  Flowsheets (Taken 3/9/2025 2207)  Absence of Physical Injury: Implement safety measures based on patient assessment

## 2025-03-10 NOTE — PROGRESS NOTES
Assessment completed and documented. VSS. A/ox4. Denies pain. RA at 90% Ambulates stand by but states she uses cane at home for long distance. Hat in commode for strict I&O, daily weights. Bed locked and in lowest position. Bedside table and call light within reach.

## 2025-03-10 NOTE — PROGRESS NOTES
Pt assessment completed and charted. VSS, pt on RA. Patient is a/o. IV site patent/flushed, saline locked. Strict I&Os and ACHS.   Safety Measures in place:   Bed in lowest position and wheels locked.   Call light within reach.   Bedside table within reach.   Non-skid socks in place.   Pt denies any other needs at this time.    Pt calls out appropriately.  Patient in stable condition when RN left room.

## 2025-03-10 NOTE — PROGRESS NOTES
[facility] Medicine Progress Note  V 1.6      Date of Admission: 3/9/2025    Hospital Day: 2      Chief Admission Complaint:  Cough (All Sx started Thursday and has been getting worse), Fever, Fatigue, Shortness of Breath, and Chills       Subjective:  feels better overall, on room air-no new complaints    Presenting Admission History:       \"79 y.o. female with past medical history significant for atrial fibrillation, diabetes, hypertension, hyperlipidemia, hypothyroidism, obesity, heart failure, former smoker.  Presented to [facility] with chest tightness and productive cough.  This was associated with dyspnea, headache, and dizziness.  Denies fever, n/v, palpitations, edema, weight gain.  There are no aggravating or alleviating factors.  No radiation of symptoms.\"    Assessment/Plan:      Current Principal Problem:  Acute exacerbation of chronic heart failure (HCC)    Acute on chronic HFpEF.  Echo 11/2024: EF 55-60%; normal wall motion; normal diastolic function.  Daily weights.  Strict I&O.  Received IV lasix in ED.  Continue home dose torsemide, entresto on admission.  BNP was 1,116 on arrival.  Follows with [facility] as outpatient.  CHF orderset is in place     Flu A.  Positive PCR on 3/9.  Isolation precautions and duration per hospital policy.  Continue tamiflu    Hypokalemia-replete     Diabetes type II.  No recent A1C found on chart review, one was ordered.  Hold home regimen while admitted, anticipate resuming on discharge.  Fingerstick blood glucose will be monitored.  Continue low-dose sliding scale insulin.  Hypoglycemia protocol in place.       Essential hypertension.  Continue entresto, amlodipine.  Monitor     Hyperlipidemia, controlled.  LDL noted to be 97 in April 2024.  Continue statin     Hypothyroidism.  No recent TSH found on chart review, one was ordered.  Continued levothyroxine     Atrial fibrillation, persistent.  Rate controlled.  Continue apixaban.  Not on

## 2025-03-10 NOTE — CARE COORDINATION
Case Management Assessment  Initial Evaluation    Date/Time of Evaluation: 3/10/2025 4:12 PM  Assessment Completed by: Chelsey Meadows RN    If patient is discharged prior to next notation, then this note serves as note for discharge by case management.    Patient Name: Tameka Cadena                   YOB: 1945  Diagnosis: Shortness of breath [R06.02]  Hypoxemia [R09.02]  Sore throat [J02.9]  Influenza A [J10.1]  Hypertensive urgency [I16.0]  Acute nonintractable headache, unspecified headache type [R51.9]  Acute on chronic congestive heart failure, unspecified heart failure type (HCC) [I50.9]  Acute exacerbation of chronic heart failure (HCC) [I50.9]                   Date / Time: 3/9/2025  6:13 AM    Patient Admission Status: Inpatient   Readmission Risk (Low < 19, Mod (19-27), High > 27): Readmission Risk Score: 10.6    Current PCP: Teresa Albarran APRN - NP  PCP verified by CM? Yes    Chart Reviewed: Yes      History Provided by: Patient  Patient Orientation: Alert and Oriented, Person, Place, Situation, Self    Patient Cognition: Alert    Hospitalization in the last 30 days (Readmission):  No    If yes, Readmission Assessment in CM Navigator will be completed.    Advance Directives:      Code Status: Full Code   Patient's Primary Decision Maker is: Legal Next of Kin    Primary Decision Maker: Tyrone Cadena - Child - [phone]    Secondary Decision Maker: Elicia Contreras - Child - [phone]    Discharge Planning:    Patient lives with: Alone Type of Home: House (town home condo)  Primary Care Giver: Self  Patient Support Systems include: Children   Current Financial resources: Medicare  Current community resources: None  Current services prior to admission: None            Current DME:              Type of Home Care services:  None    ADLS  Prior functional level: Independent in ADLs/IADLs  Current functional level: Independent in ADLs/IADLs    PT AM-PAC:   /24  OT AM-PAC:   /24    Family

## 2025-03-11 VITALS
DIASTOLIC BLOOD PRESSURE: 75 MMHG | RESPIRATION RATE: 19 BRPM | OXYGEN SATURATION: 95 % | TEMPERATURE: 97 F | WEIGHT: 225.5 LBS | SYSTOLIC BLOOD PRESSURE: 105 MMHG | HEART RATE: 69 BPM | HEIGHT: 66 IN | BODY MASS INDEX: 36.24 KG/M2

## 2025-03-11 LAB
ANION GAP SERPL CALCULATED.3IONS-SCNC: 10 MMOL/L (ref 3–16)
BUN SERPL-MCNC: 15 MG/DL (ref 7–20)
CALCIUM SERPL-MCNC: 8.7 MG/DL (ref 8.3–10.6)
CHLORIDE SERPL-SCNC: 101 MMOL/L (ref 99–110)
CHOLEST SERPL-MCNC: 118 MG/DL (ref 0–199)
CO2 SERPL-SCNC: 29 MMOL/L (ref 21–32)
CREAT SERPL-MCNC: 0.8 MG/DL (ref 0.6–1.2)
GFR SERPLBLD CREATININE-BSD FMLA CKD-EPI: 75 ML/MIN/{1.73_M2}
GLUCOSE BLD-MCNC: 150 MG/DL (ref 70–99)
GLUCOSE BLD-MCNC: 151 MG/DL (ref 70–99)
GLUCOSE BLD-MCNC: 184 MG/DL (ref 70–99)
GLUCOSE SERPL-MCNC: 125 MG/DL (ref 70–99)
HDLC SERPL-MCNC: 58 MG/DL (ref 40–60)
LDLC SERPL CALC-MCNC: 45 MG/DL
MAGNESIUM SERPL-MCNC: 2.13 MG/DL (ref 1.8–2.4)
NT-PROBNP SERPL-MCNC: 198 PG/ML (ref 0–449)
PERFORMED ON: ABNORMAL
POTASSIUM SERPL-SCNC: 3.9 MMOL/L (ref 3.5–5.1)
SODIUM SERPL-SCNC: 140 MMOL/L (ref 136–145)
TRIGL SERPL-MCNC: 73 MG/DL (ref 0–150)
TSH SERPL DL<=0.005 MIU/L-ACNC: 0.72 UIU/ML (ref 0.27–4.2)
VLDLC SERPL CALC-MCNC: 15 MG/DL

## 2025-03-11 PROCEDURE — 84443 ASSAY THYROID STIM HORMONE: CPT

## 2025-03-11 PROCEDURE — 2500000003 HC RX 250 WO HCPCS

## 2025-03-11 PROCEDURE — 83880 ASSAY OF NATRIURETIC PEPTIDE: CPT

## 2025-03-11 PROCEDURE — 36415 COLL VENOUS BLD VENIPUNCTURE: CPT

## 2025-03-11 PROCEDURE — 80061 LIPID PANEL: CPT

## 2025-03-11 PROCEDURE — 83735 ASSAY OF MAGNESIUM: CPT

## 2025-03-11 PROCEDURE — 6370000000 HC RX 637 (ALT 250 FOR IP)

## 2025-03-11 PROCEDURE — G0378 HOSPITAL OBSERVATION PER HR: HCPCS

## 2025-03-11 PROCEDURE — 80048 BASIC METABOLIC PNL TOTAL CA: CPT

## 2025-03-11 RX ORDER — OSELTAMIVIR PHOSPHATE 75 MG/1
75 CAPSULE ORAL 2 TIMES DAILY
Qty: 5 CAPSULE | Refills: 0 | Status: SHIPPED | OUTPATIENT
Start: 2025-03-11 | End: 2025-03-14

## 2025-03-11 RX ADMIN — TORSEMIDE 20 MG: 20 TABLET ORAL at 10:40

## 2025-03-11 RX ADMIN — INSULIN LISPRO 1 UNITS: 100 INJECTION, SOLUTION INTRAVENOUS; SUBCUTANEOUS at 13:50

## 2025-03-11 RX ADMIN — SACUBITRIL AND VALSARTAN 1 TABLET: 24; 26 TABLET, FILM COATED ORAL at 10:40

## 2025-03-11 RX ADMIN — LEVOTHYROXINE SODIUM 137 MCG: 0.03 TABLET ORAL at 05:47

## 2025-03-11 RX ADMIN — SODIUM CHLORIDE, PRESERVATIVE FREE 10 ML: 5 INJECTION INTRAVENOUS at 10:41

## 2025-03-11 RX ADMIN — AMLODIPINE BESYLATE 5 MG: 5 TABLET ORAL at 10:40

## 2025-03-11 RX ADMIN — APIXABAN 5 MG: 5 TABLET, FILM COATED ORAL at 10:40

## 2025-03-11 RX ADMIN — OSELTAMIVIR 75 MG: 75 CAPSULE ORAL at 10:40

## 2025-03-11 RX ADMIN — ASPIRIN 81 MG: 81 TABLET, CHEWABLE ORAL at 10:41

## 2025-03-11 RX ADMIN — ATORVASTATIN CALCIUM 20 MG: 10 TABLET, FILM COATED ORAL at 10:40

## 2025-03-11 NOTE — CARE COORDINATION
CASE MANAGEMENT DISCHARGE SUMMARY      Discharge to: home  :     IMM given: 3/10/25    New Durable Medical Equipment ordered/agency: none    Transportation: son will  later     Confirmed discharge plan with:     Patient: yes     Family:  yes per patient.     Stated will get   Tamiflu RX at OP pharmacy   RN, name: Asmita Meadows, MEG

## 2025-03-11 NOTE — DISCHARGE SUMMARY
Hospital Medicine Discharge Summary    Patient: Tameka Cadena   : 1945     Hospital:  [facility]  Admit Date: 3/9/2025   Discharge Date: 3/11/2025    Disposition:  []Home   [x]HHC  []SNF  []Acute Rehab  []LTAC  []Hospice  Code status:  [x]Full  []DNR/CCA  []Limited (DNR/CCA with Do Not Intubate)  []DNRCC  Condition at Discharge: Stable  Primary Care Provider: Teresa Albarran APRN - NP    Admitting Provider: Román Tejada MD  Discharge Provider: BASSAM Malone CNP     Discharge Diagnoses:      Active Hospital Problems    Diagnosis     Acute exacerbation of chronic heart failure (HCC) [I50.9]        Presenting Admission History:      79 y.o. female with past medical history significant for atrial fibrillation, diabetes, hypertension, hyperlipidemia, hypothyroidism, obesity, heart failure, and former smoker who presented to [facility] with chest tightness and productive cough.  This was associated with dyspnea, headache, and dizziness. She  denied fever, n/v, palpitations, edema, or weight gain.  There are no aggravating or alleviating factors.  No radiation of symptoms. Influenza A positive. CXR stable, BNP 1116->198. Tamiflu started. Overall, symptom improvement.     Acute on chronic HFpEF.  Echo 2024: EF 55-60%; normal wall motion; normal diastolic function.  Daily weights.  Strict I&O.  Received IV lasix in ED.  Continue home dose torsemide, entresto on admission.  BNP was 1,116 on arrival.  Follows with [facility] as outpatient.  CHF orderset is in place     Flu A.  Positive PCR on 3/9.  Isolation precautions and duration per hospital policy.  Continue tamiflu     Hypokalemia-replete     Diabetes type II.  A1c 7.7  Hold home regimen while admitted, anticipate resuming on discharge.  Fingerstick blood glucose will be monitored.  Continue low-dose sliding scale insulin.  Hypoglycemia protocol in place.       Essential hypertension.  Continue

## 2025-03-11 NOTE — PLAN OF CARE
Problem: Safety - Adult  Goal: Free from fall injury  3/11/2025 0143 by Ruth Valdez, RN  Outcome: Progressing  3/10/2025 1613 by Reza Hernandez, RN  Outcome: Progressing  Flowsheets (Taken 3/9/2025 2207 by Delaney Sullivan, RN)  Free From Fall Injury:   Based on caregiver fall risk screen, instruct family/caregiver to ask for assistance with transferring infant if caregiver noted to have fall risk factors   Instruct family/caregiver on patient safety     Problem: Respiratory - Adult  Goal: Achieves optimal ventilation and oxygenation  Outcome: Progressing

## 2025-03-11 NOTE — PROGRESS NOTES
Pt taken out by wheel chair. Pt verbalized understands discharge instructions with no questions or concerns.Desiree Nunez RN

## 2025-03-11 NOTE — PLAN OF CARE
Problem: Chronic Conditions and Co-morbidities  Goal: Patient's chronic conditions and co-morbidity symptoms are monitored and maintained or improved  Outcome: Adequate for Discharge     Problem: Pain  Goal: Verbalizes/displays adequate comfort level or baseline comfort level  Outcome: Adequate for Discharge     Problem: Safety - Adult  Goal: Free from fall injury  3/11/2025 1330 by Asmita Urena RN  Outcome: Adequate for Discharge  3/11/2025 0143 by Ruth Valdez RN  Outcome: Progressing     Problem: ABCDS Injury Assessment  Goal: Absence of physical injury  Outcome: Adequate for Discharge     Problem: Respiratory - Adult  Goal: Achieves optimal ventilation and oxygenation  3/11/2025 1330 by Asmita Urena RN  Outcome: Adequate for Discharge  3/11/2025 0143 by Ruth Valdez RN  Outcome: Progressing      [Time Spent: ___ minutes] : I have spent [unfilled] minutes of time on the encounter.

## 2025-03-14 ENCOUNTER — FOLLOWUP TELEPHONE ENCOUNTER (OUTPATIENT)
Dept: TELEMETRY | Age: 80
End: 2025-03-14

## 2025-03-14 LAB
CHOLEST SERPL-MCNC: 121 MG/DL (ref 0–199)
HDLC SERPL-MCNC: 65 MG/DL (ref 40–60)
LDLC SERPL CALC-MCNC: 45 MG/DL
TRIGL SERPL-MCNC: 57 MG/DL (ref 0–150)
TSH SERPL DL<=0.005 MIU/L-ACNC: 0.79 UIU/ML (ref 0.27–4.2)
VLDLC SERPL CALC-MCNC: 11 MG/DL

## 2025-03-14 NOTE — TELEPHONE ENCOUNTER
Care Transitions Initial Follow Up Call    Call within 2 business days of discharge: Yes     Patient: Tameka Cadena Patient : 1945 MRN: [id number]    [unfilled]    RARS: Readmission Risk Score: 10.4       Spoke with: patient    Discharge department/facility: home    Non-face-to-face services provided:  Scheduled appointment with PCP-3/18/25    Spoke to patient for hospital follow up.  Denies any needs.  States she has a follow up appt scheduled for 3/18/25.      Follow Up  Future Appointments   Date Time Provider Department Center   2025  8:30 AM Kachoris, Marni, APRN - CNP Roberto Car [facility]   2025  8:30 AM Momo Watson MD Anderson Car AMAN Maynard RN

## 2025-03-26 NOTE — PROGRESS NOTES
Physician Progress Note      PATIENT:               FORD CUMMINS  [facility] #:                  [id number]  :                       1945  ADMIT DATE:       3/9/2025 6:13 AM  DISCH DATE:        3/11/2025 6:39 PM  RESPONDING  PROVIDER #:        IVIS AVERY          QUERY TEXT:    Patient admitted with chest tightness and productive cough. Noted   documentation of Acute on chronic HFpEF in Internal medicine H&P on  . In   order to support the diagnosis of Acute on chronic HFpEF, please include   additional clinical indicators in your documentation.  Or please document if   the diagnosis of Acute on chronic HFpEF has been ruled out after further   study.    The medical record reflects the following:    Risk Factors: HTN, 79yr old female ,CAD , DM    Clinical Indicators:  ED -Decreased breath sounds and rhonchi present.    H&P - Acute on chronic HFpEF.  Echo 2024: EF 55-60%; normal wall   motion; normal diastolic function.  Daily weights.  Strict I&O.  Received IV   lasix in ED.  Continue home dose torsemide, entresto on admission.  BNP was   1,116 on arrival.This was associated with dyspnea, headache, and dizziness    DS -Acute on chronic HFpEF ,There are no aggravating or alleviating   factors.  No radiation of symptoms. Influenza A positive. CXR stable, BNP   1116->198. Tamiflu started. Overall, symptom improvement    BNP is 1116, 198 on  &    Treatment: furosemide IV, Serial labs, Daily weights    Thank you,  Olya Ramirez [facility] CDS.  Options provided:  -- Acute on chronic HFpEF present as evidenced by, Please document evidence.  -- Acute Diastolic CHF was ruled out  & Chronic diastolic CHF is confirmed  -- Other - I will add my own diagnosis  -- Disagree - Not applicable / Not valid  -- Disagree - Clinically unable to determine / Unknown  -- Refer to Clinical Documentation Reviewer    PROVIDER RESPONSE TEXT:    This patient has Acute on chronic HFpEF is present as evidenced by

## 2025-04-23 ENCOUNTER — HOSPITAL ENCOUNTER (OUTPATIENT)
Dept: PHYSICAL THERAPY | Age: 80
Setting detail: THERAPIES SERIES
Discharge: HOME | End: 2025-04-23
Payer: MEDICARE

## 2025-04-23 DIAGNOSIS — H81.13 BENIGN PAROXYSMAL VERTIGO OF BOTH EARS: Primary | ICD-10-CM

## 2025-04-23 PROCEDURE — 97112 NEUROMUSCULAR REEDUCATION: CPT

## 2025-04-23 PROCEDURE — 97161 PT EVAL LOW COMPLEX 20 MIN: CPT

## 2025-04-23 PROCEDURE — 97140 MANUAL THERAPY 1/> REGIONS: CPT

## 2025-04-23 NOTE — PLAN OF CARE
[facility]: [address] [place] [address] [place] [address] office: [phone] fax: [phone]     Physical Therapy Initial Evaluation Certification    Dear NICOLE Watson,    We had the pleasure of evaluating the following patient for physical therapy services at [facility].  A summary of our findings can be found in the initial assessment below.  This includes our plan of care.  If you have any questions or concerns regarding these findings, please do not hesitate to contact me at the office phone number listed above.  Thank you for the referral.     Physician Signature:_______________________________Date:__________________  By signing above (or electronic signature), therapist’s plan is approved by physician       Physical Therapy: TREATMENT/PROGRESS NOTE   Patient: Tameka Cadena (79 y.o. female)   Examination Date: 2025   :  1945 MRN: [id number]   Visit #: 1   Insurance Allowable Auth Needed   AUTH [x]Yes    []No    Insurance: Payor: [facility] MEDICARE / Plan: [facility] MEDICARE / Product Type: *No Product type* /   Insurance ID: [id number] - (Medicare Managed)  Secondary Insurance (if applicable):    Treatment Diagnosis:     ICD-10-CM    1. Benign paroxysmal vertigo of both ears  H81.13          Medical Diagnosis:  Benign paroxysmal vertigo, bilateral [H81.13]   Referring Physician: Teresa Albarran, *  PCP: Teresa Albarran APRN - NP       Plan of care signed (Y/N):     Date of Patient follow up with Physician:      Plan of Care Report: EVAL today  POC update due: (10 visits /OR AUTH LIMITS, whichever is less)   PN due 2025 or 10 visits  POC due in 10 visits or 90 days Recert                                              Medical History:  Comorbidities:  Other: see below  Relevant Medical History:   Past Medical History:   Diagnosis Date    CHF (congestive heart failure), NYHA class I, acute

## 2025-04-30 ENCOUNTER — HOSPITAL ENCOUNTER (OUTPATIENT)
Dept: PHYSICAL THERAPY | Age: 80
Setting detail: THERAPIES SERIES
End: 2025-04-30
Payer: MEDICARE

## 2025-05-21 ASSESSMENT — ENCOUNTER SYMPTOMS
SHORTNESS OF BREATH: 0
COUGH: 0
RHINORRHEA: 0
EYE PAIN: 0
NAUSEA: 0
VOMITING: 0

## 2025-05-21 NOTE — PROGRESS NOTES
CNP   Lancets MISC 1 po daily, please provide insurance formulary brand - ACCU-Griselda ELISE Type 2 DM, testing daily 3/2/16  Yes Cong Shepherd APRN - CNP   Vitamin D (CHOLECALCIFEROL) 1000 UNITS CAPS capsule Take 1 capsule by mouth daily   Yes Provider, MD Aristeo   calcium-vitamin D (OSCAL-500) 500-200 MG-UNIT per tablet Take 2 tablets by mouth daily   Yes Provider, MD Aristeo   aspirin 81 MG tablet Take 1 tablet by mouth daily   Yes Provider, MD Aristeo     REVIEW OF SYSTEMS  The following systems were reviewed and revealed the following in addition to any already discussed in the HPI:  Review of Systems   Constitutional:  Negative for fatigue and fever.   HENT:  Positive for hearing loss and tinnitus. Negative for congestion, ear pain, postnasal drip, rhinorrhea and sneezing.    Eyes:  Negative for pain and visual disturbance.   Respiratory:  Negative for cough and shortness of breath.    Cardiovascular:  Negative for chest pain.   Gastrointestinal:  Negative for nausea and vomiting.   Endocrine: Negative.    Genitourinary: Negative.    Musculoskeletal:  Negative for neck pain and neck stiffness.   Skin:  Negative for rash.   Neurological:  Negative for dizziness and headaches.      PHYSICAL EXAM  /67   Pulse 72   Ht 1.676 m (5' 6\")   Wt 102.1 kg (225 lb)   BMI 36.32 kg/m²   GENERAL: No Acute Distress, Alert and Oriented, no hoarseness  EYES: EOMI, Anti-icteric  NOSE: No epistaxis, nasal mucosa within normal limits, no purulent drainage  EARS: Normal external canal appearance, EAC patent bilaterally, TMs intact bilaterally with no evidence of effusions  FACE: 1/6 House-Brackmann Scale, symmetric, sensation equal bilaterally  ORAL CAVITY: No masses or lesions palpated, uvula is midline, moist mucous membranes,   NECK: Normal range of motion, no thyromegaly, trachea is midline, no lymphadenopathy, no neck masses, no crepitus  CHEST: Normal respiratory effort, no retractions, breathing

## 2025-05-22 ENCOUNTER — PROCEDURE VISIT (OUTPATIENT)
Dept: AUDIOLOGY | Age: 80
End: 2025-05-22
Payer: MEDICARE

## 2025-05-22 ENCOUNTER — OFFICE VISIT (OUTPATIENT)
Dept: ENT CLINIC | Age: 80
End: 2025-05-22
Payer: MEDICARE

## 2025-05-22 VITALS
HEIGHT: 66 IN | WEIGHT: 225 LBS | BODY MASS INDEX: 36.16 KG/M2 | DIASTOLIC BLOOD PRESSURE: 67 MMHG | HEART RATE: 72 BPM | SYSTOLIC BLOOD PRESSURE: 114 MMHG

## 2025-05-22 DIAGNOSIS — H93.13 TINNITUS OF BOTH EARS: ICD-10-CM

## 2025-05-22 DIAGNOSIS — H90.3 SENSORINEURAL HEARING LOSS (SNHL) OF BOTH EARS: Primary | ICD-10-CM

## 2025-05-22 DIAGNOSIS — H93.13 TINNITUS, BILATERAL: ICD-10-CM

## 2025-05-22 DIAGNOSIS — H90.3 SENSORINEURAL HEARING LOSS, BILATERAL: Primary | ICD-10-CM

## 2025-05-22 PROCEDURE — 1159F MED LIST DOCD IN RCRD: CPT | Performed by: STUDENT IN AN ORGANIZED HEALTH CARE EDUCATION/TRAINING PROGRAM

## 2025-05-22 PROCEDURE — 92567 TYMPANOMETRY: CPT | Performed by: AUDIOLOGIST

## 2025-05-22 PROCEDURE — 3074F SYST BP LT 130 MM HG: CPT | Performed by: STUDENT IN AN ORGANIZED HEALTH CARE EDUCATION/TRAINING PROGRAM

## 2025-05-22 PROCEDURE — 92557 COMPREHENSIVE HEARING TEST: CPT | Performed by: AUDIOLOGIST

## 2025-05-22 PROCEDURE — 99214 OFFICE O/P EST MOD 30 MIN: CPT | Performed by: STUDENT IN AN ORGANIZED HEALTH CARE EDUCATION/TRAINING PROGRAM

## 2025-05-22 PROCEDURE — 1160F RVW MEDS BY RX/DR IN RCRD: CPT | Performed by: STUDENT IN AN ORGANIZED HEALTH CARE EDUCATION/TRAINING PROGRAM

## 2025-05-22 PROCEDURE — 3078F DIAST BP <80 MM HG: CPT | Performed by: STUDENT IN AN ORGANIZED HEALTH CARE EDUCATION/TRAINING PROGRAM

## 2025-05-22 PROCEDURE — 1123F ACP DISCUSS/DSCN MKR DOCD: CPT | Performed by: STUDENT IN AN ORGANIZED HEALTH CARE EDUCATION/TRAINING PROGRAM

## 2025-05-22 NOTE — PROGRESS NOTES
Tameka Cadena   1945, 79 y.o. female   [id number]       Referring Provider: Pedro Chester DO  Referral Type: In an order in Epic    Reason for Visit: Evaluation of suspected change in hearing and tinnitus    ADULT AUDIOLOGIC EVALUATION      Tameka Cadena is a 79 y.o. female seen today, 5/22/2025 , for a recheck audiologic evaluation.  Patient was seen by Pedro Chester DO following today's evaluation. Previous evaluation from 4/25/23 viewable in medical record.     AUDIOLOGIC AND OTHER PERTINENT MEDICAL HISTORY:      Tameka Cadena noted continued tinnitus and decreased hearing-denies recent dizziness. Medical history is reportedly significant for knee replacement surgery and atrial fibrillation. No additional significant otologic or medical history was reported.      Tameka Cadena denied otalgia, aural fullness, history of occupational/recreational noise exposure, history of head trauma, history of ear surgery, and family history of hearing loss.    Date: 5/22/2025     IMPRESSIONS:      Today's results revealed an asymmetric sensorineural hearing loss with excellent word recognition, bilaterally. Asymmetries 10 dBH or greater noted from 1-4kHz with right ear worse than left. Hearing loss significant enough to create hearing difficulty in at least some listening situations. Discussed benefits of amplification. Follow medical recommendations of Pedro Chester DO.    ASSESSMENT AND FINDINGS:     Otoscopy revealed: Clear ear canals bilaterally    RIGHT EAR:  Hearing Sensitivity: Within normal limits through 500Hz sloping to a mild to moderate to severe sensorineural hearing loss.   Speech Recognition Threshold: 25 dB HL  Word Recognition: Excellent 100%, based on NU-6 25-word list at 75 dBHL masked using recorded speech stimuli.    Tympanometry: Normal peak pressure and compliance, Type A tympanogram, consistent with normal middle ear function.  Acoustic Reflexes: Ipsilateral: Could not maintain seal. Contralateral:

## 2025-06-18 NOTE — PROGRESS NOTES
[facility]   Electrophysiology Outpatient Note              Date:  June 19, 2025  Patient name: Tameka Cadena  YOB: 1945    Primary Care physician: Teresa Albarran APRN - NP    HISTORY OF PRESENT ILLNESS: The patient is a 79 y.o.  female with a history of paroxysmal atrial fibrillation, RBBB, CAD, hyperlipidemia, hypertension, hypothyroidism, diabetes and gastric bypass surgery     In 2/2022 she presented to cardiology for preop clearance for knee surgery. EKG revealed atrial fibrillation. She was started on Eliquis. In 3/2022 she had LHC that revealed mild, nonobstructive CAD. Patient wore a cardiac event monitor from 2/23/2022 to 2/25/2022 which demonstrated persistent atrial fibrillation with an average HR of 73 (). In 7/2022 she had a right knee replacement. On 7/28/2022 she underwent DCCV with successful restoration of SR. In 11/2022 at , she was found to be in AF. 1 week cardiac monitor showed predominately AF with average HR of 80 bpm (), 0.77% PVC burden, and NSVT.    Patient was admitted to the hospital 10/3/2023 with complaints of shortness of breath and cough.  She was found to be in heart failure.  Patient was started on IV Lasix and Entresto, Jardiance and transition to Demadex at discharge    3/28/2024 she was seen for history of paroxysmal atrial fibrillation. ECG shows A-fib with a HR of 67. Patient states she has been feeling well.  She denies chest pain shortness of breath and palpitations.  She remains active on a daily basis.  She plans on starting her golf league with her girlfriends soon.  Had long discussion regarding her Tikosyn initiation that was planned with Dr Gross. She has decided to postpone this for now. She wants to wait since she is feeling good.    9/26/2024 patient was seen for history of persistent atrial fibrillation.  ECG reveals atrial fibrillation rate 72.  Patient denies chest pain, shortness breath and

## 2025-06-19 ENCOUNTER — OFFICE VISIT (OUTPATIENT)
Dept: CARDIOLOGY CLINIC | Age: 80
End: 2025-06-19

## 2025-06-19 VITALS
BODY MASS INDEX: 36.96 KG/M2 | HEIGHT: 66 IN | WEIGHT: 230 LBS | DIASTOLIC BLOOD PRESSURE: 62 MMHG | SYSTOLIC BLOOD PRESSURE: 128 MMHG | OXYGEN SATURATION: 98 % | HEART RATE: 66 BPM

## 2025-06-19 DIAGNOSIS — I50.32 CHRONIC DIASTOLIC HEART FAILURE WITH PRESERVED EJECTION FRACTION (HCC): ICD-10-CM

## 2025-06-19 DIAGNOSIS — I48.19 PERSISTENT ATRIAL FIBRILLATION (HCC): Primary | ICD-10-CM

## 2025-06-19 DIAGNOSIS — E78.2 MIXED HYPERLIPIDEMIA: ICD-10-CM

## 2025-06-19 DIAGNOSIS — I10 ESSENTIAL HYPERTENSION: ICD-10-CM

## 2025-06-19 DIAGNOSIS — E11.9 TYPE 2 DIABETES MELLITUS WITHOUT COMPLICATION, WITHOUT LONG-TERM CURRENT USE OF INSULIN (HCC): ICD-10-CM

## 2025-06-19 DIAGNOSIS — G47.33 OSA (OBSTRUCTIVE SLEEP APNEA): ICD-10-CM

## 2025-06-19 PROCEDURE — 1160F RVW MEDS BY RX/DR IN RCRD: CPT

## 2025-06-19 PROCEDURE — 3078F DIAST BP <80 MM HG: CPT

## 2025-06-19 PROCEDURE — 3051F HG A1C>EQUAL 7.0%<8.0%: CPT

## 2025-06-19 PROCEDURE — 1123F ACP DISCUSS/DSCN MKR DOCD: CPT

## 2025-06-19 PROCEDURE — 1159F MED LIST DOCD IN RCRD: CPT

## 2025-06-19 PROCEDURE — 99214 OFFICE O/P EST MOD 30 MIN: CPT

## 2025-06-19 PROCEDURE — 3074F SYST BP LT 130 MM HG: CPT

## 2025-06-19 PROCEDURE — G2211 COMPLEX E/M VISIT ADD ON: HCPCS

## 2025-06-19 PROCEDURE — 93000 ELECTROCARDIOGRAM COMPLETE: CPT

## 2025-06-19 NOTE — PATIENT INSTRUCTIONS
Continue Entresto 24-25 mg twice daily  Continue Jardiance 25 mg daily  Continue Demadex 20 mg oral twice daily & KCl 20 mill equivalents twice daily  Continue Norvasc 5 mg oral daily  Continue aspirin 81 mg daily & Lipitor 20 mg nightly  Continue Eliquis 5 mg twice daily for stroke risk reduction  Remain physically active   Call with any symptoms of palpitations, excessive fatigue, lightheadedness or shortness of breath  Recommend Head CT for continue dizziness    Follow up with Dr Watson 12/5/2025    Follow up in 4/2026 Manri JONES
